# Patient Record
Sex: FEMALE | Race: WHITE | NOT HISPANIC OR LATINO | Employment: OTHER | URBAN - METROPOLITAN AREA
[De-identification: names, ages, dates, MRNs, and addresses within clinical notes are randomized per-mention and may not be internally consistent; named-entity substitution may affect disease eponyms.]

---

## 2025-04-04 ENCOUNTER — APPOINTMENT (OUTPATIENT)
Dept: RADIOLOGY | Facility: HOSPITAL | Age: OVER 89
End: 2025-04-04
Payer: MEDICARE

## 2025-04-04 ENCOUNTER — HOSPITAL ENCOUNTER (OUTPATIENT)
Facility: HOSPITAL | Age: OVER 89
Setting detail: OBSERVATION
End: 2025-04-04
Attending: STUDENT IN AN ORGANIZED HEALTH CARE EDUCATION/TRAINING PROGRAM | Admitting: INTERNAL MEDICINE
Payer: MEDICARE

## 2025-04-04 ENCOUNTER — APPOINTMENT (OUTPATIENT)
Dept: CARDIOLOGY | Facility: HOSPITAL | Age: OVER 89
End: 2025-04-04
Payer: MEDICARE

## 2025-04-04 DIAGNOSIS — K59.00 CONSTIPATION, UNSPECIFIED CONSTIPATION TYPE: ICD-10-CM

## 2025-04-04 DIAGNOSIS — I10 PRIMARY HYPERTENSION: ICD-10-CM

## 2025-04-04 DIAGNOSIS — G47.00 INSOMNIA, UNSPECIFIED TYPE: ICD-10-CM
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
## 2025-04-04 DIAGNOSIS — N39.0 URINARY TRACT INFECTION WITHOUT HEMATURIA, SITE UNSPECIFIED: ICD-10-CM

## 2025-04-04 LAB
ALBUMIN SERPL BCP-MCNC: 3.6 G/DL (ref 3.4–5)
ALP SERPL-CCNC: 66 U/L (ref 33–136)
ALT SERPL W P-5'-P-CCNC: 7 U/L (ref 7–45)
ANION GAP SERPL CALC-SCNC: 12 MMOL/L (ref 10–20)
AST SERPL W P-5'-P-CCNC: 17 U/L (ref 9–39)
BASOPHILS # BLD AUTO: 0.05 X10*3/UL (ref 0–0.1)
BASOPHILS NFR BLD AUTO: 0.8 %
BILIRUB SERPL-MCNC: 0.5 MG/DL (ref 0–1.2)
BNP SERPL-MCNC: 42 PG/ML (ref 0–99)
BUN SERPL-MCNC: 23 MG/DL (ref 6–23)
CALCIUM SERPL-MCNC: 9.5 MG/DL (ref 8.6–10.3)
CARDIAC TROPONIN I PNL SERPL HS: 6 NG/L (ref 0–13)
CARDIAC TROPONIN I PNL SERPL HS: 6 NG/L (ref 0–13)
CHLORIDE SERPL-SCNC: 109 MMOL/L (ref 98–107)
CO2 SERPL-SCNC: 20 MMOL/L (ref 21–32)
CREAT SERPL-MCNC: 1.13 MG/DL (ref 0.5–1.05)
EGFRCR SERPLBLD CKD-EPI 2021: 45 ML/MIN/1.73M*2
EOSINOPHIL # BLD AUTO: 0.08 X10*3/UL (ref 0–0.4)
EOSINOPHIL NFR BLD AUTO: 1.2 %
ERYTHROCYTE [DISTWIDTH] IN BLOOD BY AUTOMATED COUNT: 18.6 % (ref 11.5–14.5)
GLUCOSE SERPL-MCNC: 94 MG/DL (ref 74–99)
HCT VFR BLD AUTO: 32.3 % (ref 36–46)
HGB BLD-MCNC: 10.4 G/DL (ref 12–16)
IMM GRANULOCYTES # BLD AUTO: 0.02 X10*3/UL (ref 0–0.5)
IMM GRANULOCYTES NFR BLD AUTO: 0.3 % (ref 0–0.9)
LACTATE SERPL-SCNC: 0.6 MMOL/L (ref 0.4–2)
LYMPHOCYTES # BLD AUTO: 1.52 X10*3/UL (ref 0.8–3)
LYMPHOCYTES NFR BLD AUTO: 23.4 %
MCH RBC QN AUTO: 30.6 PG (ref 26–34)
MCHC RBC AUTO-ENTMCNC: 32.2 G/DL (ref 32–36)
MCV RBC AUTO: 95 FL (ref 80–100)
MONOCYTES # BLD AUTO: 0.73 X10*3/UL (ref 0.05–0.8)
MONOCYTES NFR BLD AUTO: 11.2 %
NEUTROPHILS # BLD AUTO: 4.1 X10*3/UL (ref 1.6–5.5)
NEUTROPHILS NFR BLD AUTO: 63.1 %
NRBC BLD-RTO: 0 /100 WBCS (ref 0–0)
PLATELET # BLD AUTO: 270 X10*3/UL (ref 150–450)
POTASSIUM SERPL-SCNC: 4.7 MMOL/L (ref 3.5–5.3)
PROT SERPL-MCNC: 6.3 G/DL (ref 6.4–8.2)
RBC # BLD AUTO: 3.4 X10*6/UL (ref 4–5.2)
SODIUM SERPL-SCNC: 136 MMOL/L (ref 136–145)
WBC # BLD AUTO: 6.5 X10*3/UL (ref 4.4–11.3)

## 2025-04-04 PROCEDURE — 36415 COLL VENOUS BLD VENIPUNCTURE: CPT | Performed by: EMERGENCY MEDICINE

## 2025-04-04 PROCEDURE — 72125 CT NECK SPINE W/O DYE: CPT | Performed by: RADIOLOGY

## 2025-04-04 PROCEDURE — 36415 COLL VENOUS BLD VENIPUNCTURE: CPT | Performed by: STUDENT IN AN ORGANIZED HEALTH CARE EDUCATION/TRAINING PROGRAM

## 2025-04-04 PROCEDURE — 83880 ASSAY OF NATRIURETIC PEPTIDE: CPT | Performed by: EMERGENCY MEDICINE

## 2025-04-04 PROCEDURE — 85025 COMPLETE CBC W/AUTO DIFF WBC: CPT | Performed by: STUDENT IN AN ORGANIZED HEALTH CARE EDUCATION/TRAINING PROGRAM

## 2025-04-04 PROCEDURE — 2500000001 HC RX 250 WO HCPCS SELF ADMINISTERED DRUGS (ALT 637 FOR MEDICARE OP): Performed by: EMERGENCY MEDICINE

## 2025-04-04 PROCEDURE — 73590 X-RAY EXAM OF LOWER LEG: CPT | Mod: LEFT SIDE | Performed by: RADIOLOGY

## 2025-04-04 PROCEDURE — 73030 X-RAY EXAM OF SHOULDER: CPT | Mod: LEFT SIDE | Performed by: RADIOLOGY

## 2025-04-04 PROCEDURE — 71046 X-RAY EXAM CHEST 2 VIEWS: CPT | Mod: FOREIGN READ | Performed by: RADIOLOGY

## 2025-04-04 PROCEDURE — 96361 HYDRATE IV INFUSION ADD-ON: CPT | Mod: 59

## 2025-04-04 PROCEDURE — 93005 ELECTROCARDIOGRAM TRACING: CPT

## 2025-04-04 PROCEDURE — 84484 ASSAY OF TROPONIN QUANT: CPT | Performed by: STUDENT IN AN ORGANIZED HEALTH CARE EDUCATION/TRAINING PROGRAM

## 2025-04-04 PROCEDURE — 80053 COMPREHEN METABOLIC PANEL: CPT | Performed by: STUDENT IN AN ORGANIZED HEALTH CARE EDUCATION/TRAINING PROGRAM

## 2025-04-04 PROCEDURE — 2500000004 HC RX 250 GENERAL PHARMACY W/ HCPCS (ALT 636 FOR OP/ED): Performed by: EMERGENCY MEDICINE

## 2025-04-04 PROCEDURE — 73590 X-RAY EXAM OF LOWER LEG: CPT | Mod: LT

## 2025-04-04 PROCEDURE — 71046 X-RAY EXAM CHEST 2 VIEWS: CPT

## 2025-04-04 PROCEDURE — 72170 X-RAY EXAM OF PELVIS: CPT

## 2025-04-04 PROCEDURE — 99285 EMERGENCY DEPT VISIT HI MDM: CPT | Mod: 25 | Performed by: STUDENT IN AN ORGANIZED HEALTH CARE EDUCATION/TRAINING PROGRAM

## 2025-04-04 PROCEDURE — 84484 ASSAY OF TROPONIN QUANT: CPT | Performed by: EMERGENCY MEDICINE

## 2025-04-04 PROCEDURE — 70450 CT HEAD/BRAIN W/O DYE: CPT | Performed by: RADIOLOGY

## 2025-04-04 PROCEDURE — 72125 CT NECK SPINE W/O DYE: CPT

## 2025-04-04 PROCEDURE — 73030 X-RAY EXAM OF SHOULDER: CPT | Mod: LT

## 2025-04-04 PROCEDURE — 72170 X-RAY EXAM OF PELVIS: CPT | Mod: FOREIGN READ | Performed by: RADIOLOGY

## 2025-04-04 PROCEDURE — 70450 CT HEAD/BRAIN W/O DYE: CPT

## 2025-04-04 PROCEDURE — 83605 ASSAY OF LACTIC ACID: CPT | Performed by: STUDENT IN AN ORGANIZED HEALTH CARE EDUCATION/TRAINING PROGRAM

## 2025-04-04 RX ORDER — ACETAMINOPHEN 325 MG/1
650 TABLET ORAL ONCE
Status: COMPLETED | OUTPATIENT
Start: 2025-04-04 | End: 2025-04-04

## 2025-04-04 RX ADMIN — SODIUM CHLORIDE 500 ML: 0.9 INJECTION, SOLUTION INTRAVENOUS at 23:02

## 2025-04-04 RX ADMIN — ACETAMINOPHEN 650 MG: 325 TABLET ORAL at 23:03

## 2025-04-04 ASSESSMENT — LIFESTYLE VARIABLES
EVER HAD A DRINK FIRST THING IN THE MORNING TO STEADY YOUR NERVES TO GET RID OF A HANGOVER: NO
TOTAL SCORE: 0
EVER FELT BAD OR GUILTY ABOUT YOUR DRINKING: NO
HAVE PEOPLE ANNOYED YOU BY CRITICIZING YOUR DRINKING: NO
HAVE YOU EVER FELT YOU SHOULD CUT DOWN ON YOUR DRINKING: NO

## 2025-04-04 ASSESSMENT — PAIN SCALES - GENERAL: PAINLEVEL_OUTOF10: 0 - NO PAIN

## 2025-04-04 ASSESSMENT — PAIN - FUNCTIONAL ASSESSMENT: PAIN_FUNCTIONAL_ASSESSMENT: 0-10

## 2025-04-05 PROBLEM — E78.5 HYPERLIPIDEMIA: Status: ACTIVE | Noted: 2018-06-01

## 2025-04-05 PROBLEM — R53.1 GENERALIZED WEAKNESS: Status: ACTIVE | Noted: 2025-04-05

## 2025-04-05 PROBLEM — K55.9 VASCULAR INSUFFICIENCY OF INTESTINE (MULTI): Status: ACTIVE | Noted: 2025-04-05

## 2025-04-05 PROBLEM — H90.3 SENSORINEURAL HEARING LOSS (SNHL) OF BOTH EARS: Status: ACTIVE | Noted: 2017-05-24

## 2025-04-05 PROBLEM — I25.10 ARTERIOSCLEROTIC CORONARY ARTERY DISEASE: Status: ACTIVE | Noted: 2018-02-09

## 2025-04-05 PROBLEM — K56.2 VOLVULUS OF COLON (MULTI): Status: ACTIVE | Noted: 2025-04-05

## 2025-04-05 PROBLEM — I10 BENIGN ESSENTIAL HYPERTENSION: Status: ACTIVE | Noted: 2018-02-09

## 2025-04-05 LAB
APPEARANCE UR: CLEAR
BILIRUB UR STRIP.AUTO-MCNC: NEGATIVE MG/DL
COLOR UR: ABNORMAL
GLUCOSE UR STRIP.AUTO-MCNC: NORMAL MG/DL
HOLD SPECIMEN: NORMAL
KETONES UR STRIP.AUTO-MCNC: NEGATIVE MG/DL
LEUKOCYTE ESTERASE UR QL STRIP.AUTO: ABNORMAL
NITRITE UR QL STRIP.AUTO: NEGATIVE
PH UR STRIP.AUTO: 6 [PH]
PROT UR STRIP.AUTO-MCNC: NEGATIVE MG/DL
RBC # UR STRIP.AUTO: NEGATIVE MG/DL
RBC #/AREA URNS AUTO: ABNORMAL /HPF
SP GR UR STRIP.AUTO: 1.02
SQUAMOUS #/AREA URNS AUTO: ABNORMAL /HPF
UROBILINOGEN UR STRIP.AUTO-MCNC: NORMAL MG/DL
WBC #/AREA URNS AUTO: ABNORMAL /HPF

## 2025-04-05 PROCEDURE — 81001 URINALYSIS AUTO W/SCOPE: CPT | Performed by: STUDENT IN AN ORGANIZED HEALTH CARE EDUCATION/TRAINING PROGRAM

## 2025-04-05 PROCEDURE — G0378 HOSPITAL OBSERVATION PER HR: HCPCS

## 2025-04-05 PROCEDURE — 87086 URINE CULTURE/COLONY COUNT: CPT | Mod: PORLAB | Performed by: STUDENT IN AN ORGANIZED HEALTH CARE EDUCATION/TRAINING PROGRAM

## 2025-04-05 PROCEDURE — 2500000001 HC RX 250 WO HCPCS SELF ADMINISTERED DRUGS (ALT 637 FOR MEDICARE OP): Performed by: EMERGENCY MEDICINE

## 2025-04-05 PROCEDURE — 97162 PT EVAL MOD COMPLEX 30 MIN: CPT | Mod: GP

## 2025-04-05 PROCEDURE — 2500000001 HC RX 250 WO HCPCS SELF ADMINISTERED DRUGS (ALT 637 FOR MEDICARE OP): Performed by: STUDENT IN AN ORGANIZED HEALTH CARE EDUCATION/TRAINING PROGRAM

## 2025-04-05 PROCEDURE — 97166 OT EVAL MOD COMPLEX 45 MIN: CPT | Mod: GO

## 2025-04-05 PROCEDURE — 99223 1ST HOSP IP/OBS HIGH 75: CPT

## 2025-04-05 PROCEDURE — 2500000001 HC RX 250 WO HCPCS SELF ADMINISTERED DRUGS (ALT 637 FOR MEDICARE OP)

## 2025-04-05 RX ORDER — PANTOPRAZOLE SODIUM 40 MG/1
40 TABLET, DELAYED RELEASE ORAL
Status: ACTIVE | OUTPATIENT
Start: 2025-04-06

## 2025-04-05 RX ORDER — SIMVASTATIN 20 MG/1
20 TABLET, FILM COATED ORAL NIGHTLY
Status: ON HOLD | COMMUNITY

## 2025-04-05 RX ORDER — CARVEDILOL 3.12 MG/1
3.12 TABLET ORAL 2 TIMES DAILY
Status: DISPENSED | OUTPATIENT
Start: 2025-04-05

## 2025-04-05 RX ORDER — ENOXAPARIN SODIUM 100 MG/ML
30 INJECTION SUBCUTANEOUS EVERY 24 HOURS
Status: DISPENSED | OUTPATIENT
Start: 2025-04-05

## 2025-04-05 RX ORDER — CEPHALEXIN 500 MG/1
500 CAPSULE ORAL EVERY 12 HOURS SCHEDULED
Status: DISCONTINUED | OUTPATIENT
Start: 2025-04-05 | End: 2025-04-06

## 2025-04-05 RX ORDER — NAPROXEN SODIUM 220 MG/1
81 TABLET, FILM COATED ORAL ONCE
Status: COMPLETED | OUTPATIENT
Start: 2025-04-05 | End: 2025-04-05

## 2025-04-05 RX ORDER — POLYETHYLENE GLYCOL 3350 17 G/17G
17 POWDER, FOR SOLUTION ORAL DAILY PRN
Status: ACTIVE | OUTPATIENT
Start: 2025-04-05

## 2025-04-05 RX ORDER — TALC
3 POWDER (GRAM) TOPICAL NIGHTLY PRN
Status: ACTIVE | OUTPATIENT
Start: 2025-04-05

## 2025-04-05 RX ORDER — CARVEDILOL 3.12 MG/1
3.12 TABLET ORAL 2 TIMES DAILY
Status: ON HOLD | COMMUNITY

## 2025-04-05 RX ORDER — GUAIFENESIN 600 MG/1
600 TABLET, EXTENDED RELEASE ORAL EVERY 12 HOURS PRN
Status: ACTIVE | OUTPATIENT
Start: 2025-04-05

## 2025-04-05 RX ORDER — LISINOPRIL 10 MG/1
10 TABLET ORAL DAILY
Status: DISPENSED | OUTPATIENT
Start: 2025-04-06

## 2025-04-05 RX ORDER — ASPIRIN 81 MG/1
81 TABLET ORAL DAILY
Status: DISPENSED | OUTPATIENT
Start: 2025-04-05

## 2025-04-05 RX ORDER — LISINOPRIL 10 MG/1
10 TABLET ORAL DAILY
Status: ON HOLD | COMMUNITY

## 2025-04-05 RX ORDER — LISINOPRIL 10 MG/1
10 TABLET ORAL DAILY
Status: DISCONTINUED | OUTPATIENT
Start: 2025-04-05 | End: 2025-04-05

## 2025-04-05 RX ORDER — ONDANSETRON HYDROCHLORIDE 2 MG/ML
4 INJECTION, SOLUTION INTRAVENOUS EVERY 6 HOURS PRN
Status: ACTIVE | OUTPATIENT
Start: 2025-04-05

## 2025-04-05 RX ORDER — ASPIRIN 81 MG/1
81 TABLET ORAL
Status: ON HOLD | COMMUNITY

## 2025-04-05 RX ORDER — SIMVASTATIN 20 MG/1
20 TABLET, FILM COATED ORAL NIGHTLY
Status: DISCONTINUED | OUTPATIENT
Start: 2025-04-05 | End: 2025-04-05

## 2025-04-05 RX ORDER — ATORVASTATIN CALCIUM 20 MG/1
20 TABLET, FILM COATED ORAL NIGHTLY
Status: DISPENSED | OUTPATIENT
Start: 2025-04-05

## 2025-04-05 RX ORDER — ACETAMINOPHEN 325 MG/1
650 TABLET ORAL EVERY 4 HOURS PRN
Status: DISPENSED | OUTPATIENT
Start: 2025-04-05

## 2025-04-05 RX ORDER — PANTOPRAZOLE SODIUM 40 MG/10ML
40 INJECTION, POWDER, LYOPHILIZED, FOR SOLUTION INTRAVENOUS
Status: ACTIVE | OUTPATIENT
Start: 2025-04-06

## 2025-04-05 RX ADMIN — ASPIRIN 81 MG: 81 TABLET, COATED ORAL at 18:22

## 2025-04-05 RX ADMIN — CARVEDILOL 3.12 MG: 3.12 TABLET, FILM COATED ORAL at 20:49

## 2025-04-05 RX ADMIN — CEPHALEXIN 500 MG: 500 CAPSULE ORAL at 20:49

## 2025-04-05 RX ADMIN — ATORVASTATIN CALCIUM 20 MG: 20 TABLET, FILM COATED ORAL at 20:49

## 2025-04-05 RX ADMIN — ASPIRIN 81 MG CHEWABLE TABLET 81 MG: 81 TABLET CHEWABLE at 08:31

## 2025-04-05 RX ADMIN — LISINOPRIL 10 MG: 10 TABLET ORAL at 08:32

## 2025-04-05 SDOH — SOCIAL STABILITY: SOCIAL INSECURITY: WITHIN THE LAST YEAR, HAVE YOU BEEN AFRAID OF YOUR PARTNER OR EX-PARTNER?: NO

## 2025-04-05 SDOH — HEALTH STABILITY: PHYSICAL HEALTH
HOW OFTEN DO YOU NEED TO HAVE SOMEONE HELP YOU WHEN YOU READ INSTRUCTIONS, PAMPHLETS, OR OTHER WRITTEN MATERIAL FROM YOUR DOCTOR OR PHARMACY?: ALWAYS

## 2025-04-05 SDOH — SOCIAL STABILITY: SOCIAL INSECURITY
WITHIN THE LAST YEAR, HAVE YOU BEEN RAPED OR FORCED TO HAVE ANY KIND OF SEXUAL ACTIVITY BY YOUR PARTNER OR EX-PARTNER?: NO

## 2025-04-05 SDOH — ECONOMIC STABILITY: FOOD INSECURITY: WITHIN THE PAST 12 MONTHS, YOU WORRIED THAT YOUR FOOD WOULD RUN OUT BEFORE YOU GOT THE MONEY TO BUY MORE.: NEVER TRUE

## 2025-04-05 SDOH — ECONOMIC STABILITY: INCOME INSECURITY: IN THE PAST 12 MONTHS HAS THE ELECTRIC, GAS, OIL, OR WATER COMPANY THREATENED TO SHUT OFF SERVICES IN YOUR HOME?: NO

## 2025-04-05 SDOH — HEALTH STABILITY: MENTAL HEALTH
DO YOU FEEL STRESS - TENSE, RESTLESS, NERVOUS, OR ANXIOUS, OR UNABLE TO SLEEP AT NIGHT BECAUSE YOUR MIND IS TROUBLED ALL THE TIME - THESE DAYS?: NOT AT ALL

## 2025-04-05 SDOH — ECONOMIC STABILITY: HOUSING INSECURITY
IN THE LAST 12 MONTHS, WAS THERE A TIME WHEN YOU WERE NOT ABLE TO PAY THE MORTGAGE OR RENT ON TIME?: PATIENT UNABLE TO ANSWER

## 2025-04-05 SDOH — SOCIAL STABILITY: SOCIAL NETWORK: HOW OFTEN DO YOU GET TOGETHER WITH FRIENDS OR RELATIVES?: MORE THAN THREE TIMES A WEEK

## 2025-04-05 SDOH — ECONOMIC STABILITY: HOUSING INSECURITY: AT ANY TIME IN THE PAST 12 MONTHS, WERE YOU HOMELESS OR LIVING IN A SHELTER (INCLUDING NOW)?: PATIENT UNABLE TO ANSWER

## 2025-04-05 SDOH — SOCIAL STABILITY: SOCIAL INSECURITY: DO YOU FEEL UNSAFE GOING BACK TO THE PLACE WHERE YOU ARE LIVING?: NO

## 2025-04-05 SDOH — SOCIAL STABILITY: SOCIAL INSECURITY
WITHIN THE LAST YEAR, HAVE YOU BEEN KICKED, HIT, SLAPPED, OR OTHERWISE PHYSICALLY HURT BY YOUR PARTNER OR EX-PARTNER?: NO

## 2025-04-05 SDOH — ECONOMIC STABILITY: FOOD INSECURITY: WITHIN THE PAST 12 MONTHS, THE FOOD YOU BOUGHT JUST DIDN'T LAST AND YOU DIDN'T HAVE MONEY TO GET MORE.: NEVER TRUE

## 2025-04-05 SDOH — SOCIAL STABILITY: SOCIAL INSECURITY: HAS ANYONE EVER THREATENED TO HURT YOUR FAMILY OR YOUR PETS?: NO

## 2025-04-05 SDOH — HEALTH STABILITY: PHYSICAL HEALTH: ON AVERAGE, HOW MANY MINUTES DO YOU ENGAGE IN EXERCISE AT THIS LEVEL?: 0 MIN

## 2025-04-05 SDOH — ECONOMIC STABILITY: HOUSING INSECURITY: IN THE PAST 12 MONTHS, HOW MANY TIMES HAVE YOU MOVED WHERE YOU WERE LIVING?: 0

## 2025-04-05 SDOH — ECONOMIC STABILITY: TRANSPORTATION INSECURITY
IN THE PAST 12 MONTHS, HAS LACK OF TRANSPORTATION KEPT YOU FROM MEDICAL APPOINTMENTS OR FROM GETTING MEDICATIONS?: PATIENT UNABLE TO ANSWER

## 2025-04-05 SDOH — SOCIAL STABILITY: SOCIAL NETWORK
IN A TYPICAL WEEK, HOW MANY TIMES DO YOU TALK ON THE PHONE WITH FAMILY, FRIENDS, OR NEIGHBORS?: MORE THAN THREE TIMES A WEEK

## 2025-04-05 SDOH — SOCIAL STABILITY: SOCIAL INSECURITY: ARE THERE ANY APPARENT SIGNS OF INJURIES/BEHAVIORS THAT COULD BE RELATED TO ABUSE/NEGLECT?: NO

## 2025-04-05 SDOH — SOCIAL STABILITY: SOCIAL INSECURITY: HAVE YOU HAD ANY THOUGHTS OF HARMING ANYONE ELSE?: NO

## 2025-04-05 SDOH — SOCIAL STABILITY: SOCIAL NETWORK: HOW OFTEN DO YOU ATTEND MEETINGS OF THE CLUBS OR ORGANIZATIONS YOU BELONG TO?: NEVER

## 2025-04-05 SDOH — HEALTH STABILITY: PHYSICAL HEALTH: ON AVERAGE, HOW MANY DAYS PER WEEK DO YOU ENGAGE IN MODERATE TO STRENUOUS EXERCISE (LIKE A BRISK WALK)?: 0 DAYS

## 2025-04-05 SDOH — ECONOMIC STABILITY: FOOD INSECURITY
HOW HARD IS IT FOR YOU TO PAY FOR THE VERY BASICS LIKE FOOD, HOUSING, MEDICAL CARE, AND HEATING?: PATIENT UNABLE TO ANSWER

## 2025-04-05 SDOH — SOCIAL STABILITY: SOCIAL INSECURITY: DO YOU FEEL ANYONE HAS EXPLOITED OR TAKEN ADVANTAGE OF YOU FINANCIALLY OR OF YOUR PERSONAL PROPERTY?: NO

## 2025-04-05 SDOH — SOCIAL STABILITY: SOCIAL INSECURITY: WITHIN THE LAST YEAR, HAVE YOU BEEN HUMILIATED OR EMOTIONALLY ABUSED IN OTHER WAYS BY YOUR PARTNER OR EX-PARTNER?: NO

## 2025-04-05 SDOH — SOCIAL STABILITY: SOCIAL NETWORK: HOW OFTEN DO YOU ATTEND CHURCH OR RELIGIOUS SERVICES?: NEVER

## 2025-04-05 SDOH — SOCIAL STABILITY: SOCIAL INSECURITY: DOES ANYONE TRY TO KEEP YOU FROM HAVING/CONTACTING OTHER FRIENDS OR DOING THINGS OUTSIDE YOUR HOME?: NO

## 2025-04-05 SDOH — SOCIAL STABILITY: SOCIAL NETWORK
DO YOU BELONG TO ANY CLUBS OR ORGANIZATIONS SUCH AS CHURCH GROUPS, UNIONS, FRATERNAL OR ATHLETIC GROUPS, OR SCHOOL GROUPS?: NO

## 2025-04-05 SDOH — SOCIAL STABILITY: SOCIAL INSECURITY: ARE YOU OR HAVE YOU BEEN THREATENED OR ABUSED PHYSICALLY, EMOTIONALLY, OR SEXUALLY BY ANYONE?: NO

## 2025-04-05 SDOH — SOCIAL STABILITY: SOCIAL INSECURITY: ABUSE: ADULT

## 2025-04-05 SDOH — SOCIAL STABILITY: SOCIAL INSECURITY: WERE YOU ABLE TO COMPLETE ALL THE BEHAVIORAL HEALTH SCREENINGS?: YES

## 2025-04-05 SDOH — SOCIAL STABILITY: SOCIAL INSECURITY: HAVE YOU HAD THOUGHTS OF HARMING ANYONE ELSE?: NO

## 2025-04-05 ASSESSMENT — ACTIVITIES OF DAILY LIVING (ADL)
TOILETING: NEEDS ASSISTANCE
BATHING: NEEDS ASSISTANCE
JUDGMENT_ADEQUATE_SAFELY_COMPLETE_DAILY_ACTIVITIES: NO
PATIENT'S MEMORY ADEQUATE TO SAFELY COMPLETE DAILY ACTIVITIES?: NO
LACK_OF_TRANSPORTATION: PATIENT UNABLE TO ANSWER
GROOMING: NEEDS ASSISTANCE
ADEQUATE_TO_COMPLETE_ADL: YES
GROOMING: NEEDS ASSISTANCE
WALKS IN HOME: NEEDS ASSISTANCE
WALKS IN HOME: NEEDS ASSISTANCE
DRESSING YOURSELF: NEEDS ASSISTANCE
FEEDING YOURSELF: INDEPENDENT
JUDGMENT_ADEQUATE_SAFELY_COMPLETE_DAILY_ACTIVITIES: NO
FEEDING YOURSELF: NEEDS ASSISTANCE
DRESSING YOURSELF: NEEDS ASSISTANCE
PATIENT'S MEMORY ADEQUATE TO SAFELY COMPLETE DAILY ACTIVITIES?: NO
ADEQUATE_TO_COMPLETE_ADL: YES
HEARING - RIGHT EAR: HEARING AID
LACK_OF_TRANSPORTATION: PATIENT UNABLE TO ANSWER
BATHING: NEEDS ASSISTANCE
HEARING - LEFT EAR: HEARING AID
LACK_OF_TRANSPORTATION: PATIENT UNABLE TO ANSWER
TOILETING: NEEDS ASSISTANCE
LACK_OF_TRANSPORTATION: PATIENT UNABLE TO ANSWER
ASSISTIVE_DEVICE: WALKER

## 2025-04-05 ASSESSMENT — COGNITIVE AND FUNCTIONAL STATUS - GENERAL
STANDING UP FROM CHAIR USING ARMS: A LITTLE
DRESSING REGULAR LOWER BODY CLOTHING: A LITTLE
DAILY ACTIVITIY SCORE: 18
DRESSING REGULAR LOWER BODY CLOTHING: A LITTLE
WALKING IN HOSPITAL ROOM: A LOT
PERSONAL GROOMING: A LITTLE
MOVING TO AND FROM BED TO CHAIR: A LITTLE
TOILETING: A LITTLE
WALKING IN HOSPITAL ROOM: A LOT
EATING MEALS: A LITTLE
STANDING UP FROM CHAIR USING ARMS: A LITTLE
HELP NEEDED FOR BATHING: A LITTLE
DAILY ACTIVITIY SCORE: 18
MOVING TO AND FROM BED TO CHAIR: A LITTLE
TURNING FROM BACK TO SIDE WHILE IN FLAT BAD: A LITTLE
HELP NEEDED FOR BATHING: A LITTLE
EATING MEALS: A LITTLE
DRESSING REGULAR UPPER BODY CLOTHING: A LITTLE
DRESSING REGULAR UPPER BODY CLOTHING: A LITTLE
WALKING IN HOSPITAL ROOM: A LOT
DRESSING REGULAR LOWER BODY CLOTHING: A LITTLE
MOVING TO AND FROM BED TO CHAIR: A LITTLE
PERSONAL GROOMING: A LITTLE
DRESSING REGULAR LOWER BODY CLOTHING: A LOT
DAILY ACTIVITIY SCORE: 18
CLIMB 3 TO 5 STEPS WITH RAILING: TOTAL
CLIMB 3 TO 5 STEPS WITH RAILING: A LOT
MOBILITY SCORE: 18
CLIMB 3 TO 5 STEPS WITH RAILING: TOTAL
MOBILITY SCORE: 17
CLIMB 3 TO 5 STEPS WITH RAILING: A LOT
HELP NEEDED FOR BATHING: A LITTLE
MOVING FROM LYING ON BACK TO SITTING ON SIDE OF FLAT BED WITH BEDRAILS: A LITTLE
MOVING TO AND FROM BED TO CHAIR: A LITTLE
STANDING UP FROM CHAIR USING ARMS: A LITTLE
PERSONAL GROOMING: A LITTLE
DRESSING REGULAR UPPER BODY CLOTHING: A LITTLE
TOILETING: A LITTLE
HELP NEEDED FOR BATHING: A LOT
MOBILITY SCORE: 17
TOILETING: A LOT
TOILETING: A LITTLE
PERSONAL GROOMING: A LITTLE
EATING MEALS: A LITTLE
WALKING IN HOSPITAL ROOM: A LOT
PATIENT BASELINE BEDBOUND: NO
MOBILITY SCORE: 16
DAILY ACTIVITIY SCORE: 15
EATING MEALS: A LITTLE
STANDING UP FROM CHAIR USING ARMS: A LITTLE
DRESSING REGULAR UPPER BODY CLOTHING: A LITTLE

## 2025-04-05 ASSESSMENT — LIFESTYLE VARIABLES
PRESCIPTION_ABUSE_PAST_12_MONTHS: NO
AUDIT-C TOTAL SCORE: 0
SKIP TO QUESTIONS 9-10: 1
SUBSTANCE_ABUSE_PAST_12_MONTHS: NO
AUDIT-C TOTAL SCORE: 0
HOW OFTEN DO YOU HAVE 6 OR MORE DRINKS ON ONE OCCASION: NEVER
HOW OFTEN DO YOU HAVE A DRINK CONTAINING ALCOHOL: NEVER
HOW MANY STANDARD DRINKS CONTAINING ALCOHOL DO YOU HAVE ON A TYPICAL DAY: PATIENT DOES NOT DRINK

## 2025-04-05 ASSESSMENT — ENCOUNTER SYMPTOMS
CONSTIPATION: 0
CHILLS: 0
CHEST TIGHTNESS: 0
TREMORS: 0
DIARRHEA: 0
ACTIVITY CHANGE: 1
WEAKNESS: 0
JOINT SWELLING: 0
WHEEZING: 0
FLANK PAIN: 0
FATIGUE: 0
NECK PAIN: 0
HEADACHES: 0
ABDOMINAL PAIN: 0
NAUSEA: 0
BACK PAIN: 0
FEVER: 0
PALPITATIONS: 0
VOMITING: 0
SHORTNESS OF BREATH: 0
HEMATURIA: 0
WOUND: 0
COUGH: 0

## 2025-04-05 ASSESSMENT — PAIN - FUNCTIONAL ASSESSMENT
PAIN_FUNCTIONAL_ASSESSMENT: 0-10

## 2025-04-05 ASSESSMENT — PATIENT HEALTH QUESTIONNAIRE - PHQ9
SUM OF ALL RESPONSES TO PHQ9 QUESTIONS 1 & 2: 0
1. LITTLE INTEREST OR PLEASURE IN DOING THINGS: NOT AT ALL
2. FEELING DOWN, DEPRESSED OR HOPELESS: NOT AT ALL

## 2025-04-05 ASSESSMENT — PAIN SCALES - GENERAL
PAINLEVEL_OUTOF10: 0 - NO PAIN

## 2025-04-05 NOTE — PROGRESS NOTES
Physical Therapy    Physical Therapy Evaluation    Patient Name: Sylvia Moody  MRN: [id number]  Department: [facility] ED  Room: [place] [address]  Today's Date: 4/5/2025        Assessment/Plan   PT Assessment  PT Assessment Results: Decreased strength, Decreased endurance, Impaired balance, Decreased mobility, Decreased cognition, Decreased safety awareness, Impaired hearing  Rehab Prognosis: Good  Evaluation/Treatment Tolerance: Patient tolerated treatment well  End of Session Communication: Bedside nurse  Assessment Comment: Pt has decreased strength, balance and endurance as well as impaired safety awareness and cognition. She would benefit from skilled PT services to address deficits and improve safety with functional mobility including transfers and ambulation.  End of Session Patient Position:  (ED COT with 2 rails up)  IP OR SWING BED PT PLAN  Inpatient or Swing Bed: Inpatient  PT Plan  Treatment/Interventions: Transfer training, Gait training, Stair training, Balance training, Neuromuscular re-education, Strengthening, Endurance training, Therapeutic exercise, Therapeutic activity, Postural re-education, Positioning, Bed mobility  PT Plan: Ongoing PT  PT Frequency: 3 times per week  PT Discharge Recommendations: Moderate intensity level of continued care    Subjective   General Visit Information:  General  Reason for Referral: Impaired Mobility  Referred By: Pipo  Past Medical History Relevant to Rehab: CAD s/p CABG, dyslipidemia, recent fall  Co-Treatment: OT  Co-Treatment Reason: Co-eval and treat completed in order to optimize patient safety and function in order to promote OT goal achievement  Prior to Session Communication: Bedside nurse  Patient Position Received:  (ED cot with 2 rails up)  General Comment: ED 15: pt is pleasant and agreeable to therapy  Home Living:  Home Living  Home Living Comments: Pt is poor historian, uncertain of accuracy: Pt lives with daughter in 2 story house with 4 MARY and HR.  Bedroom/bathroom are upstairs with tub/shower and walk in shower within home. She uses FWW for ambulation.  Prior Level of Function:  Prior Function Per Pt/Caregiver Report  Prior Function Comments: Pt reports independence with functional mobility and ADLs with FWW. Pt has had falls at home per chart. Chart indicates Pt needs A at home and dtr is unable to provide  Precautions:  Precautions  Hearing/Visual Limitations: [place]: no hearing aids in ED  Medical Precautions: Fall precautions             Objective   Pain:  Pain Assessment  Pain Assessment: 0-10  0-10 (Numeric) Pain Score: 0 - No pain  Cognition:  Cognition  Overall Cognitive Status: Impaired  Orientation Level: Disoriented to place, Disoriented to time, Disoriented to situation (person only)    General Assessments:       Sensation  Light Touch: No apparent deficits            Perception  Inattention/Neglect: Appears intact      Coordination  Movements are Fluid and Coordinated: Yes         Static Sitting Balance  Static Sitting-Balance Support: Feet unsupported, Bilateral upper extremity supported  Static Sitting-Level of Assistance: Close supervision  Static Sitting-Comment/Number of Minutes: 2 min (EOB)       Functional Assessments:  Bed Mobility  Bed Mobility: Yes  Bed Mobility 1  Bed Mobility 1: Supine to sitting, Sitting to supine  Level of Assistance 1: Close supervision    Transfers  Transfer: Yes  Transfer 1  Technique 1: Sit to stand, Stand to sit  Transfer Device 1: Walker  Transfer Level of Assistance 1: Minimum assistance, Maximum verbal cues  Trials/Comments 1: x1 from ED Cot; x2 trials from chair with handles (Pt demonstrates unsafe technique with use of FWW, unable to correct with MAX cueing; however pt is also [place] and no hearing aids in ED at time of evaluation)    Ambulation/Gait Training  Ambulation/Gait Training Performed: Yes  Ambulation/Gait Training 1  Surface 1: Level tile  Device 1: Rolling walker  Assistance 1: Minimum assistance,  Maximum verbal cues  Quality of Gait 1: Forward flexed posture  Comments/Distance (ft) 1: 20 ft x2 trials (Pt too far away from walker, unable to correct with Max cueing and PT HHA on walker for safety)    Stairs  Stairs: No  Extremity/Trunk Assessments:  RLE   RLE : Within Functional Limits  LLE   LLE : Within Functional Limits  Outcome Measures:  [facility] Basic Mobility  Turning from your back to your side while in a flat bed without using bedrails: A little  Moving from lying on your back to sitting on the side of a flat bed without using bedrails: A little  Moving to and from bed to chair (including a wheelchair): A little  Standing up from a chair using your arms (e.g. wheelchair or bedside chair): A little  To walk in hospital room: A lot  Climbing 3-5 steps with railing: A lot  Basic Mobility - Total Score: 16    Timed Up and Go Test  How many seconds did it take to complete the 5 tasks?: 43 seconds  Observe the patient’s postural stability, gait, stride length, and sway. Select All that Apply:: Slow tentative pace, Not using assistive device properly    Encounter Problems       Encounter Problems (Active)       Balance       STG - Maintains static standing balance without upper extremity support and Supervision (Progressing)       Start:  04/05/25    Expected End:  04/19/25       INTERVENTIONS:1. Practice standing with minimal support.2. Educate patient about maintaining total hip precautions while maintaining balance.3. Educate patient about standing tolerance.4. Educate patient about independence with gait, transfers, and ADL's.5. Educate patient about use of assistive device.6. Educate patient about self-directed care.            Mobility       STG - Patient will ambulate 50ft with FWW safely with Supervision (Progressing)       Start:  04/05/25    Expected End:  04/19/25               PT Transfers       STG - Patient will transfer sit to and from stand using fWW with safe technique and Supervision  (Progressing)       Start:  04/05/25    Expected End:  04/19/25                   Education Documentation  Mobility Training, taught by Demi Cazares, PT at 4/5/2025 12:58 PM.  Learner: Patient  Readiness: Acceptance  Method: Explanation  Response: No Evidence of Learning    Education Comments  No comments found.

## 2025-04-05 NOTE — PROGRESS NOTES
I have accept care of this patient in signout.    In summary:  I received patient in signout in summary this is a 95-year-old female possible history of coronary disease, CABG, hypertension, dyslipidemia who presented to the emergency department for unwitnessed fall.  Patient's workup does show a UTI her images were all negative.  Daughter states she is no longer able to take care of patient at home given her weakness therefore will admit patient at this time.      Labs Reviewed   CBC WITH AUTO DIFFERENTIAL - Abnormal       Result Value    WBC 6.5      nRBC 0.0      RBC 3.40 (*)     Hemoglobin 10.4 (*)     Hematocrit 32.3 (*)     MCV 95      MCH 30.6      MCHC 32.2      RDW 18.6 (*)     Platelets 270      Neutrophils % 63.1      Immature Granulocytes %, Automated 0.3      Lymphocytes % 23.4      Monocytes % 11.2      Eosinophils % 1.2      Basophils % 0.8      Neutrophils Absolute 4.10      Immature Granulocytes Absolute, Automated 0.02      Lymphocytes Absolute 1.52      Monocytes Absolute 0.73      Eosinophils Absolute 0.08      Basophils Absolute 0.05     COMPREHENSIVE METABOLIC PANEL - Abnormal    Glucose 94      Sodium 136      Potassium 4.7      Chloride 109 (*)     Bicarbonate 20 (*)     Anion Gap 12      Urea Nitrogen 23      Creatinine 1.13 (*)     eGFR 45 (*)     Calcium 9.5      Albumin 3.6      Alkaline Phosphatase 66      Total Protein 6.3 (*)     AST 17      Bilirubin, Total 0.5      ALT 7     URINALYSIS WITH REFLEX CULTURE AND MICROSCOPIC - Abnormal    Color, Urine Light-Yellow      Appearance, Urine Clear      Specific Gravity, Urine 1.018      pH, Urine 6.0      Protein, Urine NEGATIVE      Glucose, Urine Normal      Blood, Urine NEGATIVE      Ketones, Urine NEGATIVE      Bilirubin, Urine NEGATIVE      Urobilinogen, Urine Normal      Nitrite, Urine NEGATIVE      Leukocyte Esterase, Urine 500 Nancy/uL (*)    MICROSCOPIC ONLY, URINE - Abnormal    WBC, Urine 21-50 (*)     RBC, Urine 1-2      Squamous  Epithelial Cells, Urine 1-9 (SPARSE)     LACTATE - Normal    Lactate 0.6      Narrative:     Venipuncture immediately after or during the administration of Metamizole may lead to falsely low results. Testing should be performed immediately prior to Metamizole dosing.   TROPONIN I, HIGH SENSITIVITY - Normal    Troponin I, High Sensitivity 6      Narrative:     Less than 99th percentile of normal range cutoff-  Female and children under 18 years old <14 ng/L; Male <21 ng/L: Negative  Repeat testing should be performed if clinically indicated.     Female and children under 18 years old 14-50 ng/L; Male 21-50 ng/L:  Consistent with possible cardiac damage and possible increased clinical   risk. Serial measurements may help to assess extent of myocardial damage.     >50 ng/L: Consistent with cardiac damage, increased clinical risk and  myocardial infarction. Serial measurements may help assess extent of   myocardial damage.      NOTE: Children less than 1 year old may have higher baseline troponin   levels and results should be interpreted in conjunction with the overall   clinical context.     NOTE: Troponin I testing is performed using a different   testing methodology at [facility] than at other   [facility]. Direct result comparisons should only   be made within the same method.   TROPONIN I, HIGH SENSITIVITY - Normal    Troponin I, High Sensitivity 6      Narrative:     Less than 99th percentile of normal range cutoff-  Female and children under 18 years old <14 ng/L; Male <21 ng/L: Negative  Repeat testing should be performed if clinically indicated.     Female and children under 18 years old 14-50 ng/L; Male 21-50 ng/L:  Consistent with possible cardiac damage and possible increased clinical   risk. Serial measurements may help to assess extent of myocardial damage.     >50 ng/L: Consistent with cardiac damage, increased clinical risk and  myocardial infarction. Serial measurements may help  assess extent of   myocardial damage.      NOTE: Children less than 1 year old may have higher baseline troponin   levels and results should be interpreted in conjunction with the overall   clinical context.     NOTE: Troponin I testing is performed using a different   testing methodology at [facility] than at other   [facility]. Direct result comparisons should only   be made within the same method.   B-TYPE NATRIURETIC PEPTIDE - Normal    BNP 42      Narrative:        <100 pg/mL - Heart failure unlikely  100-299 pg/mL - Intermediate probability of acute heart                  failure exacerbation. Correlate with clinical                  context and patient history.    >=300 pg/mL - Heart Failure likely. Correlate with clinical                  context and patient history.    BNP testing is performed using different testing methodology at [facility] than at other [facility]. Direct result comparisons should only be made within the same method.      URINE CULTURE   URINALYSIS WITH REFLEX CULTURE AND MICROSCOPIC    Narrative:     The following orders were created for panel order Urinalysis with Reflex Culture and Microscopic.  Procedure                               Abnormality         Status                     ---------                               -----------         ------                     Urinalysis with Reflex Cu...[[id number]]  Abnormal            Final result               Extra Urine Gray Tube[[id number]]                             Final result                 Please view results for these tests on the individual orders.   EXTRA URINE GRAY TUBE    Extra Tube Hold for add-ons.       XR shoulder left 2+ views   Final Result   Advanced degenerative changes and osteopenia with calcific   tendinopathy supraspinatus tendon.   Signed by Tani Abraham,       CT cervical spine wo IV contrast   Final Result        No acute intracranial hemorrhage, mass effect or midline  shift.        Encephalomalacia in the right hemisphere similar compared to prior   imaging is compatible with remote infarct. Additional nonspecific   scattered white matter hypodensities favored to represent sequela of   small vessel ischemia. Cervical spondylosis without acute loss of   vertebral body height or traumatic malalignment.             MACRO:   None.        Signed by: Evan Finkelstein 4/4/2025 10:33 PM   Dictation workstation:   [id number]      CT head wo IV contrast   Final Result        No acute intracranial hemorrhage, mass effect or midline shift.        Encephalomalacia in the right hemisphere similar compared to prior   imaging is compatible with remote infarct. Additional nonspecific   scattered white matter hypodensities favored to represent sequela of   small vessel ischemia. Cervical spondylosis without acute loss of   vertebral body height or traumatic malalignment.             MACRO:   None.        Signed by: Evan Finkelstein 4/4/2025 10:33 PM   Dictation workstation:   [id number]      XR chest 2 views   Final Result   No acute abnormality.   Signed by Rush Martin,       XR tibia fibula left 2 views   Final Result   No acute osseous abnormality.   Degenerative changes.   Signed by Davin Doyle MD      XR pelvis 1-2 views   Final Result   No acute osseous abnormality.   Bilateral degenerative changes of the hips..   Signed by Davin Doyle MD

## 2025-04-05 NOTE — PROGRESS NOTES
Problem: Goal Outcome Summary  Goal: Goal Outcome Summary  Outcome: Improving  A/O, VSS, up independently, 2+ doppler pulses, 3+ edema right foot, right heel ulcer with dry skin and erythema around the site, abd steri strips intact WDL, On Q present but empty (MDs aware and to address today). Abd binder in place, BS, flatus, BM tonight soft. Please hold Senna. Pt progressing well.       Pt is unable to make decisions due to mentation. Sent pt's daughter Anat a [facility] list of skilled SNF agencies that are in network with patient's insurance payor, service patient's preferred geographic region, and that displays CMS star ratings. Emailed to [email or web address] and also texted link to [phone]. Anat to respond with facility choices. SW to follow up on choices.

## 2025-04-05 NOTE — PROGRESS NOTES
Occupational Therapy  Evaluation    Patient Name: Sylvia Moody  MRN: [id number]  Today's Date: 4/5/2025  Time Calculation  Start Time: 1106  Stop Time: 1121  Time Calculation (min): 15 min    Current Problem:   1. Fall, initial encounter        OT order: OT eval and treat   Referred by: Abad Foss MD  Reason for referral: ADLs and Safety Assess  Past medical history related to rehab:   Past Medical History:   Diagnosis Date    Laceration without foreign body of unspecified hand, initial encounter 05/30/2017    Hand laceration    Personal history of other diseases of the digestive system 06/25/2018    History of gastroesophageal reflux (GERD)    Vascular disorder of intestine, unspecified (Multi)     Mesenteric ischemia    Volvulus (Multi)     Colonic volvulus      Past Surgical History:   Procedure Laterality Date    OTHER SURGICAL HISTORY  06/25/2018    Aortocoronary Bypass Graft Four Or More Coronary Arteries         Pt admitted 4/4 s/p fall at home    Orders received, chart reviewed, eval complete  0015 CT cervical spine wo IV contrast  Cervical spondylosis without acute loss of  vertebral body height or traumatic malalignment.   [JH]   0015 XR pelvis 1-2 views  Pelvis x-ray is read as negative [JH]   0015 XR tibia fibula left 2 views  Tibia x-ray is read as negative [JH]     0013 XR shoulder left 2+ views  Shoulder x-ray shows no acute fractures, osteopenia [JH]   0014 XR chest 2 views  Chest x-ray shows no acute findings [JH]   0014 CT head wo IV contrast  No acute intracranial hemorrhage, mass effect or midline shift.       Precautions:   Medical Precautions: Fall precautions ([place], cognition)    ASSESSMENT  OT Assessment: Pt demonstrated decreased balance, strength , activity tolerance , and safety awareness  which impacted safe ADL/IADL completion. Pt is not at their baseline functional participation at this time. Pt would benefit from continued skilled occupational therapy services during hospitalization  in order to promote independence in daily routines for safe discharge.   . Pt with Decreased ADL status, Decreased upper extremity strength, Decreased safe judgment during ADL, Decreased endurance, Decreased functional mobility, Decreased cognition  Prognosis: Good  Barriers to discharge home: Caregiver assistance  Caregiver assistance needed per identified barriers - however, level of patient's required assistance exceeds assistance available at home    Tolerance: Patient tolerated treatment well    PLAN  Frequency: 3 times per week  Treatment Interventions: ADL retraining, Functional transfer training, UE strengthening/ROM, Endurance training, Patient/family training, Cognitive reorientation, Neuromuscular reeducation, Equipment evaluation/education  Discharge Recommendations: Moderate intensity level of continued care  OT OK to discharge: Yes    GENERAL VISIT INFORMATION   Start of session communication: Bedside nurse  End of session communication: Bedside nurse  Family/caregiver present: No  Caregiver feedback: n/a  Co-Treatment: PT  Reason for co-treatment: Co-eval and treat completed in order to optimize patient safety and function in order to promote OT goal achievement   Position Pt Received:   (ED COT with 2 rails up)  End of session position:  (ED COT 2 rails up)    SUBJECTIVE  Home Living:  Type of Home:  (Pt is a questionable historian, no family present to confirm. Pt reports she lives with her dtr in a 2 story home with 4 MARY and 1 HR. Bed and bath are onthe 2nd floor with a tub/shower or WIS.)     Prior Level of Function:  Level of [place]:  (Pt reports independence with functional mobility and ADLs with FWW. Pt has had falls at home per chart. Chart indicates Pt needs A at home and dtr is unable to provide)    Pain:  Assessment: 0-10  Score: 0 - No pain      OBJECTIVE  Cognition:  Overall Cognitive Status: Impaired (person only)     Current ADL function:   EATING:   (supervision)     GROOMING:   (min A seated EOB)     BATHING:  (mod A simulated)     UB DRESSING:  (min A seated in chair with arms)     LB DRESSING:  (mod A simulated in standing for clothing management over/off hips. min A for socks seated in chair with arms)     TOILETING:  (mod A simulated in standing - denies need to use restroom)      Activity Tolerance:  Endurance:  (fatigue however tolerates functional mobility ~10 min with nochange in vitals)    Bed Mobility/Transfers:   Bed Mobility  Bed Mobility: Yes  Bed Mobility 1  Bed Mobility 1: Supine to sitting  Level of Assistance 1: Close supervision  Bed Mobility 2  Bed Mobility  2: Sitting to supine  Level of Assistance 2: Close supervision       Functional Mobility:  Functional Mobility  Functional Mobility Performed:  (STS from EOB and chair with arms - min A and cues for safety. Min A for functional mobility in the room with FWW in prep for increased participation in daily routines.)    Sitting Balance:  Static Sitting Balance  Static Sitting-Balance Support:  (SBA)  Dynamic Sitting Balance  Dynamic Sitting-Balance Support:  (CGA)    Standing Balance:  Static Standing Balance  Static Standing-Balance Support:  (min A)  Dynamic Standing Balance  Dynamic Standing-Balance Support:  (min A)      Sensation:  Light Touch: No apparent deficits    Perception:  Inattention/Neglect: Appears intact    Coordination:  Movements are Fluid and Coordinated: Yes     Hand Function:  Hand Function  Gross Grasp: Functional    Extremities: RUE   RUE : Within Functional Limits and LUE   LUE: Within Functional Limits    Outcome Measures: [facility] Daily Activity   Putting on and taking off regular lower body clothing: A lot  Bathing (including washing, rinsing, drying): A lot  Putting on and taking off regular upper body clothing: A little  Toileting, which includes using toilet, bedpan or urinal: A lot  Taking care of personal grooming such as brushing teeth: A little   Eating Meals: A little   Daily Activity -  Total Score: 15    Therapeutic Intervention   Pt edu on role of OT in the acute care setting. Pt verbalized understanding   Pt edu to use call light and not attempt any functional mobility until staff present. Pt verbalized understanding.   Pt completed above Adls with assist noted including instructions and cuing to promote goal achievement and return to PLOF.     EDUCATION:     Education Documentation  Precautions, taught by Samy Staton OT at 4/5/2025 12:38 PM.  Learner: Patient  Readiness: Acceptance  Method: Explanation, Demonstration  Response: Needs Reinforcement    ADL Training, taught by Samy Staton OT at 4/5/2025 12:38 PM.  Learner: Patient  Readiness: Acceptance  Method: Explanation, Demonstration  Response: Needs Reinforcement    Education Comments  No comments found.        Goals:   Encounter Problems       Encounter Problems (Active)       ADLs       Patient will perform UB and LB bathing with minimal assist  level of assistance and PRN DME/AE.       Start:  04/05/25    Expected End:  04/18/25            Patient with complete upper body dressing with set-up level of assistance donning and doffing all UE clothes with PRN adaptive equipment        Start:  04/05/25    Expected End:  04/18/25            Patient with complete lower body dressing with supervision level of assistance donning and doffing all LE clothes  with PRN adaptive equipment        Start:  04/05/25    Expected End:  04/18/25            Patient will feed self with modified independent level of assistance and  using PRN adaptive equipment.       Start:  04/05/25    Expected End:  04/18/25            Patient will complete daily grooming tasks brushing teeth and washing face/hair with set-up level of assistance and PRN adaptive equipment .       Start:  04/05/25    Expected End:  04/18/25            Patient will complete toileting including hygiene clothing management/hygiene with stand by assist level of assistance.        Start:  04/05/25    Expected End:  04/18/25 04/05/25 at 12:40 PM - JIE MERCHANT, OT

## 2025-04-05 NOTE — H&P
[facility] - GENERAL MEDICINE HISTORY AND PHYSICAL    HISTORY OF PRESENT ILLNESS     Collateral History (Secondary Sources): D/w ED, chart review    History Of Present Illness (HPI):  Sylvia Moody is a 95 y.o. female with PMHx s/f CAD s/p prior CABG, HTN, DLD presenting with unwitnessed fall. She is a poor historian and doesn't remember what happened, most history obtained by chart review.  Per ambulance record, EMS was requested for patient with a fall and had a leg injury.  On EMS arrival family stated that she had fallen and was able to get her up into a chair.  Her family member thought she may have a broken hip as her leg appeared outwardly rotated.  She is acting normal for her per family and is usually ANO x 2 at baseline.  She is unable to recount how she fell or how long she was down or if she hit her head.  Denies headache, neck pain, chest pain, SOB, dysuria, focal or lateralizing weakness.    ED Course:   Vitals on presentation: T 36.4 °C (97.5 °F)  HR 52  BP (!) 188/84  RR 18  O2 99 % None (Room air)  Labs:   CMP, CBC largely unremarkable  Lactate 0.6  Troponin 66 BNP 42  UA-leukocyte esterase 500, WBC 21-50  EKG: Sinus rhythm at 77 bpm, RBBB and LAFB.  , QTc 502.  Imaging - agree with radiology interpretation(s):   XR tib-fib left, pelvis-no acute osseous abnormality denies generative changes  CT head-, CT cervical spine-no acute intracranial abnormality.  Encephalomalacia in the right hemisphere similar compared to prior imaging with remote infarct.  Nonspecific scattered white matter hypodensities favoring sequela small vessel ischemia.  Cervical spondylosis.  XR chest-no acute abnormality  XR shoulder left-degenerative changes and osteopenia  Interventions: Tylenol 650 mg, aspirin 81 mg,  ml, started on Keflex 500 mg every 12 hours, admission for further management    12-point ROS reviewed and found to be negative aside from aforementioned positives in HPI and/or  noted in dedicated ROS section below.     Decision made to admit the patient to the hospitalist service after evaluation of the patient, review of the above, and discussion with ED provider.     LABS AND IMAGING     I have personally reviewed the following labs on 04/05/25: CBC, CMP, Troponin, BNP, UA, and Lactate  I have personally reviewed the following imaging studies on 04/05/25: CXR, CT C-Spine, CT Head without Contrast , XR Tib-Fib (L), XR Pelvis, and XR Shoulder (L), with my personal interpretations as documented in ED course above.   I have personally reviewed any obtained EKGs on 04/05/25, with my interpretation as listed above in the ED summary course.   I have personally reviewed the patient's vitals on presentation to the ED and any/all changes through to time of admission (on 04/05/25).     ED Course (From ED Provider):  ED Course as of 04/05/25 1645   Sat Apr 05, 2025   0013 XR shoulder left 2+ views  Shoulder x-ray shows no acute fractures, osteopenia [JH]   0014 XR chest 2 views  Chest x-ray shows no acute findings [JH]   0014 CT head wo IV contrast  No acute intracranial hemorrhage, mass effect or midline shift.      Encephalomalacia in the right hemisphere similar compared to prior  imaging is compatible with remote infarct. Additional nonspecific  scattered white matter hypodensities favored to represent sequela of  small vessel ischemia. Cervical spondylosis without acute loss of  vertebral body height or traumatic malalignment.   [JH]   0015 CT cervical spine wo IV contrast  Cervical spondylosis without acute loss of  vertebral body height or traumatic malalignment.   [JH]   0015 XR pelvis 1-2 views  Pelvis x-ray is read as negative [JH]   0015 XR tibia fibula left 2 views  Tibia x-ray is read as negative [JH]   0015 Troponin I, High Sensitivity: 6  Troponin is normal at 6 x 2 [JH]   0015 BNP: 42 [JH]   0205 Urinalysis with Reflex Culture and Microscopic(!)  Urinalysis shows leukocyte Estrace  and white blood cells without bacteria or other signs of UTI []   0220 No cause of the patient's falls.  Did discuss with patient's son that patient is DNR/DNI.  No indications for admission family is asking for nursing home placement. []   0332 Patient able to ambulate with assistance with a walker here.  Daughter states she is no longer able to care for the patient at home.  Will consult social work, PT, OT.  Patient lives with her daughter Anat, [phone]. []      ED Course User Index  [] Abad Foss MD         Diagnoses as of 04/05/25 1645   Fall, initial encounter   Urinary tract infection without hematuria, site unspecified     Relevant Results  Results for orders placed or performed during the hospital encounter of 04/04/25 (from the past 24 hours)   CBC and Auto Differential   Result Value Ref Range    WBC 6.5 4.4 - 11.3 x10*3/uL    nRBC 0.0 0.0 - 0.0 /100 WBCs    RBC 3.40 (L) 4.00 - 5.20 x10*6/uL    Hemoglobin 10.4 (L) 12.0 - 16.0 g/dL    Hematocrit 32.3 (L) 36.0 - 46.0 %    MCV 95 80 - 100 fL    MCH 30.6 26.0 - 34.0 pg    MCHC 32.2 32.0 - 36.0 g/dL    RDW 18.6 (H) 11.5 - 14.5 %    Platelets 270 150 - 450 x10*3/uL    Neutrophils % 63.1 40.0 - 80.0 %    Immature Granulocytes %, Automated 0.3 0.0 - 0.9 %    Lymphocytes % 23.4 13.0 - 44.0 %    Monocytes % 11.2 2.0 - 10.0 %    Eosinophils % 1.2 0.0 - 6.0 %    Basophils % 0.8 0.0 - 2.0 %    Neutrophils Absolute 4.10 1.60 - 5.50 x10*3/uL    Immature Granulocytes Absolute, Automated 0.02 0.00 - 0.50 x10*3/uL    Lymphocytes Absolute 1.52 0.80 - 3.00 x10*3/uL    Monocytes Absolute 0.73 0.05 - 0.80 x10*3/uL    Eosinophils Absolute 0.08 0.00 - 0.40 x10*3/uL    Basophils Absolute 0.05 0.00 - 0.10 x10*3/uL   Comprehensive metabolic panel   Result Value Ref Range    Glucose 94 74 - 99 mg/dL    Sodium 136 136 - 145 mmol/L    Potassium 4.7 3.5 - 5.3 mmol/L    Chloride 109 (H) 98 - 107 mmol/L    Bicarbonate 20 (L) 21 - 32 mmol/L    Anion Gap 12 10 - 20 mmol/L     Urea Nitrogen 23 6 - 23 mg/dL    Creatinine 1.13 (H) 0.50 - 1.05 mg/dL    eGFR 45 (L) >60 mL/min/1.73m*2    Calcium 9.5 8.6 - 10.3 mg/dL    Albumin 3.6 3.4 - 5.0 g/dL    Alkaline Phosphatase 66 33 - 136 U/L    Total Protein 6.3 (L) 6.4 - 8.2 g/dL    AST 17 9 - 39 U/L    Bilirubin, Total 0.5 0.0 - 1.2 mg/dL    ALT 7 7 - 45 U/L   Lactate   Result Value Ref Range    Lactate 0.6 0.4 - 2.0 mmol/L   Troponin I, High Sensitivity   Result Value Ref Range    Troponin I, High Sensitivity 6 0 - 13 ng/L   Troponin I, High Sensitivity   Result Value Ref Range    Troponin I, High Sensitivity 6 0 - 13 ng/L   B-type natriuretic peptide   Result Value Ref Range    BNP 42 0 - 99 pg/mL   Urinalysis with Reflex Culture and Microscopic   Result Value Ref Range    Color, Urine Light-Yellow Light-Yellow, Yellow, Dark-Yellow    Appearance, Urine Clear Clear    Specific Gravity, Urine 1.018 1.005 - 1.035    pH, Urine 6.0 5.0, 5.5, 6.0, 6.5, 7.0, 7.5, 8.0    Protein, Urine NEGATIVE NEGATIVE, 10 (TRACE), 20 (TRACE) mg/dL    Glucose, Urine Normal Normal mg/dL    Blood, Urine NEGATIVE NEGATIVE mg/dL    Ketones, Urine NEGATIVE NEGATIVE mg/dL    Bilirubin, Urine NEGATIVE NEGATIVE mg/dL    Urobilinogen, Urine Normal Normal mg/dL    Nitrite, Urine NEGATIVE NEGATIVE    Leukocyte Esterase, Urine 500 Nancy/uL (A) NEGATIVE   Extra Urine Gray Tube   Result Value Ref Range    Extra Tube Hold for add-ons.    Microscopic Only, Urine   Result Value Ref Range    WBC, Urine 21-50 (A) 1-5, NONE /HPF    RBC, Urine 1-2 NONE, 1-2, 3-5 /HPF    Squamous Epithelial Cells, Urine 1-9 (SPARSE) Reference range not established. /HPF      Imaging  XR shoulder left 2+ views    Result Date: 4/5/2025  Advanced degenerative changes and osteopenia with calcific tendinopathy supraspinatus tendon. Signed by Tani Abraham, DO    XR chest 2 views    Result Date: 4/4/2025  No acute abnormality. Signed by Rush Martin, DO    CT head wo IV contrast    Result Date: 4/4/2025     No acute intracranial hemorrhage, mass effect or midline shift.   Encephalomalacia in the right hemisphere similar compared to prior imaging is compatible with remote infarct. Additional nonspecific scattered white matter hypodensities favored to represent sequela of small vessel ischemia. Cervical spondylosis without acute loss of vertebral body height or traumatic malalignment.     MACRO: None.   Signed by: Evan Finkelstein 4/4/2025 10:33 PM Dictation workstation:   [id number]    CT cervical spine wo IV contrast    Result Date: 4/4/2025    No acute intracranial hemorrhage, mass effect or midline shift.   Encephalomalacia in the right hemisphere similar compared to prior imaging is compatible with remote infarct. Additional nonspecific scattered white matter hypodensities favored to represent sequela of small vessel ischemia. Cervical spondylosis without acute loss of vertebral body height or traumatic malalignment.     MACRO: None.   Signed by: Evan Finkelstein 4/4/2025 10:33 PM Dictation workstation:   [id number]    XR pelvis 1-2 views    Result Date: 4/4/2025  No acute osseous abnormality. Bilateral degenerative changes of the hips.. Signed by Davin Doyle MD    XR tibia fibula left 2 views    Result Date: 4/4/2025  No acute osseous abnormality. Degenerative changes. Signed by Davin Doyle MD     Cardiology, Vascular, and Other Imaging  No other imaging results found for the past 2 days       PAST HISTORIES AND ALLERGIES     Past Medical History  She has a past medical history of Laceration without foreign body of unspecified hand, initial encounter (05/30/2017), Personal history of other diseases of the digestive system (06/25/2018), Vascular disorder of intestine, unspecified (Multi), and Volvulus (Multi).    Surgical History  She has a past surgical history that includes Other surgical history (06/25/2018).     Social History  She has no history on file for tobacco use, alcohol use, and drug  use.    Family History  No family history on file.    Allergies  Patient has no known allergies.    MEDICATIONS     Scheduled Medications:  [START ON 4/6/2025] aspirin, 81 mg, oral, Daily  atorvastatin, 20 mg, oral, Nightly  carvedilol, 3.125 mg, oral, BID  cephalexin, 500 mg, oral, q12h SURINDER  enoxaparin, 30 mg, subcutaneous, q24h  [START ON 4/6/2025] lisinopril, 10 mg, oral, Daily  [START ON 4/6/2025] pantoprazole, 40 mg, oral, Daily before breakfast   Or  [START ON 4/6/2025] pantoprazole, 40 mg, intravenous, Daily before breakfast      Continuous Medications:     PRN Medications:  PRN medications: acetaminophen, guaiFENesin, melatonin, ondansetron, polyethylene glycol     REVIEW OF SYSTEMS     Review of Systems   Unable to perform ROS: Dementia   Constitutional:  Positive for activity change. Negative for chills, fatigue and fever.   Respiratory:  Negative for cough, chest tightness, shortness of breath and wheezing.    Cardiovascular:  Negative for chest pain, palpitations and leg swelling.   Gastrointestinal:  Negative for abdominal pain, constipation, diarrhea, nausea and vomiting.   Genitourinary:  Negative for flank pain and hematuria.   Musculoskeletal:  Negative for back pain, joint swelling and neck pain.   Skin:  Negative for rash and wound.   Neurological:  Negative for tremors, syncope, weakness and headaches.       OBJECTIVE     Last Recorded Vitals  /84 (BP Location: Right arm, Patient Position: Sitting)   Pulse 84   Temp 36.4 °C (97.5 °F)   Resp 18   Wt 74.8 kg (165 lb)   SpO2 97%      Physical Exam:  Vital signs and nursing notes reviewed.   Constitutional: Pleasant and cooperative. Hard of hearing. Laying in bed in no acute distress. Conversant.   Skin: Warm and dry; no obvious lesions, rashes, pallor, or jaundice.   Eyes: EOMI. Anicteric sclera.   ENT: Mucous membranes moist; no obvious injury or deformity appreciated.   Head and Neck: Normocephalic, atraumatic. ROM preserved. Trachea  midline. No appreciable JVD.   Respiratory: Nonlabored on RA. Lungs clear to auscultation bilaterally without obvious adventitious sounds. Chest rise is equal.  Cardiovascular: RRR. No gross murmur, gallop, or rub. Extremities are warm and well-perfused with good capillary refill (< 3 seconds). No chest wall tenderness.   GI: Abdomen soft, nontender, nondistended. No obvious organomegaly appreciated. Bowel sounds are present.  : No CVA tenderness.   MSK: Tender to palpation of bilateral shin.  No limitations to AROM/PROM appreciated.   Extremities: No cyanosis, edema, or clubbing evident. Neurovascularly intact.   Neuro: A&Ox2, baseline per family. CN 2-12 grossly intact. Able to respond to questions appropriately and clearly. No acute focal neurologic deficits appreciated.  Psych: Appropriate mood and behavior.    ASSESSMENT AND PLAN   Assessment/Plan     95 y.o. female with PMHx s/f CAD s/p prior CABG, HTN, DLD presenting with unwitnessed fall.     Unwitnessed fall  Ambulatory dysfunction, inability to care for self  - CT head, CT cervical spine unremarkable  - PT OT eval  - Supportive care  -  following with her daughter to discharge to skilled SNF    Acute cystitis without hematuria  - Continue Keflex every 12 hours  - Urine culture pending    CAD status post prior CABG, HTN, DLD  - CAD: EKG and troponin negative; continue home aspirin and atorvastatin  - HTN: BP relatively controlled, continue home lisinopril and carvedilol with hold parameters  - DLD: Continue home atorvastatin    Diet: Cardiac  DVT Prophylaxis: SCDs, Lovenox   Code Status: DNR and No Intubation   Case Discussed With: ED provider  Additional Sources Reviewed: ED note day of admission;     Anticipated Length of Stay (LOS): Patient will require one-to-two midnight stay for further evaluation and management, pending results of above and/or input from consultants.      Edith Reddy PA-C    Dragon dictation software was used to  dictate this note and thus there may be minor errors in translation/transcription including garbled speech or misspellings. Please contact for clarification if needed.

## 2025-04-05 NOTE — ED PROVIDER NOTES
HPI   Chief Complaint   Patient presents with    Fall     Pt fell per EMS, which family called because they thought they seen possible outward hip rotation. Pt currently denying any pain. Swelling to the left ankle was observed. Pt currently AxOx3 with deficit to time. Pt GCS 15. Pt was able to bear weight on scene and walk to cot with assistance.        HPI: Patient is a 95-year-old female with a history of coronary artery disease status post CABG, hypertension, dyslipidemia, she is presenting to the emergency department after an unwitnessed fall.  Unknown whether it was a mechanical or syncopal fall.  Unknown if she hit her head or loss consciousness.  Family was concerned about a left hip and left tib-fib injury.  Patient states she did not hit her head but does not remember the entire accident.  Patient denies any current pain at this time.  Per EMS she was able to walk on scene and bear weight on both extremities      ROS: Complete 12 point review of systems performed, otherwise negative except as noted in the history of present illness    PMH: Reviewed, documented below in note. Pertinents in HPI  PSH: Reviewed and documented below in note. Pertinents in HPI  SH: Lives at home with family, no illicits  Fam: Reviewed, noncontributory to patients current complaint  MEDS: Reviewed and documented below in note. Pertinents in HPI  ALLERGIES: Reviewed and documented below in note.            History provided by:  Patient   used: No                          Óscar Coma Scale Score: 15                  Patient History   Past Medical History:   Diagnosis Date    Laceration without foreign body of unspecified hand, initial encounter 05/30/2017    Hand laceration    Personal history of other diseases of the digestive system 06/25/2018    History of gastroesophageal reflux (GERD)    Vascular disorder of intestine, unspecified (Multi)     Mesenteric ischemia    Volvulus (Multi)     Colonic volvulus      Past Surgical History:   Procedure Laterality Date    OTHER SURGICAL HISTORY  06/25/2018    Aortocoronary Bypass Graft Four Or More Coronary Arteries     No family history on file.  Social History     Tobacco Use    Smoking status: Not on file    Smokeless tobacco: Not on file   Substance Use Topics    Alcohol use: Not on file    Drug use: Not on file       Physical Exam   Visit Vitals  BP (!) 188/84   Pulse 52   Temp 36.4 °C (97.5 °F)   Resp 18   Ht 1.524 m (5')   Wt 74.8 kg (165 lb)   SpO2 99%   BMI 32.22 kg/m²   BSA 1.78 m²      Physical Exam  Vitals and nursing note reviewed.   Constitutional:       Appearance: Normal appearance.   HENT:      Head: Normocephalic and atraumatic.   Neck:      Vascular: No carotid bruit.   Cardiovascular:      Rate and Rhythm: Normal rate and regular rhythm.      Pulses: Normal pulses.      Heart sounds: Normal heart sounds.   Pulmonary:      Effort: Pulmonary effort is normal.      Breath sounds: Normal breath sounds.   Abdominal:      General: There is no distension.      Palpations: Abdomen is soft.      Tenderness: There is no abdominal tenderness. There is no guarding or rebound.   Musculoskeletal:         General: Tenderness present. No deformity or signs of injury.      Cervical back: Normal range of motion. No rigidity.      Comments: TTP of L shin. Full ROM of all extremities   Skin:     General: Skin is warm and dry.      Capillary Refill: Capillary refill takes less than 2 seconds.   Neurological:      General: No focal deficit present.      Mental Status: She is alert and oriented to person, place, and time.      Sensory: No sensory deficit.      Motor: No weakness.   Psychiatric:         Mood and Affect: Mood normal.         Behavior: Behavior normal.         CT head wo IV contrast    (Results Pending)   CT cervical spine wo IV contrast    (Results Pending)   XR chest 2 views    (Results Pending)   XR tibia fibula left 2 views    (Results Pending)   XR pelvis 1-2  views    (Results Pending)       Labs Reviewed   CBC WITH AUTO DIFFERENTIAL - Abnormal       Result Value    WBC 6.5      nRBC 0.0      RBC 3.40 (*)     Hemoglobin 10.4 (*)     Hematocrit 32.3 (*)     MCV 95      MCH 30.6      MCHC 32.2      RDW 18.6 (*)     Platelets 270      Neutrophils % 63.1      Immature Granulocytes %, Automated 0.3      Lymphocytes % 23.4      Monocytes % 11.2      Eosinophils % 1.2      Basophils % 0.8      Neutrophils Absolute 4.10      Immature Granulocytes Absolute, Automated 0.02      Lymphocytes Absolute 1.52      Monocytes Absolute 0.73      Eosinophils Absolute 0.08      Basophils Absolute 0.05     LACTATE - Normal    Lactate 0.6      Narrative:     Venipuncture immediately after or during the administration of Metamizole may lead to falsely low results. Testing should be performed immediately prior to Metamizole dosing.   COMPREHENSIVE METABOLIC PANEL   TROPONIN I, HIGH SENSITIVITY   URINALYSIS WITH REFLEX CULTURE AND MICROSCOPIC    Narrative:     The following orders were created for panel order Urinalysis with Reflex Culture and Microscopic.  Procedure                               Abnormality         Status                     ---------                               -----------         ------                     Urinalysis with Reflex Cu...[[id number]]                                                 Extra Urine Gray Tube[[id number]]                                                          Please view results for these tests on the individual orders.   URINALYSIS WITH REFLEX CULTURE AND MICROSCOPIC   EXTRA URINE GRAY TUBE         ED Course & MDM            Medical Decision Making  All mentioned lab results, ECGs, and imaging were independently reviewed by myself  - Patient evaluated. Patient is presenting to the emergency department today after an unwitnessed fall.  Will obtain the labs and urine to rule out any organic cause for the fall such as electrolyte derangements, ACS event,  UTI, or anemia.  Imaging will be obtained to rule out any intracranial hemorrhage, fractures or dislocations.  Patient signed out to the oncoming team pending her workup    - Monitored for any changes in stability or symptomatology. Patient remained stable.       *Disclaimer: This note was dictated by speech recognition. Minor errors in transcription may be present. Please call with questions.    Sai Savage MD             Your medication list      You have not been prescribed any medications.         Procedure  Procedures     *This report was transcribed using voice recognition software.  Every effort was made to ensure accuracy; however, inadvertent computerized transcription errors may be present.*  Mitch Savage MD  04/04/25         Mitch Savage MD  04/04/25 3874

## 2025-04-05 NOTE — PROGRESS NOTES
Patient care signed out to me by Dr. Savage    MDM/ED Course  ED Course as of 04/05/25 0731   Sat Apr 05, 2025   0013 XR shoulder left 2+ views  Shoulder x-ray shows no acute fractures, osteopenia [JH]   0014 XR chest 2 views  Chest x-ray shows no acute findings [JH]   0014 CT head wo IV contrast  No acute intracranial hemorrhage, mass effect or midline shift.      Encephalomalacia in the right hemisphere similar compared to prior  imaging is compatible with remote infarct. Additional nonspecific  scattered white matter hypodensities favored to represent sequela of  small vessel ischemia. Cervical spondylosis without acute loss of  vertebral body height or traumatic malalignment.   [JH]   0015 CT cervical spine wo IV contrast  Cervical spondylosis without acute loss of  vertebral body height or traumatic malalignment.   [JH]   0015 XR pelvis 1-2 views  Pelvis x-ray is read as negative [JH]   0015 XR tibia fibula left 2 views  Tibia x-ray is read as negative [JH]   0015 Troponin I, High Sensitivity: 6  Troponin is normal at 6 x 2 [JH]   0015 BNP: 42 [JH]   0205 Urinalysis with Reflex Culture and Microscopic(!)  Urinalysis shows leukocyte Estrace and white blood cells without bacteria or other signs of UTI [JH]   0220 No cause of the patient's falls.  Did discuss with patient's son that patient is DNR/DNI.  No indications for admission family is asking for nursing home placement. [JH]   0332 Patient able to ambulate with assistance with a walker here.  Daughter states she is no longer able to care for the patient at home.  Will consult social work, PT, OT.  Patient lives with her daughter Anat, [phone]. []      ED Course User Index  [] Abad Foss MD         Diagnoses as of 04/05/25 0731   Fall, initial encounter

## 2025-04-06 ENCOUNTER — APPOINTMENT (OUTPATIENT)
Dept: CARDIOLOGY | Facility: HOSPITAL | Age: OVER 89
End: 2025-04-06
Payer: MEDICARE

## 2025-04-06 VITALS
RESPIRATION RATE: 16 BRPM | TEMPERATURE: 97.3 F | HEART RATE: 56 BPM | SYSTOLIC BLOOD PRESSURE: 177 MMHG | BODY MASS INDEX: 27.47 KG/M2 | DIASTOLIC BLOOD PRESSURE: 89 MMHG | HEIGHT: 65 IN | OXYGEN SATURATION: 95 % | WEIGHT: 164.9 LBS

## 2025-04-06 LAB
ALBUMIN SERPL BCP-MCNC: 3.6 G/DL (ref 3.4–5)
ALP SERPL-CCNC: 55 U/L (ref 33–136)
ALT SERPL W P-5'-P-CCNC: 6 U/L (ref 7–45)
ANION GAP SERPL CALC-SCNC: 17 MMOL/L (ref 10–20)
AST SERPL W P-5'-P-CCNC: 14 U/L (ref 9–39)
BACTERIA UR CULT: NORMAL
BILIRUB SERPL-MCNC: 0.9 MG/DL (ref 0–1.2)
BUN SERPL-MCNC: 17 MG/DL (ref 6–23)
CALCIUM SERPL-MCNC: 9.7 MG/DL (ref 8.6–10.3)
CHLORIDE SERPL-SCNC: 104 MMOL/L (ref 98–107)
CO2 SERPL-SCNC: 23 MMOL/L (ref 21–32)
CREAT SERPL-MCNC: 1.09 MG/DL (ref 0.5–1.05)
EGFRCR SERPLBLD CKD-EPI 2021: 47 ML/MIN/1.73M*2
ERYTHROCYTE [DISTWIDTH] IN BLOOD BY AUTOMATED COUNT: 18.3 % (ref 11.5–14.5)
GLUCOSE SERPL-MCNC: 83 MG/DL (ref 74–99)
HCT VFR BLD AUTO: 31.4 % (ref 36–46)
HGB BLD-MCNC: 10.1 G/DL (ref 12–16)
MCH RBC QN AUTO: 30.5 PG (ref 26–34)
MCHC RBC AUTO-ENTMCNC: 32.2 G/DL (ref 32–36)
MCV RBC AUTO: 95 FL (ref 80–100)
NRBC BLD-RTO: 0 /100 WBCS (ref 0–0)
PLATELET # BLD AUTO: 293 X10*3/UL (ref 150–450)
POTASSIUM SERPL-SCNC: 4.1 MMOL/L (ref 3.5–5.3)
PROT SERPL-MCNC: 6.3 G/DL (ref 6.4–8.2)
RBC # BLD AUTO: 3.31 X10*6/UL (ref 4–5.2)
SODIUM SERPL-SCNC: 140 MMOL/L (ref 136–145)
WBC # BLD AUTO: 6 X10*3/UL (ref 4.4–11.3)

## 2025-04-06 PROCEDURE — 36415 COLL VENOUS BLD VENIPUNCTURE: CPT

## 2025-04-06 PROCEDURE — 2500000001 HC RX 250 WO HCPCS SELF ADMINISTERED DRUGS (ALT 637 FOR MEDICARE OP)

## 2025-04-06 PROCEDURE — 93010 ELECTROCARDIOGRAM REPORT: CPT | Performed by: INTERNAL MEDICINE

## 2025-04-06 PROCEDURE — 93005 ELECTROCARDIOGRAM TRACING: CPT

## 2025-04-06 PROCEDURE — 84075 ASSAY ALKALINE PHOSPHATASE: CPT

## 2025-04-06 PROCEDURE — 99232 SBSQ HOSP IP/OBS MODERATE 35: CPT | Performed by: INTERNAL MEDICINE

## 2025-04-06 PROCEDURE — 2500000001 HC RX 250 WO HCPCS SELF ADMINISTERED DRUGS (ALT 637 FOR MEDICARE OP): Performed by: STUDENT IN AN ORGANIZED HEALTH CARE EDUCATION/TRAINING PROGRAM

## 2025-04-06 PROCEDURE — 85027 COMPLETE CBC AUTOMATED: CPT

## 2025-04-06 PROCEDURE — 2500000004 HC RX 250 GENERAL PHARMACY W/ HCPCS (ALT 636 FOR OP/ED)

## 2025-04-06 PROCEDURE — G0378 HOSPITAL OBSERVATION PER HR: HCPCS

## 2025-04-06 PROCEDURE — 96372 THER/PROPH/DIAG INJ SC/IM: CPT

## 2025-04-06 PROCEDURE — 2500000001 HC RX 250 WO HCPCS SELF ADMINISTERED DRUGS (ALT 637 FOR MEDICARE OP): Performed by: EMERGENCY MEDICINE

## 2025-04-06 RX ADMIN — ATORVASTATIN CALCIUM 20 MG: 20 TABLET, FILM COATED ORAL at 21:25

## 2025-04-06 RX ADMIN — CEPHALEXIN 500 MG: 500 CAPSULE ORAL at 07:42

## 2025-04-06 RX ADMIN — ASPIRIN 81 MG: 81 TABLET, COATED ORAL at 07:42

## 2025-04-06 RX ADMIN — ACETAMINOPHEN 650 MG: 325 TABLET ORAL at 08:49

## 2025-04-06 RX ADMIN — ACETAMINOPHEN 650 MG: 325 TABLET ORAL at 13:14

## 2025-04-06 RX ADMIN — ENOXAPARIN SODIUM 30 MG: 30 INJECTION SUBCUTANEOUS at 16:34

## 2025-04-06 RX ADMIN — CARVEDILOL 3.12 MG: 3.12 TABLET, FILM COATED ORAL at 07:41

## 2025-04-06 RX ADMIN — LISINOPRIL 10 MG: 10 TABLET ORAL at 07:41

## 2025-04-06 ASSESSMENT — PAIN DESCRIPTION - ORIENTATION
ORIENTATION: LEFT
ORIENTATION: LEFT

## 2025-04-06 ASSESSMENT — COGNITIVE AND FUNCTIONAL STATUS - GENERAL
EATING MEALS: A LITTLE
DRESSING REGULAR LOWER BODY CLOTHING: A LITTLE
MOBILITY SCORE: 20
DAILY ACTIVITIY SCORE: 19
TOILETING: A LITTLE
HELP NEEDED FOR BATHING: A LITTLE
CLIMB 3 TO 5 STEPS WITH RAILING: A LITTLE
DRESSING REGULAR UPPER BODY CLOTHING: A LITTLE
TOILETING: A LITTLE
MOVING TO AND FROM BED TO CHAIR: A LITTLE
STANDING UP FROM CHAIR USING ARMS: A LITTLE
DAILY ACTIVITIY SCORE: 18
HELP NEEDED FOR BATHING: A LITTLE
WALKING IN HOSPITAL ROOM: A LITTLE
CLIMB 3 TO 5 STEPS WITH RAILING: A LITTLE
DRESSING REGULAR LOWER BODY CLOTHING: A LITTLE
PERSONAL GROOMING: A LITTLE
WALKING IN HOSPITAL ROOM: A LITTLE
DRESSING REGULAR UPPER BODY CLOTHING: A LITTLE
STANDING UP FROM CHAIR USING ARMS: A LITTLE
MOVING TO AND FROM BED TO CHAIR: A LITTLE
MOBILITY SCORE: 20
PERSONAL GROOMING: A LITTLE

## 2025-04-06 ASSESSMENT — PAIN SCALES - GENERAL
PAINLEVEL_OUTOF10: 0 - NO PAIN
PAINLEVEL_OUTOF10: 4
PAINLEVEL_OUTOF10: 0 - NO PAIN
PAINLEVEL_OUTOF10: 6

## 2025-04-06 ASSESSMENT — PAIN - FUNCTIONAL ASSESSMENT
PAIN_FUNCTIONAL_ASSESSMENT: 0-10

## 2025-04-06 ASSESSMENT — PAIN DESCRIPTION - LOCATION
LOCATION: SHOULDER
LOCATION: ARM

## 2025-04-06 NOTE — SIGNIFICANT EVENT
Notified that pt was bradycardic in the 40s    ECG showed sinus ginette. Her BP is normal and she arouses easily when stimulated.  She is on a low dose of coreg which I will hold    Will continue to monitor closely.  Pt does have advanced dementia and is DNR/DNI

## 2025-04-06 NOTE — PROGRESS NOTES
04/06/25 1236   Discharge Planning   Living Arrangements Children   Support Systems Children   Assistance Needed independent at baseline   Type of Residence Private residence   Home or Post Acute Services Post acute facilities (Rehab/SNF/etc)   Type of Post Acute Facility Services Skilled nursing   Expected Discharge Disposition SNF   Does the patient need discharge transport arranged? Yes   RoundTrip coordination needed? Yes   Patient Choice   Provider Choice list and CMS website (https://medicare.gov/care-compare#search) for post-acute Quality and Resource Measure Data were provided and reviewed with: Family   Intensity of Service   Intensity of Service 0-30 min     Spoke to daughter in regards to discharge planning. Patient from home with daughter, whom does travel a lot for work, she has other caregivers in place when she is not home, as well as cameras in the home to monitor her mother. She is confused at this time, per daughter memory comes and goes at times. She is admitted for a fall, [facility]APC 15, recommend SNF placement. [facility] list was sent yesterday for review. Choices are [facility], [facility] and [facility]. Will have referrals sent for review. She will require [employer] Medicare auth for placement. Will follow.

## 2025-04-06 NOTE — NURSING NOTE
Patient bradycardic and having L arm pain. Service notified. EKG showed bradycardia. Coreg held. Will continue to monitor patient.       04/06/25 at 3:24 PM - BRANDI MARTINEZ RN

## 2025-04-06 NOTE — PROGRESS NOTES
"SUBJECTIVE     Pt requiring sitter due to impulsivity  Has been having visual hallucinations    OBJECTIVE:       Physical Exam  /76 (BP Location: Left arm, Patient Position: Lying)   Pulse 52   Temp 36.3 °C (97.3 °F) (Temporal)   Resp 17   Ht 1.651 m (5' 5\")   Wt 74.8 kg (164 lb 14.5 oz)   SpO2 90%   BMI 27.44 kg/m²   Body mass index is 27.44 kg/m².    GEN - NAD  PULM - CTA  CVS - RRR  ABD - soft and nontender  EXTR - trace bilat LE edema    Scheduled medications  aspirin, 81 mg, oral, Daily  atorvastatin, 20 mg, oral, Nightly  carvedilol, 3.125 mg, oral, BID  cephalexin, 500 mg, oral, q12h SURINDER  enoxaparin, 30 mg, subcutaneous, q24h  lisinopril, 10 mg, oral, Daily  pantoprazole, 40 mg, oral, Daily before breakfast   Or  pantoprazole, 40 mg, intravenous, Daily before breakfast      Continuous medications     PRN medications  PRN medications: acetaminophen, guaiFENesin, melatonin, ondansetron, polyethylene glycol    Labs  Results from last 7 days   Lab Units 04/06/25  0529 04/04/25  2114   WBC AUTO x10*3/uL 6.0 6.5   HEMOGLOBIN g/dL 10.1* 10.4*   HEMATOCRIT % 31.4* 32.3*   PLATELETS AUTO x10*3/uL 293 270     Results from last 7 days   Lab Units 04/06/25  0529 04/04/25  2114   SODIUM mmol/L 140 136   POTASSIUM mmol/L 4.1 4.7   CHLORIDE mmol/L 104 109*   CO2 mmol/L 23 20*   BUN mg/dL 17 23   CREATININE mg/dL 1.09* 1.13*   CALCIUM mg/dL 9.7 9.5   PROTEIN TOTAL g/dL 6.3* 6.3*   BILIRUBIN TOTAL mg/dL 0.9 0.5   ALK PHOS U/L 55 66   ALT U/L 6* 7   AST U/L 14 17   GLUCOSE mg/dL 83 94       Microbiology:   No results found for the last 90 days.                   No lab exists for component: \"AGALPCRNB\"   .ID  Lab Results   Component Value Date    URINECULTURE  04/05/2025     Growth indicates contamination with periurethral roly. Repeat culture if clinically indicated.         ASSESSMENT & PLAN:     1)  Fall/Debility  -  no fractures or acute injuries found during workup  - PT eval pending and will likely need " SNF.  [facility] if not [facility] for [facility] it sounds like she will need placement due to presumed dementia and unsafe living independently  - sounds like family has 24/7 caregivers and has cameras in the home for safety  - no other acute metabolic or infectious etiologies found    2)  Possible UTI  -  empiric Keflex started but culture suggests contamination >> will stop abx    3) CAD/HTN  -  resumed home meds  - has been bradycardic and will hold coreg        Disposition  SNF vs long term care

## 2025-04-07 LAB
ATRIAL RATE: 47 BPM
ATRIAL RATE: 78 BPM
P AXIS: 58 DEGREES
P AXIS: 72 DEGREES
PR INTERVAL: 212 MS
PR INTERVAL: 219 MS
Q ONSET: 252 MS
Q ONSET: 253 MS
QRS COUNT: 12 BEATS
QRS COUNT: 7 BEATS
QRS DURATION: 137 MS
QRS DURATION: 158 MS
QT INTERVAL: 443 MS
QT INTERVAL: 484 MS
QTC CALCULATION(BAZETT): 428 MS
QTC CALCULATION(BAZETT): 502 MS
QTC FREDERICIA: 446 MS
QTC FREDERICIA: 481 MS
R AXIS: -57 DEGREES
R AXIS: -59 DEGREES
T AXIS: -30 DEGREES
T AXIS: 18 DEGREES
T OFFSET: 474 MS
T OFFSET: 495 MS
VENTRICULAR RATE: 47 BPM
VENTRICULAR RATE: 77 BPM

## 2025-04-07 PROCEDURE — 97530 THERAPEUTIC ACTIVITIES: CPT | Mod: GP,CQ

## 2025-04-07 PROCEDURE — 2500000001 HC RX 250 WO HCPCS SELF ADMINISTERED DRUGS (ALT 637 FOR MEDICARE OP)

## 2025-04-07 PROCEDURE — 99233 SBSQ HOSP IP/OBS HIGH 50: CPT | Performed by: FAMILY MEDICINE

## 2025-04-07 PROCEDURE — 2500000004 HC RX 250 GENERAL PHARMACY W/ HCPCS (ALT 636 FOR OP/ED)

## 2025-04-07 PROCEDURE — 97530 THERAPEUTIC ACTIVITIES: CPT | Mod: GO,CO

## 2025-04-07 PROCEDURE — G0378 HOSPITAL OBSERVATION PER HR: HCPCS

## 2025-04-07 PROCEDURE — 96372 THER/PROPH/DIAG INJ SC/IM: CPT

## 2025-04-07 PROCEDURE — 97535 SELF CARE MNGMENT TRAINING: CPT | Mod: GO,CO

## 2025-04-07 PROCEDURE — 2500000001 HC RX 250 WO HCPCS SELF ADMINISTERED DRUGS (ALT 637 FOR MEDICARE OP): Performed by: EMERGENCY MEDICINE

## 2025-04-07 PROCEDURE — 97116 GAIT TRAINING THERAPY: CPT | Mod: GP,CQ

## 2025-04-07 RX ADMIN — ASPIRIN 81 MG: 81 TABLET, COATED ORAL at 09:58

## 2025-04-07 RX ADMIN — LISINOPRIL 10 MG: 10 TABLET ORAL at 09:58

## 2025-04-07 RX ADMIN — ENOXAPARIN SODIUM 30 MG: 30 INJECTION SUBCUTANEOUS at 16:40

## 2025-04-07 RX ADMIN — ATORVASTATIN CALCIUM 20 MG: 20 TABLET, FILM COATED ORAL at 20:24

## 2025-04-07 ASSESSMENT — COGNITIVE AND FUNCTIONAL STATUS - GENERAL
TOILETING: A LITTLE
EATING MEALS: A LITTLE
DAILY ACTIVITIY SCORE: 18
WALKING IN HOSPITAL ROOM: A LITTLE
WALKING IN HOSPITAL ROOM: A LITTLE
MOBILITY SCORE: 20
HELP NEEDED FOR BATHING: A LITTLE
STANDING UP FROM CHAIR USING ARMS: A LITTLE
DRESSING REGULAR UPPER BODY CLOTHING: A LITTLE
DRESSING REGULAR LOWER BODY CLOTHING: A LITTLE
EATING MEALS: A LITTLE
DRESSING REGULAR LOWER BODY CLOTHING: A LITTLE
CLIMB 3 TO 5 STEPS WITH RAILING: A LITTLE
PERSONAL GROOMING: A LITTLE
TOILETING: A LOT
DRESSING REGULAR UPPER BODY CLOTHING: A LITTLE
EATING MEALS: A LITTLE
MOVING TO AND FROM BED TO CHAIR: A LITTLE
CLIMB 3 TO 5 STEPS WITH RAILING: A LITTLE
TOILETING: A LITTLE
PERSONAL GROOMING: A LITTLE
HELP NEEDED FOR BATHING: A LITTLE
HELP NEEDED FOR BATHING: A LOT
PERSONAL GROOMING: A LITTLE
MOVING TO AND FROM BED TO CHAIR: A LITTLE
STANDING UP FROM CHAIR USING ARMS: A LITTLE
MOVING FROM LYING ON BACK TO SITTING ON SIDE OF FLAT BED WITH BEDRAILS: A LITTLE
DRESSING REGULAR UPPER BODY CLOTHING: A LITTLE
DAILY ACTIVITIY SCORE: 16
DAILY ACTIVITIY SCORE: 18
WALKING IN HOSPITAL ROOM: A LOT
TURNING FROM BACK TO SIDE WHILE IN FLAT BAD: A LITTLE
CLIMB 3 TO 5 STEPS WITH RAILING: TOTAL
MOBILITY SCORE: 15
MOVING TO AND FROM BED TO CHAIR: A LITTLE
DRESSING REGULAR LOWER BODY CLOTHING: A LITTLE
MOBILITY SCORE: 20
STANDING UP FROM CHAIR USING ARMS: A LITTLE

## 2025-04-07 ASSESSMENT — PAIN SCALES - GENERAL
PAINLEVEL_OUTOF10: 0 - NO PAIN

## 2025-04-07 ASSESSMENT — ACTIVITIES OF DAILY LIVING (ADL): HOME_MANAGEMENT_TIME_ENTRY: 10

## 2025-04-07 ASSESSMENT — PAIN - FUNCTIONAL ASSESSMENT: PAIN_FUNCTIONAL_ASSESSMENT: 0-10

## 2025-04-07 ASSESSMENT — PAIN SCALES - WONG BAKER: WONGBAKER_NUMERICALRESPONSE: NO HURT

## 2025-04-07 NOTE — PROGRESS NOTES
Social work consult placed for SNF placement. SW reviewed pt's chart and communicated with TCC. No SW needs foreseen at this time, TCC to address DC planning needs. SW signing off; available upon request.    Wu Perez, MSW, LSW ([id number])   Care Transitions

## 2025-04-07 NOTE — PROGRESS NOTES
Physical Therapy    Physical Therapy Treatment    Patient Name: Sylvia Moody  MRN: [id number]  Department: [facility] 2 E OBS  Room: [facility]2311-A  Today's Date: 4/7/2025  Time Calculation  Start Time: 1031  Stop Time: 1056  Time Calculation (min): 25 min         Assessment/Plan   PT Assessment  PT Assessment Results: Decreased strength, Decreased endurance, Impaired balance  Rehab Prognosis: Good  Barriers to Discharge Home: Physical needs, Caregiver assistance, Cognition needs  Evaluation/Treatment Tolerance: Patient limited by fatigue  End of Session Communication: PCT/NA/CTA  Assessment Comment: pt up in chair with PCA present. pt confused and required cues on hand placement with transfers and safety. pt amb into restroom wiht min assist on FWW navigation. pt took a seated reset break in chair then stood up again.  End of Session Patient Position: Up in chair, Alarm on     PT Plan  Treatment/Interventions: Transfer training, Gait training, Stair training, Balance training, Neuromuscular re-education, Strengthening, Endurance training, Therapeutic exercise, Therapeutic activity, Postural re-education, Positioning, Bed mobility  PT Plan: Ongoing PT  PT Frequency: 3 times per week  PT Discharge Recommendations: Moderate intensity level of continued care      General Visit Information:   PT  Visit  PT Received On: 04/07/25       Subjective   Precautions:               Objective   Pain:  Pain Assessment  0-10 (Numeric) Pain Score: 0 - No pain  Cognition:  Cognition  Overall Cognitive Status: Impaired  Orientation Level: Disoriented to situation, Disoriented to time, Disoriented to place  Coordination:          Treatments:            Ambulation/Gait Training 1  Surface 1: Level tile  Device 1: Rolling walker  Assistance 1: Minimum assistance  Quality of Gait 1: Decreased step length, Diminished heel strike, Forward flexed posture  Comments/Distance (ft) 1: 15 x2  Transfer 1  Technique 1: Sit to stand, Stand to sit  Transfer  Device 1: Walker  Transfer Level of Assistance 1: Minimum assistance  Trials/Comments 1: x2 chair, 1x toilet    Outcome Measures:  [facility] Basic Mobility  Turning from your back to your side while in a flat bed without using bedrails: A little  Moving from lying on your back to sitting on the side of a flat bed without using bedrails: A little  Moving to and from bed to chair (including a wheelchair): A little  Standing up from a chair using your arms (e.g. wheelchair or bedside chair): A little  To walk in hospital room: A lot  Climbing 3-5 steps with railing: Total  Basic Mobility - Total Score: 15    Education Documentation  Mobility Training, taught by Sera Rodriguez PTA at 4/7/2025 11:27 AM.  Learner: Patient  Readiness: Acceptance  Method: Explanation  Response: Verbalizes Understanding    Education Comments  No comments found.        OP EDUCATION:       Encounter Problems       Encounter Problems (Active)       Balance       STG - Maintains static standing balance without upper extremity support and Supervision (Progressing)       Start:  04/05/25    Expected End:  04/19/25       INTERVENTIONS:1. Practice standing with minimal support.2. Educate patient about maintaining total hip precautions while maintaining balance.3. Educate patient about standing tolerance.4. Educate patient about independence with gait, transfers, and ADL's.5. Educate patient about use of assistive device.6. Educate patient about self-directed care.            Mobility       STG - Patient will ambulate 50ft with FWW safely with Supervision (Progressing)       Start:  04/05/25    Expected End:  04/19/25               PT Transfers       STG - Patient will transfer sit to and from stand using fWW with safe technique and Supervision (Progressing)       Start:  04/05/25    Expected End:  04/19/25               Pain - Adult

## 2025-04-07 NOTE — PROGRESS NOTES
Occupational Therapy    OT Treatment    Patient Name: Sylvia Moody  MRN: [id number]  Department: [facility] 2 E OBS  Room: [facility]2311-A  Today's Date: 4/7/2025  Time Calculation  Start Time: 1032  Stop Time: 1055  Time Calculation (min): 23 min        Assessment: progressing toward goals MIN A for transfers   Barriers to Discharge Home: Caregiver assistance  End of Session Communication: Bedside nurse, PCT/NA/CTA  End of Session Patient Position: Up in chair, Alarm on     Plan:  Treatment Interventions: ADL retraining, Functional transfer training, UE strengthening/ROM, Endurance training, Patient/family training, Cognitive reorientation, Neuromuscular reeducation, Equipment evaluation/education  OT Frequency: 3 times per week  OT Discharge Recommendations: Moderate intensity level of continued care  Equipment Recommended upon Discharge: Other (comment) (TBD)  OT - OK to Discharge: Yes  Treatment Interventions: ADL retraining, Functional transfer training, UE strengthening/ROM, Endurance training, Patient/family training, Cognitive reorientation, Neuromuscular reeducation, Equipment evaluation/education    Subjective   Previous Visit Info:  OT Last Visit  OT Received On: 04/07/25  General:  General  Prior to Session Communication: PCT/NA/CTA  Patient Position Received: Up in chair, Alarm off, caregiver present  General Comment: pt. is MIN A x1 for transfers and toileting. She is [place] needs verbal cueing for safety.          Pain:  Pain Assessment  Pain Assessment: 0-10  0-10 (Numeric) Pain Score: 0 - No pain    Objective    Cognition:  Cognition  Overall Cognitive Status:  ([place])  Orientation Level: Disoriented to situation, Disoriented to time    Activities of Daily Living:      Grooming  Grooming Level of Assistance: Setup, Minimum assistance  Grooming Where Assessed: Chair, Standing sinkside  Grooming Comments: brush hair/ wash hand at sink      Toileting  Toileting Level of Assistance: Minimum assistance  Where Assessed:  Toilet  Toileting Comments: pt. able to manage own underwear but needed MIN A for balance    Bed Mobility/Transfers:      Transfers  Transfer: Yes  Transfer 1  Transfer From 1: Chair with arms to  Transfer to 1: Toilet  Technique 1: Sit to stand, Stand to sit  Transfer Device 1: Walker  Transfer Level of Assistance 1: Minimum assistance  Trials/Comments 1: cues for hand placement  Transfers 2  Transfer From 2: Toilet to  Transfer to 2: Chair with arms  Technique 2: Sit to stand, Stand to sit  Transfer Device 2: Walker  Transfer Level of Assistance 2: Minimum assistance  Trials/Comments 2: cues for hand placement         Functional Mobility:  Functional Mobility  Functional Mobility Performed: Yes  Functional Mobility 1  Surface 1:  (outside of room)  Device 1: Rolling walker  Assistance 1: Minimum assistance  Comments 1: cues for safety and managing FWW          Outcome Measures:[facility] Daily Activity  Putting on and taking off regular lower body clothing: A little  Bathing (including washing, rinsing, drying): A lot  Putting on and taking off regular upper body clothing: A little  Toileting, which includes using toilet, bedpan or urinal: A lot  Taking care of personal grooming such as brushing teeth: A little  Eating Meals: A little  Daily Activity - Total Score: 16        Education Documentation  Precautions, taught by LYLE Ybarra at 4/7/2025 11:00 AM.  Learner: Patient  Readiness: Acceptance  Method: Explanation  Response: Demonstrated Understanding, Verbalizes Understanding    ADL Training, taught by LYLE Ybarra at 4/7/2025 11:00 AM.  Learner: Patient  Readiness: Acceptance  Method: Explanation  Response: Demonstrated Understanding, Verbalizes Understanding    Education Comments  No comments found.        OP EDUCATION:       Goals:  Encounter Problems       Encounter Problems (Active)       ADLs       Patient will perform UB and LB bathing with minimal assist  level of assistance and PRN  DME/AE. (Not Progressing)       Start:  04/05/25    Expected End:  04/18/25            Patient with complete upper body dressing with set-up level of assistance donning and doffing all UE clothes with PRN adaptive equipment  (Not Progressing)       Start:  04/05/25    Expected End:  04/18/25            Patient with complete lower body dressing with supervision level of assistance donning and doffing all LE clothes  with PRN adaptive equipment  (Progressing)       Start:  04/05/25    Expected End:  04/18/25            Patient will feed self with modified independent level of assistance and  using PRN adaptive equipment. (Progressing)       Start:  04/05/25    Expected End:  04/18/25            Patient will complete daily grooming tasks brushing teeth and washing face/hair with set-up level of assistance and PRN adaptive equipment . (Progressing)       Start:  04/05/25    Expected End:  04/18/25            Patient will complete toileting including hygiene clothing management/hygiene with stand by assist level of assistance. (Progressing)       Start:  04/05/25    Expected End:  04/18/25

## 2025-04-07 NOTE — PROGRESS NOTES
"SUBJECTIVE     Pt requiring sitter due to impulsivity  Has been having visual hallucinations    4/7:              Today the patient is found awake alert and interactive appropriately sitting in the bedside chair.  Complains only of feeling tired and weak.  No acute events overnight.  Her Coreg was held yesterday due to bradycardia.  Continues today with heart rate ranging from 54-79.  Asymptomatic with this.  Blood pressure stable.  Otherwise denies interval new symptoms or complaints including chest pain palpitations pleuritic type pain unusual shortness of breath cough sputum nausea abdominal pain flank pain fever or chills.    OBJECTIVE:       Physical Exam  /70 (BP Location: Right arm, Patient Position: Sitting)   Pulse 57   Temp 36.7 °C (98.1 °F) (Temporal)   Resp 16   Ht 1.651 m (5' 5\")   Wt 74.8 kg (164 lb 14.5 oz)   SpO2 94%   BMI 27.44 kg/m²   Body mass index is 27.44 kg/m².    GEN - NAD, slender elderly  female awake alert and interactive appropriately  PULM - CTA  CVS - RRR  ABD - soft and nontender  EXTR - trace bilat LE edema  Psych: Pleasant and cooperative to exam    Scheduled medications  aspirin, 81 mg, oral, Daily  atorvastatin, 20 mg, oral, Nightly  [Held by provider] carvedilol, 3.125 mg, oral, BID  enoxaparin, 30 mg, subcutaneous, q24h  lisinopril, 10 mg, oral, Daily  pantoprazole, 40 mg, oral, Daily before breakfast   Or  pantoprazole, 40 mg, intravenous, Daily before breakfast      Continuous medications     PRN medications  PRN medications: acetaminophen, guaiFENesin, melatonin, ondansetron, polyethylene glycol    Labs  Results from last 7 days   Lab Units 04/06/25 0529 04/04/25 2114   WBC AUTO x10*3/uL 6.0 6.5   HEMOGLOBIN g/dL 10.1* 10.4*   HEMATOCRIT % 31.4* 32.3*   PLATELETS AUTO x10*3/uL 293 270     Results from last 7 days   Lab Units 04/06/25  0529 04/04/25 2114   SODIUM mmol/L 140 136   POTASSIUM mmol/L 4.1 4.7   CHLORIDE mmol/L 104 109*   CO2 mmol/L 23 20* " "  BUN mg/dL 17 23   CREATININE mg/dL 1.09* 1.13*   CALCIUM mg/dL 9.7 9.5   PROTEIN TOTAL g/dL 6.3* 6.3*   BILIRUBIN TOTAL mg/dL 0.9 0.5   ALK PHOS U/L 55 66   ALT U/L 6* 7   AST U/L 14 17   GLUCOSE mg/dL 83 94       Microbiology:   No results found for the last 90 days.                   No lab exists for component: \"AGALPCRNB\"   .ID  Lab Results   Component Value Date    URINECULTURE  04/05/2025     Growth indicates contamination with periurethral roly. Repeat culture if clinically indicated.         ASSESSMENT & PLAN:     1)  Fall/Debility  -  no fractures or acute injuries found during workup  - PT eval pending and will likely need SNF.  PT AMPAC is 16 - even if not qualifying for SNF it sounds like she will need placement due to presumed dementia and unsafe living independently  - sounds like family has 24/7 caregivers and has cameras in the home for safety  - no other acute metabolic or infectious etiologies found  4/7: AM-PAC 15.  Working towards SNF.    2)  Possible UTI  -  empiric Keflex started but culture suggests contamination >> will stop abx    3) CAD/HTN  -  resumed home meds  - has been bradycardic and will hold coreg  4/7: Continues today with mild asymptomatic bradycardia.  Continue to monitor.        Disposition  SNF           "

## 2025-04-07 NOTE — PROGRESS NOTES
Spoke to patients daughter, updated her that [facility] is the only facility interested but they have not fully committed. She would like to continue with [facility] as [facility]. She will review list for additional choices.     [facility] is able to accept patient. They will be starting auth. Updated patients daughter.

## 2025-04-08 LAB
ANION GAP SERPL CALC-SCNC: 10 MMOL/L (ref 10–20)
BUN SERPL-MCNC: 22 MG/DL (ref 6–23)
CALCIUM SERPL-MCNC: 8.9 MG/DL (ref 8.6–10.3)
CHLORIDE SERPL-SCNC: 107 MMOL/L (ref 98–107)
CO2 SERPL-SCNC: 25 MMOL/L (ref 21–32)
CREAT SERPL-MCNC: 1.29 MG/DL (ref 0.5–1.05)
EGFRCR SERPLBLD CKD-EPI 2021: 38 ML/MIN/1.73M*2
ERYTHROCYTE [DISTWIDTH] IN BLOOD BY AUTOMATED COUNT: 18.5 % (ref 11.5–14.5)
GLUCOSE SERPL-MCNC: 88 MG/DL (ref 74–99)
HCT VFR BLD AUTO: 30.8 % (ref 36–46)
HGB BLD-MCNC: 9.6 G/DL (ref 12–16)
MCH RBC QN AUTO: 30.7 PG (ref 26–34)
MCHC RBC AUTO-ENTMCNC: 31.2 G/DL (ref 32–36)
MCV RBC AUTO: 98 FL (ref 80–100)
NRBC BLD-RTO: 0.3 /100 WBCS (ref 0–0)
PLATELET # BLD AUTO: 260 X10*3/UL (ref 150–450)
POTASSIUM SERPL-SCNC: 4 MMOL/L (ref 3.5–5.3)
RBC # BLD AUTO: 3.13 X10*6/UL (ref 4–5.2)
SODIUM SERPL-SCNC: 138 MMOL/L (ref 136–145)
WBC # BLD AUTO: 6 X10*3/UL (ref 4.4–11.3)

## 2025-04-08 PROCEDURE — 85027 COMPLETE CBC AUTOMATED: CPT | Performed by: FAMILY MEDICINE

## 2025-04-08 PROCEDURE — 2500000004 HC RX 250 GENERAL PHARMACY W/ HCPCS (ALT 636 FOR OP/ED)

## 2025-04-08 PROCEDURE — 2500000001 HC RX 250 WO HCPCS SELF ADMINISTERED DRUGS (ALT 637 FOR MEDICARE OP): Performed by: EMERGENCY MEDICINE

## 2025-04-08 PROCEDURE — 2500000001 HC RX 250 WO HCPCS SELF ADMINISTERED DRUGS (ALT 637 FOR MEDICARE OP)

## 2025-04-08 PROCEDURE — 80048 BASIC METABOLIC PNL TOTAL CA: CPT | Performed by: FAMILY MEDICINE

## 2025-04-08 PROCEDURE — 96372 THER/PROPH/DIAG INJ SC/IM: CPT

## 2025-04-08 PROCEDURE — 97116 GAIT TRAINING THERAPY: CPT | Mod: GP,CQ

## 2025-04-08 PROCEDURE — G0378 HOSPITAL OBSERVATION PER HR: HCPCS

## 2025-04-08 PROCEDURE — 99232 SBSQ HOSP IP/OBS MODERATE 35: CPT | Performed by: FAMILY MEDICINE

## 2025-04-08 PROCEDURE — 36415 COLL VENOUS BLD VENIPUNCTURE: CPT | Performed by: FAMILY MEDICINE

## 2025-04-08 RX ADMIN — ATORVASTATIN CALCIUM 20 MG: 20 TABLET, FILM COATED ORAL at 20:24

## 2025-04-08 RX ADMIN — LISINOPRIL 10 MG: 10 TABLET ORAL at 09:35

## 2025-04-08 RX ADMIN — ENOXAPARIN SODIUM 30 MG: 30 INJECTION SUBCUTANEOUS at 17:12

## 2025-04-08 RX ADMIN — ASPIRIN 81 MG: 81 TABLET, COATED ORAL at 09:35

## 2025-04-08 ASSESSMENT — COGNITIVE AND FUNCTIONAL STATUS - GENERAL
STANDING UP FROM CHAIR USING ARMS: A LITTLE
MOBILITY SCORE: 15
STANDING UP FROM CHAIR USING ARMS: A LITTLE
EATING MEALS: A LITTLE
WALKING IN HOSPITAL ROOM: A LOT
TOILETING: A LITTLE
MOBILITY SCORE: 20
PERSONAL GROOMING: A LITTLE
DRESSING REGULAR UPPER BODY CLOTHING: A LITTLE
HELP NEEDED FOR BATHING: A LITTLE
CLIMB 3 TO 5 STEPS WITH RAILING: A LITTLE
MOBILITY SCORE: 20
DRESSING REGULAR UPPER BODY CLOTHING: A LITTLE
HELP NEEDED FOR BATHING: A LITTLE
CLIMB 3 TO 5 STEPS WITH RAILING: A LITTLE
STANDING UP FROM CHAIR USING ARMS: A LITTLE
EATING MEALS: A LITTLE
CLIMB 3 TO 5 STEPS WITH RAILING: TOTAL
DRESSING REGULAR LOWER BODY CLOTHING: A LITTLE
DRESSING REGULAR LOWER BODY CLOTHING: A LITTLE
WALKING IN HOSPITAL ROOM: A LITTLE
PERSONAL GROOMING: A LITTLE
MOVING FROM LYING ON BACK TO SITTING ON SIDE OF FLAT BED WITH BEDRAILS: A LITTLE
MOVING TO AND FROM BED TO CHAIR: A LITTLE
DAILY ACTIVITIY SCORE: 18
DAILY ACTIVITIY SCORE: 18
TOILETING: A LITTLE
WALKING IN HOSPITAL ROOM: A LITTLE
TURNING FROM BACK TO SIDE WHILE IN FLAT BAD: A LITTLE

## 2025-04-08 ASSESSMENT — PAIN - FUNCTIONAL ASSESSMENT: PAIN_FUNCTIONAL_ASSESSMENT: 0-10

## 2025-04-08 ASSESSMENT — PAIN SCALES - GENERAL
PAINLEVEL_OUTOF10: 0 - NO PAIN

## 2025-04-08 NOTE — CARE PLAN
The patient's goals for the shift include  safety    The clinical goals for the shift include Patient will remain free from falls/injuries throughout shift    Over the shift, the patient did make progress toward the following goals.

## 2025-04-08 NOTE — PROGRESS NOTES
Physical Therapy    Physical Therapy Treatment    Patient Name: Sylvia Moody  MRN: [id number]  Department: [facility] 2 E OBS  Room: [facility]2311-A  Today's Date: 4/8/2025  Time Calculation  Start Time: 1012  Stop Time: 1026  Time Calculation (min): 14 min         Assessment/Plan   PT Assessment  PT Assessment Results: Decreased strength, Decreased endurance, Impaired balance  Rehab Prognosis: Good  Barriers to Discharge Home: Caregiver assistance, Cognition needs, Physical needs  End of Session Communication: Bedside nurse  Assessment Comment: during amb pt requried min assist with FWW navigation and to  keep FWW close to body. repeated cues at times to follow directions. cues on hand palcement with transfers and safety to stay with FWw when going ot sit. cues to perform exercises correctly.  End of Session Patient Position: Up in chair, Alarm on     PT Plan  Treatment/Interventions: Transfer training, Gait training, Stair training, Balance training, Neuromuscular re-education, Strengthening, Endurance training, Therapeutic exercise, Therapeutic activity, Postural re-education, Positioning, Bed mobility  PT Plan: Ongoing PT  PT Frequency: 3 times per week  PT Discharge Recommendations: Moderate intensity level of continued care      General Visit Information:   PT  Visit  PT Received On: 04/08/25       Subjective   Precautions:        Date/Time Vitals Session Patient Position Pulse Resp SpO2 BP MAP (mmHg)    04/08/25 1307 --  --  60  16  98 %  145/80  101                 Objective   Pain:  Pain Assessment  0-10 (Numeric) Pain Score: 0 - No pain  Cognition:  Cognition  Orientation Level: Disoriented to time, Disoriented to situation, Disoriented to place  Coordination:          Treatments:  Therapeutic Exercise  Therapeutic Exercise Performed:  (LAQ, hip flexion, ankle pumps, adduction 5 x 2 ea)         Ambulation/Gait Training 1  Surface 1: Level tile  Device 1: Rolling walker  Assistance 1: Minimum assistance  Quality of  Gait 1: Decreased step length, Diminished heel strike  Comments/Distance (ft) 1: 35  Transfer 1  Technique 1: Sit to stand, Stand to sit  Transfer Device 1: Walker  Transfer Level of Assistance 1: Minimum assistance    Outcome Measures:  [facility] Basic Mobility  Turning from your back to your side while in a flat bed without using bedrails: A little  Moving from lying on your back to sitting on the side of a flat bed without using bedrails: A little  Moving to and from bed to chair (including a wheelchair): A little  Standing up from a chair using your arms (e.g. wheelchair or bedside chair): A little  To walk in hospital room: A lot  Climbing 3-5 steps with railing: Total  Basic Mobility - Total Score: 15    Education Documentation  Mobility Training, taught by Sera Rodriguez PTA at 4/8/2025  1:41 PM.  Learner: Patient  Readiness: Acceptance  Method: Explanation  Response: Verbalizes Understanding    Education Comments  No comments found.        OP EDUCATION:       Encounter Problems       Encounter Problems (Active)       Balance       STG - Maintains static standing balance without upper extremity support and Supervision (Progressing)       Start:  04/05/25    Expected End:  04/19/25       INTERVENTIONS:1. Practice standing with minimal support.2. Educate patient about maintaining total hip precautions while maintaining balance.3. Educate patient about standing tolerance.4. Educate patient about independence with gait, transfers, and ADL's.5. Educate patient about use of assistive device.6. Educate patient about self-directed care.            Mobility       STG - Patient will ambulate 50ft with FWW safely with Supervision (Progressing)       Start:  04/05/25    Expected End:  04/19/25               PT Transfers       STG - Patient will transfer sit to and from stand using fWW with safe technique and Supervision (Progressing)       Start:  04/05/25    Expected End:  04/19/25               Pain - Adult

## 2025-04-08 NOTE — PROGRESS NOTES
"SUBJECTIVE     Pt requiring sitter due to impulsivity  Has been having visual hallucinations    4/7:              Today the patient is found awake alert and interactive appropriately sitting in the bedside chair.  Complains only of feeling tired and weak.  No acute events overnight.  Her Coreg was held yesterday due to bradycardia.  Continues today with heart rate ranging from 54-79.  Asymptomatic with this.  Blood pressure stable.  Otherwise denies interval new symptoms or complaints including chest pain palpitations pleuritic type pain unusual shortness of breath cough sputum nausea abdominal pain flank pain fever or chills.    4/8:              Today the patient remains awake alert and interactive appropriately in bed.  No acute events overnight.  Voices no specific complaints.  Heart rate has remained mildly bradycardic and asymptomatic.  Coreg continues to be held.  Not requiring a sitter.  At this time awaiting authorization for SNF. Otherwise denies interval new symptoms or complaints including chest pain palpitations pleuritic type pain unusual shortness of breath cough sputum nausea abdominal pain flank pain fever or chills    OBJECTIVE:       Physical Exam  /80 (BP Location: Right arm, Patient Position: Sitting)   Pulse 60   Temp 36.7 °C (98 °F) (Temporal)   Resp 16   Ht 1.651 m (5' 5\")   Wt 74.8 kg (164 lb 14.5 oz)   SpO2 98%   BMI 27.44 kg/m²   Body mass index is 27.44 kg/m².    GEN - NAD, slender elderly  female awake alert and interactive appropriately  PULM - CTA  CVS - RRR, mildly bradycardic, 2/6 murmur  ABD - soft and nontender  EXTR - trace bilat LE edema  Psych: Pleasant and cooperative to exam    Scheduled medications  aspirin, 81 mg, oral, Daily  atorvastatin, 20 mg, oral, Nightly  [Held by provider] carvedilol, 3.125 mg, oral, BID  enoxaparin, 30 mg, subcutaneous, q24h  lisinopril, 10 mg, oral, Daily  pantoprazole, 40 mg, oral, Daily before breakfast   Or  pantoprazole, " "40 mg, intravenous, Daily before breakfast      Continuous medications     PRN medications  PRN medications: acetaminophen, guaiFENesin, melatonin, ondansetron, polyethylene glycol    Labs  Results from last 7 days   Lab Units 04/08/25 0425 04/06/25  0529 04/04/25  2114   WBC AUTO x10*3/uL 6.0 6.0 6.5   HEMOGLOBIN g/dL 9.6* 10.1* 10.4*   HEMATOCRIT % 30.8* 31.4* 32.3*   PLATELETS AUTO x10*3/uL 260 293 270     Results from last 7 days   Lab Units 04/08/25 0425 04/06/25  0529 04/04/25  2114   SODIUM mmol/L 138 140 136   POTASSIUM mmol/L 4.0 4.1 4.7   CHLORIDE mmol/L 107 104 109*   CO2 mmol/L 25 23 20*   BUN mg/dL 22 17 23   CREATININE mg/dL 1.29* 1.09* 1.13*   CALCIUM mg/dL 8.9 9.7 9.5   PROTEIN TOTAL g/dL  --  6.3* 6.3*   BILIRUBIN TOTAL mg/dL  --  0.9 0.5   ALK PHOS U/L  --  55 66   ALT U/L  --  6* 7   AST U/L  --  14 17   GLUCOSE mg/dL 88 83 94       Microbiology:   No results found for the last 90 days.                   No lab exists for component: \"AGALPCRNB\"   .ID  Lab Results   Component Value Date    URINECULTURE  04/05/2025     Growth indicates contamination with periurethral roly. Repeat culture if clinically indicated.         ASSESSMENT & PLAN:     1)  Fall/Debility  -  no fractures or acute injuries found during workup  - PT eval pending and will likely need SNF.  [facility] even if not [facility] for SNF it sounds like she will need placement due to presumed dementia and unsafe living independently  - sounds like family has 24/7 caregivers and has cameras in the home for safety  - no other acute metabolic or infectious etiologies found  4/7: AM-PAC 15.  Working towards SNF.    2)  Possible UTI  -  empiric Keflex started but culture suggests contamination >> will stop abx    3) CAD/HTN  -  resumed home meds  - has been bradycardic and will hold coreg  4/7: Continues today with mild asymptomatic bradycardia.  Continue to monitor.        Disposition  SNF           "

## 2025-04-08 NOTE — PROGRESS NOTES
Occupational Therapy                 Therapy Communication Note    Patient Name: Sylvia Moody  MRN: [id number]  Department: [facility] 2 E OBS  Room: 2311/2311-A  Today's Date: 4/8/2025     Discipline: Occupational Therapy    OT Missed Visit: Yes     Missed Visit Reason: Missed Visit Reason: Patient refused (pt. reports too tired)    Missed Time: Attempt    Comment:

## 2025-04-08 NOTE — CARE PLAN
Problem: Pain - Adult  Goal: Verbalizes/displays adequate comfort level or baseline comfort level  Outcome: Progressing     Problem: Safety - Adult  Goal: Free from fall injury  Outcome: Progressing     Problem: Discharge Planning  Goal: Discharge to home or other facility with appropriate resources  Outcome: Progressing   The patient's goals for the shift include      The clinical goals for the shift include Patient will remain free from falls/injuries throughout shift    Over the shift, the patient did not make progress toward the following goals. Barriers to progression include cognitive abilities. Recommendations to address these barriers include reorientation and education.

## 2025-04-09 LAB
ANION GAP SERPL CALC-SCNC: 10 MMOL/L (ref 10–20)
BUN SERPL-MCNC: 24 MG/DL (ref 6–23)
CALCIUM SERPL-MCNC: 9.7 MG/DL (ref 8.6–10.3)
CHLORIDE SERPL-SCNC: 106 MMOL/L (ref 98–107)
CO2 SERPL-SCNC: 25 MMOL/L (ref 21–32)
CREAT SERPL-MCNC: 1.19 MG/DL (ref 0.5–1.05)
EGFRCR SERPLBLD CKD-EPI 2021: 42 ML/MIN/1.73M*2
GLUCOSE SERPL-MCNC: 80 MG/DL (ref 74–99)
POTASSIUM SERPL-SCNC: 4.3 MMOL/L (ref 3.5–5.3)
SODIUM SERPL-SCNC: 137 MMOL/L (ref 136–145)

## 2025-04-09 PROCEDURE — 99232 SBSQ HOSP IP/OBS MODERATE 35: CPT | Performed by: FAMILY MEDICINE

## 2025-04-09 PROCEDURE — 2500000001 HC RX 250 WO HCPCS SELF ADMINISTERED DRUGS (ALT 637 FOR MEDICARE OP): Performed by: EMERGENCY MEDICINE

## 2025-04-09 PROCEDURE — G0378 HOSPITAL OBSERVATION PER HR: HCPCS

## 2025-04-09 PROCEDURE — 2500000004 HC RX 250 GENERAL PHARMACY W/ HCPCS (ALT 636 FOR OP/ED)

## 2025-04-09 PROCEDURE — 36415 COLL VENOUS BLD VENIPUNCTURE: CPT | Performed by: FAMILY MEDICINE

## 2025-04-09 PROCEDURE — 2500000005 HC RX 250 GENERAL PHARMACY W/O HCPCS

## 2025-04-09 PROCEDURE — 97535 SELF CARE MNGMENT TRAINING: CPT | Mod: GO,CO

## 2025-04-09 PROCEDURE — 2500000001 HC RX 250 WO HCPCS SELF ADMINISTERED DRUGS (ALT 637 FOR MEDICARE OP)

## 2025-04-09 PROCEDURE — 96372 THER/PROPH/DIAG INJ SC/IM: CPT

## 2025-04-09 PROCEDURE — 80048 BASIC METABOLIC PNL TOTAL CA: CPT | Performed by: FAMILY MEDICINE

## 2025-04-09 RX ADMIN — PANTOPRAZOLE SODIUM 40 MG: 40 TABLET, DELAYED RELEASE ORAL at 06:33

## 2025-04-09 RX ADMIN — LISINOPRIL 10 MG: 10 TABLET ORAL at 09:33

## 2025-04-09 RX ADMIN — ATORVASTATIN CALCIUM 20 MG: 20 TABLET, FILM COATED ORAL at 20:04

## 2025-04-09 RX ADMIN — ENOXAPARIN SODIUM 30 MG: 30 INJECTION SUBCUTANEOUS at 16:50

## 2025-04-09 RX ADMIN — ASPIRIN 81 MG: 81 TABLET, COATED ORAL at 09:33

## 2025-04-09 RX ADMIN — ACETAMINOPHEN 650 MG: 325 TABLET ORAL at 20:34

## 2025-04-09 RX ADMIN — Medication 3 MG: at 20:34

## 2025-04-09 ASSESSMENT — COGNITIVE AND FUNCTIONAL STATUS - GENERAL
TOILETING: A LITTLE
HELP NEEDED FOR BATHING: A LITTLE
STANDING UP FROM CHAIR USING ARMS: A LITTLE
MOBILITY SCORE: 20
DRESSING REGULAR UPPER BODY CLOTHING: A LITTLE
WALKING IN HOSPITAL ROOM: A LITTLE
WALKING IN HOSPITAL ROOM: A LITTLE
STANDING UP FROM CHAIR USING ARMS: A LITTLE
MOBILITY SCORE: 20
TOILETING: A LITTLE
DAILY ACTIVITIY SCORE: 18
MOVING TO AND FROM BED TO CHAIR: A LITTLE
HELP NEEDED FOR BATHING: A LITTLE
DRESSING REGULAR LOWER BODY CLOTHING: A LOT
DRESSING REGULAR UPPER BODY CLOTHING: A LITTLE
DRESSING REGULAR UPPER BODY CLOTHING: A LITTLE
DRESSING REGULAR LOWER BODY CLOTHING: A LITTLE
CLIMB 3 TO 5 STEPS WITH RAILING: A LITTLE
PERSONAL GROOMING: A LITTLE
DRESSING REGULAR LOWER BODY CLOTHING: A LITTLE
DAILY ACTIVITIY SCORE: 18
EATING MEALS: A LITTLE
DAILY ACTIVITIY SCORE: 16
TOILETING: A LOT
HELP NEEDED FOR BATHING: A LOT
EATING MEALS: A LITTLE
PERSONAL GROOMING: A LITTLE
MOVING TO AND FROM BED TO CHAIR: A LITTLE
PERSONAL GROOMING: A LITTLE
CLIMB 3 TO 5 STEPS WITH RAILING: A LITTLE

## 2025-04-09 ASSESSMENT — ACTIVITIES OF DAILY LIVING (ADL): HOME_MANAGEMENT_TIME_ENTRY: 16

## 2025-04-09 ASSESSMENT — PAIN SCALES - GENERAL
PAINLEVEL_OUTOF10: 3
PAINLEVEL_OUTOF10: 0 - NO PAIN

## 2025-04-09 ASSESSMENT — PAIN DESCRIPTION - LOCATION: LOCATION: GENERALIZED

## 2025-04-09 ASSESSMENT — PAIN - FUNCTIONAL ASSESSMENT
PAIN_FUNCTIONAL_ASSESSMENT: 0-10
PAIN_FUNCTIONAL_ASSESSMENT: 0-10

## 2025-04-09 NOTE — PROGRESS NOTES
"Music Therapy Note    Sylvia Moody was referred by     Therapy Session  Visit Type: New visit  Session Start Time: 1500  Session End Time: 1530  Intervention Delivery: In-person  Conflict of Service: None  Number of staff members present: 1     Pre-assessment  Unable to Assess Reason: Outcomes not assessed         Treatment/Interventions  Music Therapy Interventions: Assessment, Live music listening  Interruption: No  Patient Fell Asleep at End of Session: No    Post-assessment  Total Session Time (min): 30 minutes    Narrative  Assessment Detail: Pt. was present for music therapy session with room-mate. MT attempted to engage pt. in singing and participation of live music  Plan: MT presented live music through guitar and singing to offer support  Intervention: Pt. was sitting on bed next to room-mate who was being provided music therapy session. MT played guitar and sang, \"Let it Be, Hey Morales, Lean on Me, and I Think They Call This Love\"  Evaluation: Pt. was distracted by meal and thoughts of going home. Stood up often from bed causing alarm and nurse to respond. Mt engaged pt. in some conversation and attempted participation during songs.  Follow-up: MT will check in on pt. as able    Education Documentation  Resources, taught by Zahra Clayton at 4/9/2025  5:35 PM.  Learner: Patient  Readiness: Acceptance  Method: Demonstration  Response: Needs Reinforcement    Relaxation, taught by Zahra Clayton at 4/9/2025  5:35 PM.  Learner: Patient  Readiness: Acceptance  Method: Demonstration  Response: Needs Reinforcement            "

## 2025-04-09 NOTE — CARE PLAN
The patient's goals for the shift include      The clinical goals for the shift include Pt will remain free from falls/injuries throughout shift      Problem: Pain - Adult  Goal: Verbalizes/displays adequate comfort level or baseline comfort level  Outcome: Progressing     Problem: Safety - Adult  Goal: Free from fall injury  Outcome: Progressing     Problem: Discharge Planning  Goal: Discharge to home or other facility with appropriate resources  Outcome: Progressing     Problem: Chronic Conditions and Co-morbidities  Goal: Patient's chronic conditions and co-morbidity symptoms are monitored and maintained or improved  Outcome: Progressing     Problem: Nutrition  Goal: Nutrient intake appropriate for maintaining nutritional needs  Outcome: Progressing

## 2025-04-09 NOTE — CARE PLAN
The patient's goals for the shift include  safety    The clinical goals for the shift include Pt will remain free from falls/injuries throughout shift    Over the shift, the patient did make progress toward the following goals.

## 2025-04-09 NOTE — PROGRESS NOTES
1502 Requested  Auth team escalate auth.    1545 Qing at [facility] is requesting a copy of the patients insurance card. None in chart. Spoke to patients daughter and asked if she had the card, she states she does not know where it is. Per  Auth team auth was not started by Ave at [facility] yet.     1620 Daughter provided copy of card, sent to e at [facility].

## 2025-04-09 NOTE — PROGRESS NOTES
Physical Therapy                 Therapy Communication Note    Patient Name: Sylvia Moody  MRN: [id number]  Department: [facility] 2 E OBS  Room: 2311/2311-B  Today's Date: 4/9/2025     Discipline: Physical Therapy    PT Missed Visit: Yes     Missed Visit Reason: Missed Visit Reason: Patient refused (states she just worked and got back into bed is now tired)    Missed Time: Attempt    Comment:

## 2025-04-09 NOTE — PROGRESS NOTES
Occupational Therapy    OT Treatment    Patient Name: Sylvia Moody  MRN: [id number]  Department: [facility] 2 E OBS  Room: 2311/2311-B  Today's Date: 4/9/2025  Time Calculation  Start Time: 1031  Stop Time: 1047  Time Calculation (min): 16 min        Assessment:  OT Assessment: Patient completed transfers and functional mobility with Min assist and walker. Cues throughout. Patient asked to rest following toileting routine.  End of Session Communication: PCT/NA/CTA  End of Session Patient Position: Bed, 3 rail up, Alarm on     Plan:  Treatment Interventions: ADL retraining, Functional transfer training, UE strengthening/ROM, Endurance training, Patient/family training, Cognitive reorientation, Neuromuscular reeducation, Equipment evaluation/education  OT Frequency: 3 times per week  OT Discharge Recommendations: Moderate intensity level of continued care  Equipment Recommended upon Discharge: Other (comment) (TBD)  OT - OK to Discharge: Yes  Treatment Interventions: ADL retraining, Functional transfer training, UE strengthening/ROM, Endurance training, Patient/family training, Cognitive reorientation, Neuromuscular reeducation, Equipment evaluation/education    Subjective   Previous Visit Info:  OT Last Visit  OT Received On: 04/09/25  General:  General  Prior to Session Communication: Bedside nurse  Patient Position Received: Bed, 3 rail up, Alarm on  Precautions:  Medical Precautions: Fall precautions      Pain:  Pain Assessment  0-10 (Numeric) Pain Score: 0 - No pain    Objective    Cognition:  Cognition  Orientation Level: Disoriented to time, Disoriented to situation     Activities of Daily Living: Grooming  Grooming Level of Assistance: Contact guard  Grooming Where Assessed: Standing sinkside    Toileting  Toileting Level of Assistance: Minimum assistance  Where Assessed: Toilet     Bed Mobility/Transfers: Bed Mobility  Bed Mobility: Yes  Bed Mobility 1  Bed Mobility 1: Supine to sitting, Sitting to supine  Level of  Assistance 1: Close supervision    Transfers  Transfer: Yes  Transfer 1  Technique 1: Sit to stand, Stand to sit  Transfer Device 1: Walker  Transfer Level of Assistance 1: Minimum assistance, Moderate verbal cues    Toilet Transfers  Toilet Transfer From: Bed  Toilet Transfer Type: To and from  Toilet Transfer to: Standard toilet  Toilet Transfer Technique: Ambulating  Toilet Transfers: Minimal assistance, Verbal cues  Toilet Transfers Comments: with wheeled walker and gait belt for safety.    Outcome Measures:[facility] Daily Activity  Putting on and taking off regular lower body clothing: A lot  Bathing (including washing, rinsing, drying): A lot  Putting on and taking off regular upper body clothing: A little  Toileting, which includes using toilet, bedpan or urinal: A lot  Taking care of personal grooming such as brushing teeth: A little  Eating Meals: None  Daily Activity - Total Score: 16    Education Documentation  ADL Training, taught by LYLE Lima at 4/9/2025 11:19 AM.  Learner: Patient  Readiness: Acceptance  Method: Explanation  Response: Needs Reinforcement    Education Comments  No comments found.           Goals:  Encounter Problems       Encounter Problems (Active)       ADLs       Patient will perform UB and LB bathing with minimal assist  level of assistance and PRN DME/AE. (Progressing)       Start:  04/05/25    Expected End:  04/18/25            Patient with complete upper body dressing with set-up level of assistance donning and doffing all UE clothes with PRN adaptive equipment  (Progressing)       Start:  04/05/25    Expected End:  04/18/25            Patient with complete lower body dressing with supervision level of assistance donning and doffing all LE clothes  with PRN adaptive equipment  (Progressing)       Start:  04/05/25    Expected End:  04/18/25            Patient will feed self with modified independent level of assistance and  using PRN adaptive equipment. (Progressing)        Start:  04/05/25    Expected End:  04/18/25            Patient will complete daily grooming tasks brushing teeth and washing face/hair with set-up level of assistance and PRN adaptive equipment . (Progressing)       Start:  04/05/25    Expected End:  04/18/25            Patient will complete toileting including hygiene clothing management/hygiene with stand by assist level of assistance. (Progressing)       Start:  04/05/25    Expected End:  04/18/25

## 2025-04-09 NOTE — PROGRESS NOTES
"SUBJECTIVE     Pt requiring sitter due to impulsivity  Has been having visual hallucinations    4/7:              Today the patient is found awake alert and interactive appropriately sitting in the bedside chair.  Complains only of feeling tired and weak.  No acute events overnight.  Her Coreg was held yesterday due to bradycardia.  Continues today with heart rate ranging from 54-79.  Asymptomatic with this.  Blood pressure stable.  Otherwise denies interval new symptoms or complaints including chest pain palpitations pleuritic type pain unusual shortness of breath cough sputum nausea abdominal pain flank pain fever or chills.    4/8:              Today the patient remains awake alert and interactive appropriately in bed.  No acute events overnight.  Voices no specific complaints.  Heart rate has remained mildly bradycardic and asymptomatic.  Coreg continues to be held.  Not requiring a sitter.  At this time awaiting authorization for SNF. Otherwise denies interval new symptoms or complaints including chest pain palpitations pleuritic type pain unusual shortness of breath cough sputum nausea abdominal pain flank pain fever or chills    4/9:               Today the patient once again is awake alert and interactive appropriately in bed.  Specific complaints and no acute events overnight.  Heart rate appears improved in the normal range today.  Blood pressures trending up and may need to resume Coreg.  Continuing to await authorization for SNF. Otherwise denies interval new symptoms or complaints including chest pain palpitations pleuritic type pain unusual shortness of breath cough sputum nausea abdominal pain flank pain fever or chills    OBJECTIVE:       Physical Exam  /67 (BP Location: Right arm, Patient Position: Lying)   Pulse 80   Temp 36.5 °C (97.7 °F) (Temporal)   Resp 16   Ht 1.651 m (5' 5\")   Wt 74.8 kg (164 lb 14.5 oz)   SpO2 94%   BMI 27.44 kg/m²   Body mass index is 27.44 kg/m².    GEN - NAD, " "slender elderly  female awake alert and interactive appropriately  PULM - CTA  CVS - RRR, normal rate, 2/6 murmur  ABD - soft and nontender  EXTR - trace bilat LE edema  Psych: Pleasant and cooperative to exam    Scheduled medications  aspirin, 81 mg, oral, Daily  atorvastatin, 20 mg, oral, Nightly  [Held by provider] carvedilol, 3.125 mg, oral, BID  enoxaparin, 30 mg, subcutaneous, q24h  lisinopril, 10 mg, oral, Daily  pantoprazole, 40 mg, oral, Daily before breakfast   Or  pantoprazole, 40 mg, intravenous, Daily before breakfast      Continuous medications     PRN medications  PRN medications: acetaminophen, guaiFENesin, melatonin, ondansetron, polyethylene glycol    Labs  Results from last 7 days   Lab Units 04/08/25  0425 04/06/25  0529 04/04/25  2114   WBC AUTO x10*3/uL 6.0 6.0 6.5   HEMOGLOBIN g/dL 9.6* 10.1* 10.4*   HEMATOCRIT % 30.8* 31.4* 32.3*   PLATELETS AUTO x10*3/uL 260 293 270     Results from last 7 days   Lab Units 04/09/25  0449 04/08/25  0425 04/06/25  0529 04/04/25  2114   SODIUM mmol/L 137 138 140 136   POTASSIUM mmol/L 4.3 4.0 4.1 4.7   CHLORIDE mmol/L 106 107 104 109*   CO2 mmol/L 25 25 23 20*   BUN mg/dL 24* 22 17 23   CREATININE mg/dL 1.19* 1.29* 1.09* 1.13*   CALCIUM mg/dL 9.7 8.9 9.7 9.5   PROTEIN TOTAL g/dL  --   --  6.3* 6.3*   BILIRUBIN TOTAL mg/dL  --   --  0.9 0.5   ALK PHOS U/L  --   --  55 66   ALT U/L  --   --  6* 7   AST U/L  --   --  14 17   GLUCOSE mg/dL 80 88 83 94       Microbiology:   No results found for the last 90 days.                   No lab exists for component: \"AGALPCRNB\"   .ID  Lab Results   Component Value Date    URINECULTURE  04/05/2025     Growth indicates contamination with periurethral roly. Repeat culture if clinically indicated.         ASSESSMENT & PLAN:     1)  Fall/Debility  -  no fractures or acute injuries found during workup  - PT eval pending and will likely need SNF.  PT [facility] is 16 - even if not qualifying for SNF it sounds like she will " need placement due to presumed dementia and unsafe living independently  - sounds like family has 24/7 caregivers and has cameras in the home for safety  - no other acute metabolic or infectious etiologies found  4/7: AM-PAC 15.  Working towards SNF.    2)  Possible UTI  -  empiric Keflex started but culture suggests contamination >> will stop abx    3) CAD/HTN/bradycardia  -  resumed home meds  - has been bradycardic and will hold coreg  4/7: Continues today with mild asymptomatic bradycardia.    4/9: Heart rates primarily good range and normal today.  Blood pressure perhaps starting to trend up and may need to resume Coreg.  Continue to monitor.        Disposition  SNF authorization pending

## 2025-04-10 PROCEDURE — G0378 HOSPITAL OBSERVATION PER HR: HCPCS

## 2025-04-10 PROCEDURE — 2500000001 HC RX 250 WO HCPCS SELF ADMINISTERED DRUGS (ALT 637 FOR MEDICARE OP): Performed by: EMERGENCY MEDICINE

## 2025-04-10 PROCEDURE — 2500000005 HC RX 250 GENERAL PHARMACY W/O HCPCS

## 2025-04-10 PROCEDURE — 97110 THERAPEUTIC EXERCISES: CPT | Mod: GP,CQ | Performed by: PHYSICAL THERAPY ASSISTANT

## 2025-04-10 PROCEDURE — 97535 SELF CARE MNGMENT TRAINING: CPT | Mod: GO,CO

## 2025-04-10 PROCEDURE — 97530 THERAPEUTIC ACTIVITIES: CPT | Mod: GP,CQ | Performed by: PHYSICAL THERAPY ASSISTANT

## 2025-04-10 PROCEDURE — 2500000001 HC RX 250 WO HCPCS SELF ADMINISTERED DRUGS (ALT 637 FOR MEDICARE OP)

## 2025-04-10 RX ADMIN — ACETAMINOPHEN 650 MG: 325 TABLET ORAL at 19:58

## 2025-04-10 RX ADMIN — LISINOPRIL 10 MG: 10 TABLET ORAL at 08:36

## 2025-04-10 RX ADMIN — Medication 3 MG: at 19:57

## 2025-04-10 RX ADMIN — ATORVASTATIN CALCIUM 20 MG: 20 TABLET, FILM COATED ORAL at 19:57

## 2025-04-10 RX ADMIN — ASPIRIN 81 MG: 81 TABLET, COATED ORAL at 08:36

## 2025-04-10 RX ADMIN — PANTOPRAZOLE SODIUM 40 MG: 40 TABLET, DELAYED RELEASE ORAL at 06:19

## 2025-04-10 ASSESSMENT — PAIN - FUNCTIONAL ASSESSMENT
PAIN_FUNCTIONAL_ASSESSMENT: 0-10
PAIN_FUNCTIONAL_ASSESSMENT: 0-10

## 2025-04-10 ASSESSMENT — PAIN DESCRIPTION - LOCATION: LOCATION: GENERALIZED

## 2025-04-10 ASSESSMENT — COGNITIVE AND FUNCTIONAL STATUS - GENERAL
DRESSING REGULAR UPPER BODY CLOTHING: A LITTLE
CLIMB 3 TO 5 STEPS WITH RAILING: A LITTLE
PERSONAL GROOMING: A LITTLE
WALKING IN HOSPITAL ROOM: A LOT
WALKING IN HOSPITAL ROOM: A LITTLE
PERSONAL GROOMING: A LITTLE
MOVING TO AND FROM BED TO CHAIR: A LITTLE
DRESSING REGULAR LOWER BODY CLOTHING: A LOT
TOILETING: A LITTLE
EATING MEALS: A LITTLE
MOVING TO AND FROM BED TO CHAIR: A LITTLE
MOBILITY SCORE: 15
TOILETING: A LOT
MOVING FROM LYING ON BACK TO SITTING ON SIDE OF FLAT BED WITH BEDRAILS: A LITTLE
DAILY ACTIVITIY SCORE: 16
CLIMB 3 TO 5 STEPS WITH RAILING: TOTAL
STANDING UP FROM CHAIR USING ARMS: A LITTLE
HELP NEEDED FOR BATHING: A LITTLE
DRESSING REGULAR UPPER BODY CLOTHING: A LITTLE
DRESSING REGULAR LOWER BODY CLOTHING: A LITTLE
MOBILITY SCORE: 20
HELP NEEDED FOR BATHING: A LOT
DAILY ACTIVITIY SCORE: 18
STANDING UP FROM CHAIR USING ARMS: A LITTLE
TURNING FROM BACK TO SIDE WHILE IN FLAT BAD: A LITTLE

## 2025-04-10 ASSESSMENT — PAIN SCALES - GENERAL
PAINLEVEL_OUTOF10: 0 - NO PAIN
PAINLEVEL_OUTOF10: 2
PAINLEVEL_OUTOF10: 0 - NO PAIN
PAINLEVEL_OUTOF10: 0 - NO PAIN

## 2025-04-10 ASSESSMENT — ACTIVITIES OF DAILY LIVING (ADL): HOME_MANAGEMENT_TIME_ENTRY: 15

## 2025-04-10 NOTE — CARE PLAN
The patient's goals for the shift include      The clinical goals for the shift include Pt will be free from fall or injury throughout shift      Problem: Pain - Adult  Goal: Verbalizes/displays adequate comfort level or baseline comfort level  Outcome: Progressing     Problem: Safety - Adult  Goal: Free from fall injury  Outcome: Progressing     Problem: Discharge Planning  Goal: Discharge to home or other facility with appropriate resources  Outcome: Progressing     Problem: Chronic Conditions and Co-morbidities  Goal: Patient's chronic conditions and co-morbidity symptoms are monitored and maintained or improved  Outcome: Progressing     Problem: Nutrition  Goal: Nutrient intake appropriate for maintaining nutritional needs  Outcome: Progressing

## 2025-04-10 NOTE — PROGRESS NOTES
Notified by Qing at [facility] of auth denial. Peer to peer at  [phone] no ref# was provided.     1042 Spoke to representative with [facility] regarding reference number for [facility]. She states that the final determination for denial has been made, [facility] is no longer available. Will need to appeal denial [phone]. Updated Dr. Martínez.     8624 Updated clinicals faxed to [phone] for fast appeal of denial. Could take up to 72 hours for decision.     1404 Updated patients daughter of SNF denial, that Appeal was started and could take up to 72 hours for a decision. Explained that if denial is overturned, then she will be able to go to SNF. If denial is upheld, patient would need to private pay for SNF, or discharge home with [facility]. Answered all questions and verbalized understanding.

## 2025-04-10 NOTE — PROGRESS NOTES
Physical Therapy    Physical Therapy Treatment    Patient Name: Sylvia Moody  MRN: [id number]  Department: [facility] 2 E [facility]  Room: 2311/2311-B  Today's Date: 4/10/2025        Assessment/Plan   PT Assessment  PT Assessment Results: Decreased strength, Decreased endurance, Impaired balance  Barriers to Discharge Home: Caregiver assistance, Cognition needs, Physical needs  End of Session Communication: PCT/NA/CTA  Assessment Comment: Patient tolerated tx session with increased levels of tiredness. Patient was groggy dduring session and had a difficult time opening her eyes upon mooderate verbal cueing to do so from therapist. Patient able to comlete transfers and seated exercises this session. Patient will continue to progress towards goals and ADL's to return to OF.  End of Session Patient Position: Up in chair, Alarm on  PT Plan  Inpatient/Swing Bed or Outpatient: Inpatient  PT Plan  Treatment/Interventions: Transfer training, Gait training, Stair training, Balance training, Neuromuscular re-education, Strengthening, Endurance training, Therapeutic exercise, Therapeutic activity, Postural re-education, Positioning, Bed mobility  PT Plan: Ongoing PT  PT Frequency: 3 times per week  PT Discharge Recommendations: Moderate intensity level of continued care      General Visit Information:   PT  Visit  PT Received On: 04/10/25  Response to Previous Treatment: Patient with no complaints from previous session.  General  Reason for Referral: Impaired Mobility  Past Medical History Relevant to Rehab: CAD s/p CABG, dyslipidemia, recent fall  Family/Caregiver Present: No  Prior to Session Communication: Bedside nurse  Patient Position Received: Bed, 3 rail up, Alarm on  General Comment: Patient sleeping when I walked in and stated that she was very tired, but was agreeable to therapy session.    Precautions:  Precautions  Hearing/Visual Limitations: [place]: no hearing aids in ED  Medical Precautions: Fall precautions     Date/Time  Vitals Session Patient Position Pulse Resp SpO2 BP MAP (mmHg)    04/10/25 0900 --  --  90  17  93 %  129/69  96     04/10/25 1005 Pre PT  Sitting  --  --  94 %  165/75  --                   Pain:  Pain Assessment  Pain Assessment: 0-10  0-10 (Numeric) Pain Score: 0 - No pain  Cognition:  Cognition  Orientation Level: Disoriented to place, Disoriented to time, Disoriented to situation       Postural Control:  Static Sitting Balance  Static Sitting-Balance Support: Feet unsupported, Bilateral upper extremity supported  Static Sitting-Level of Assistance: Close supervision  Static Sitting-Comment/Number of Minutes: x2 minutes EOB  Static Standing Balance  Static Standing-Balance Support: Bilateral upper extremity supported  Static Standing-Level of Assistance: Minimum assistance, Contact guard  Static Standing-Comment/Number of Minutes: x1 minute    Activity Tolerance:  Activity Tolerance  Endurance: Tolerates less than 10 min exercise, no significant change in vital signs  Treatments:  Therapeutic Exercise  Therapeutic Exercise Performed: Yes  Therapeutic Exercise Activity 1: Patient perfromed seated AROM eric LE exercises to improve functional mobility; alternating hip flexion 2x10, hip abduction 2x10, ankle pumps x10. Patient required maximal cueing and therapist demonstration to complete each exercise d/t [place] and cognitive deficits.    Bed Mobility  Bed Mobility: Yes  Bed Mobility 1  Bed Mobility 1: Supine to sitting  Level of Assistance 1: Contact guard    Ambulation/Gait Training  Ambulation/Gait Training Performed: Yes  Ambulation/Gait Training 1  Surface 1: Level tile  Device 1: Rolling walker  Assistance 1: Minimum assistance, Maximum verbal cues  Quality of Gait 1: Decreased step length, Diminished heel strike  Comments/Distance (ft) 1: Patient was able to take x3 steps with FWW. Verbal cueing for proper hand placement with FWW to increase patient safety.  Transfers  Transfer: Yes  Transfer 1  Transfer From 1:  Bed to  Transfer to 1: Stand  Technique 1: Sit to stand  Transfer Device 1: Walker  Transfer Level of Assistance 1: Minimum assistance, Moderate verbal cues  Transfers 2  Transfer From 2: Stand to  Transfer to 2: Chair with arms  Technique 2: Stand pivot  Transfer Device 2: Walker  Transfer Level of Assistance 2: Minimum assistance, Maximum verbal cues    Outcome Measures:  [facility] Basic Mobility  Turning from your back to your side while in a flat bed without using bedrails: A little  Moving from lying on your back to sitting on the side of a flat bed without using bedrails: A little  Moving to and from bed to chair (including a wheelchair): A little  Standing up from a chair using your arms (e.g. wheelchair or bedside chair): A little  To walk in hospital room: A lot  Climbing 3-5 steps with railing: Total  Basic Mobility - Total Score: 15    Education Documentation  Mobility Training, taught by Gianna D'Amico, S-PTA at 4/10/2025 10:55 AM.  Learner: Patient  Readiness: Acceptance  Method: Explanation  Response: Verbalizes Understanding    Precautions, taught by Gianna D'Amico, S-PTA at 4/10/2025 10:55 AM.  Learner: Patient  Readiness: Acceptance  Method: Explanation  Response: Verbalizes Understanding    ADL Training, taught by Gianna D'Amico, S-PTA at 4/10/2025 10:55 AM.  Learner: Patient  Readiness: Acceptance  Method: Explanation  Response: Verbalizes Understanding    Education Comments  No comments found.         Encounter Problems       Encounter Problems (Active)       Balance       STG - Maintains static standing balance without upper extremity support and Supervision (Progressing)       Start:  04/05/25    Expected End:  04/19/25       INTERVENTIONS:1. Practice standing with minimal support.2. Educate patient about maintaining total hip precautions while maintaining balance.3. Educate patient about standing tolerance.4. Educate patient about independence with gait, transfers, and ADL's.5. Educate patient  about use of assistive device.6. Educate patient about self-directed care.            Mobility       STG - Patient will ambulate 50ft with FWW safely with Supervision (Not Progressing)       Start:  04/05/25    Expected End:  04/19/25               PT Transfers       STG - Patient will transfer sit to and from stand using fWW with safe technique and Supervision (Progressing)       Start:  04/05/25    Expected End:  04/19/25               Pain - Adult            Gianna D'Amico, S-PTA

## 2025-04-10 NOTE — PROGRESS NOTES
SUBJECTIVE     Pt requiring sitter due to impulsivity  Has been having visual hallucinations    4/7:              Today the patient is found awake alert and interactive appropriately sitting in the bedside chair.  Complains only of feeling tired and weak.  No acute events overnight.  Her Coreg was held yesterday due to bradycardia.  Continues today with heart rate ranging from 54-79.  Asymptomatic with this.  Blood pressure stable.  Otherwise denies interval new symptoms or complaints including chest pain palpitations pleuritic type pain unusual shortness of breath cough sputum nausea abdominal pain flank pain fever or chills.    4/8:              Today the patient remains awake alert and interactive appropriately in bed.  No acute events overnight.  Voices no specific complaints.  Heart rate has remained mildly bradycardic and asymptomatic.  Coreg continues to be held.  Not requiring a sitter.  At this time awaiting authorization for SNF. Otherwise denies interval new symptoms or complaints including chest pain palpitations pleuritic type pain unusual shortness of breath cough sputum nausea abdominal pain flank pain fever or chills    4/9:               Today the patient once again is awake alert and interactive appropriately in bed.  Specific complaints and no acute events overnight.  Heart rate appears improved in the normal range today.  Blood pressures trending up and may need to resume Coreg.  Continuing to await authorization for SNF. Otherwise denies interval new symptoms or complaints including chest pain palpitations pleuritic type pain unusual shortness of breath cough sputum nausea abdominal pain flank pain fever or chills    4/10:               Today the patient is found sleeping but does awaken to name and exam.  Voices no specific complaints and no acute events overnight.  Continues with good heart rate and blood pressure levels.  Notified of insurance denial for SNF and awaiting appeal which likely  "will be on the weekend.  Otherwise denies interval new symptoms or complaints including chest pain palpitations pleuritic type pain unusual shortness of breath cough sputum nausea abdominal pain flank pain fever or chills    OBJECTIVE:       Physical Exam  /51 (BP Location: Left arm, Patient Position: Sitting)   Pulse 61   Temp 35.7 °C (96.3 °F) (Temporal)   Resp 17   Ht 1.651 m (5' 5\")   Wt 74.8 kg (164 lb 14.5 oz)   SpO2 92%   BMI 27.44 kg/m²   Body mass index is 27.44 kg/m².    GEN - NAD, slender elderly  female awake alert and interactive appropriately  PULM - CTA  CVS - RRR, normal rate, 2/6 murmur  ABD - soft and nontender  EXTR - trace bilat LE edema  Psych: Pleasant and cooperative to exam    Scheduled medications  aspirin, 81 mg, oral, Daily  atorvastatin, 20 mg, oral, Nightly  [Held by provider] carvedilol, 3.125 mg, oral, BID  enoxaparin, 30 mg, subcutaneous, q24h  lisinopril, 10 mg, oral, Daily  pantoprazole, 40 mg, oral, Daily before breakfast   Or  pantoprazole, 40 mg, intravenous, Daily before breakfast      Continuous medications     PRN medications  PRN medications: acetaminophen, guaiFENesin, melatonin, ondansetron, polyethylene glycol    Labs  Results from last 7 days   Lab Units 04/08/25 0425 04/06/25  0529 04/04/25  2114   WBC AUTO x10*3/uL 6.0 6.0 6.5   HEMOGLOBIN g/dL 9.6* 10.1* 10.4*   HEMATOCRIT % 30.8* 31.4* 32.3*   PLATELETS AUTO x10*3/uL 260 293 270     Results from last 7 days   Lab Units 04/09/25  0449 04/08/25  0425 04/06/25  0529 04/04/25  2114   SODIUM mmol/L 137 138 140 136   POTASSIUM mmol/L 4.3 4.0 4.1 4.7   CHLORIDE mmol/L 106 107 104 109*   CO2 mmol/L 25 25 23 20*   BUN mg/dL 24* 22 17 23   CREATININE mg/dL 1.19* 1.29* 1.09* 1.13*   CALCIUM mg/dL 9.7 8.9 9.7 9.5   PROTEIN TOTAL g/dL  --   --  6.3* 6.3*   BILIRUBIN TOTAL mg/dL  --   --  0.9 0.5   ALK PHOS U/L  --   --  55 66   ALT U/L  --   --  6* 7   AST U/L  --   --  14 17   GLUCOSE mg/dL 80 88 83 94 " "      Microbiology:   No results found for the last 90 days.                   No lab exists for component: \"AGALPCRNB\"   .ID  Lab Results   Component Value Date    URINECULTURE  04/05/2025     Growth indicates contamination with periurethral roly. Repeat culture if clinically indicated.         ASSESSMENT & PLAN:     1)  Fall/Debility  -  no fractures or acute injuries found during workup  - PT eval pending and will likely need SNF.  PT AMPAC is 16 - even if not qualifying for SNF it sounds like she will need placement due to presumed dementia and unsafe living independently  - sounds like family has 24/7 caregivers and has cameras in the home for safety  - no other acute metabolic or infectious etiologies found  4/7: AM-PAC 15.  Working towards SNF.    2)  Possible UTI  -  empiric Keflex started but culture suggests contamination >> will stop abx    3) CAD/HTN/bradycardia  -  resumed home meds  - has been bradycardic and will hold coreg  4/7: Continues today with mild asymptomatic bradycardia.    4/9: Heart rates primarily good range and normal today.  Blood pressure perhaps starting to trend up and may need to resume Coreg.  Continue to monitor.        Disposition  SNF authorization denied now awaiting appeal          "

## 2025-04-10 NOTE — PROGRESS NOTES
Occupational Therapy    OT Treatment    Patient Name: Sylvia Moody  MRN: [id number]  Department: [facility]  Room: 2311/2311-B  Today's Date: 4/10/2025  Time Calculation  Start Time: 1608  Stop Time: 1623  Time Calculation (min): 15 min        Assessment:  End of Session Communication: PCT/LUIS/DILEEP  End of Session Patient Position: Bed, 3 rail up, Alarm on     Plan:  Treatment Interventions: ADL retraining, Functional transfer training, UE strengthening/ROM, Endurance training, Patient/family training, Cognitive reorientation, Neuromuscular reeducation, Equipment evaluation/education  OT Frequency: 3 times per week  OT Discharge Recommendations: Moderate intensity level of continued care  Equipment Recommended upon Discharge: Other (comment) (TBD)  OT - OK to Discharge: Yes  Treatment Interventions: ADL retraining, Functional transfer training, UE strengthening/ROM, Endurance training, Patient/family training, Cognitive reorientation, Neuromuscular reeducation, Equipment evaluation/education    Subjective   Previous Visit Info:  OT Last Visit  OT Received On: 04/10/25  General:  General  Prior to Session Communication: PCT/LUIS/DILEEP, Bedside nurse  Patient Position Received: Bed, 3 rail up, Alarm on  General Comment: patient asking to go to the bathroom but wanted to return to bed following toileting.  Precautions:  Medical Precautions: Fall precautions      Pain:  Pain Assessment  0-10 (Numeric) Pain Score: 0 - No pain    Objective    Cognition:  Cognition  Overall Cognitive Status: Impaired     Activities of Daily Living: Toileting  Toileting Level of Assistance: Moderate assistance  Where Assessed: Toilet     Bed Mobility/Transfers: Bed Mobility  Bed Mobility: Yes  Bed Mobility 1  Bed Mobility 1: Supine to sitting, Sitting to supine  Level of Assistance 1: Minimum assistance    Transfers  Transfer: Yes  Transfer 1  Technique 1: Sit to stand, Stand to sit  Transfer Device 1: Walker  Transfer Level of Assistance 1:  Minimum assistance    Toilet Transfers  Toilet Transfer From: Bed  Toilet Transfer Type: To and from  Toilet Transfer to: Standard toilet  Toilet Transfer Technique: Ambulating  Toilet Transfers: Minimal assistance    Outcome Measures:[facility] Daily Activity  Putting on and taking off regular lower body clothing: A lot  Bathing (including washing, rinsing, drying): A lot  Putting on and taking off regular upper body clothing: A little  Toileting, which includes using toilet, bedpan or urinal: A lot  Taking care of personal grooming such as brushing teeth: A little  Eating Meals: None  Daily Activity - Total Score: 16    Education Documentation  ADL Training, taught by LYLE Lima at 4/10/2025  5:30 PM.  Learner: Patient  Readiness: Acceptance  Method: Explanation  Response: Needs Reinforcement    Education Comments  No comments found.           Goals:  Encounter Problems       Encounter Problems (Active)       ADLs       Patient will perform UB and LB bathing with minimal assist  level of assistance and PRN DME/AE. (Progressing)       Start:  04/05/25    Expected End:  04/18/25            Patient with complete upper body dressing with set-up level of assistance donning and doffing all UE clothes with PRN adaptive equipment  (Progressing)       Start:  04/05/25    Expected End:  04/18/25            Patient with complete lower body dressing with supervision level of assistance donning and doffing all LE clothes  with PRN adaptive equipment  (Progressing)       Start:  04/05/25    Expected End:  04/18/25            Patient will feed self with modified independent level of assistance and  using PRN adaptive equipment. (Progressing)       Start:  04/05/25    Expected End:  04/18/25            Patient will complete daily grooming tasks brushing teeth and washing face/hair with set-up level of assistance and PRN adaptive equipment . (Progressing)       Start:  04/05/25    Expected End:  04/18/25             Patient will complete toileting including hygiene clothing management/hygiene with stand by assist level of assistance. (Progressing)       Start:  04/05/25    Expected End:  04/18/25

## 2025-04-11 PROCEDURE — 96372 THER/PROPH/DIAG INJ SC/IM: CPT

## 2025-04-11 PROCEDURE — G0378 HOSPITAL OBSERVATION PER HR: HCPCS

## 2025-04-11 PROCEDURE — 2500000001 HC RX 250 WO HCPCS SELF ADMINISTERED DRUGS (ALT 637 FOR MEDICARE OP): Performed by: EMERGENCY MEDICINE

## 2025-04-11 PROCEDURE — 2500000001 HC RX 250 WO HCPCS SELF ADMINISTERED DRUGS (ALT 637 FOR MEDICARE OP)

## 2025-04-11 PROCEDURE — 97116 GAIT TRAINING THERAPY: CPT | Mod: GP,CQ

## 2025-04-11 PROCEDURE — 99232 SBSQ HOSP IP/OBS MODERATE 35: CPT | Performed by: FAMILY MEDICINE

## 2025-04-11 PROCEDURE — 97110 THERAPEUTIC EXERCISES: CPT | Mod: GP,CQ

## 2025-04-11 PROCEDURE — 2500000004 HC RX 250 GENERAL PHARMACY W/ HCPCS (ALT 636 FOR OP/ED)

## 2025-04-11 RX ORDER — LORAZEPAM 2 MG/ML
0.5 INJECTION INTRAMUSCULAR EVERY 8 HOURS PRN
Status: DISCONTINUED | OUTPATIENT
Start: 2025-04-11 | End: 2025-04-12

## 2025-04-11 RX ADMIN — ATORVASTATIN CALCIUM 20 MG: 20 TABLET, FILM COATED ORAL at 21:55

## 2025-04-11 RX ADMIN — ENOXAPARIN SODIUM 30 MG: 30 INJECTION SUBCUTANEOUS at 16:32

## 2025-04-11 RX ADMIN — ASPIRIN 81 MG: 81 TABLET, COATED ORAL at 08:52

## 2025-04-11 RX ADMIN — PANTOPRAZOLE SODIUM 40 MG: 40 TABLET, DELAYED RELEASE ORAL at 06:22

## 2025-04-11 ASSESSMENT — COGNITIVE AND FUNCTIONAL STATUS - GENERAL
DRESSING REGULAR UPPER BODY CLOTHING: A LITTLE
CLIMB 3 TO 5 STEPS WITH RAILING: TOTAL
MOBILITY SCORE: 16
MOVING TO AND FROM BED TO CHAIR: A LITTLE
MOVING TO AND FROM BED TO CHAIR: A LITTLE
MOVING FROM LYING ON BACK TO SITTING ON SIDE OF FLAT BED WITH BEDRAILS: A LITTLE
STANDING UP FROM CHAIR USING ARMS: A LITTLE
TOILETING: A LITTLE
MOBILITY SCORE: 19
WALKING IN HOSPITAL ROOM: A LITTLE
DRESSING REGULAR LOWER BODY CLOTHING: A LITTLE
DAILY ACTIVITIY SCORE: 18
CLIMB 3 TO 5 STEPS WITH RAILING: A LOT
WALKING IN HOSPITAL ROOM: A LITTLE
STANDING UP FROM CHAIR USING ARMS: A LITTLE
PERSONAL GROOMING: A LITTLE
TURNING FROM BACK TO SIDE WHILE IN FLAT BAD: A LITTLE
HELP NEEDED FOR BATHING: A LITTLE
EATING MEALS: A LITTLE

## 2025-04-11 ASSESSMENT — PAIN SCALES - WONG BAKER: WONGBAKER_NUMERICALRESPONSE: NO HURT

## 2025-04-11 ASSESSMENT — PAIN SCALES - GENERAL
PAINLEVEL_OUTOF10: 0 - NO PAIN

## 2025-04-11 ASSESSMENT — PAIN - FUNCTIONAL ASSESSMENT: PAIN_FUNCTIONAL_ASSESSMENT: 0-10

## 2025-04-11 NOTE — CARE PLAN
The patient's goals for the shift include  Pt will be fall and injury free    The clinical goals for the shift include Pt will remain fall and injury free for day shift      Problem: Discharge Planning  Goal: Discharge to home or other facility with appropriate resources  4/11/2025 1358 by Hunter Alva RN  Outcome: Progressing  4/11/2025 1356 by Hunter Alva RN  Outcome: Not Progressing  4/11/2025 1355 by Hunter Alva RN  Outcome: Not Progressing  4/11/2025 1355 by Hunter Alva RN  Outcome: Progressing

## 2025-04-11 NOTE — PROGRESS NOTES
Nutrition Initial Assessment:   Nutrition Assessment       LOS screen    Medical history per chart: CAD s/p prior CABG, HTN, DLD presenting with unwitnessed fall     Admitted for fall, UTI without hematuria, generalized weakness    4/11: Screening pt for LOS. Pt sleeping at time of visit. Presumed dementia noted in H&P. RN reported mentation better today, no GI complaints. Pt eating well, mostly % of meals. Pt weight >100% IBW, skin intact, labs and medications noted. RD will follow per protocol, available for consult if needed.       Average meal Intake during admission: %   Dietary Orders (From admission, onward)       Start     Ordered    04/09/25 1814  Adult diet Regular  Diet effective now        Question:  Diet type  Answer:  Regular    04/09/25 1813    04/05/25 1804  May Participate in Room Service With Assistance  ( ROOM SERVICE MAY PARTICIPATE WITH ASSISTANCE)  Once        Question:  .  Answer:  Yes    04/05/25 1803

## 2025-04-11 NOTE — PROGRESS NOTES
Physical Therapy    Physical Therapy Treatment    Patient Name: Sylvia Moody  MRN: [id number]  Department: [facility] [address] [facility]  Room: 2311/2311-B  Today's Date: 4/11/2025        Assessment/Plan   PT Assessment  PT Assessment Results: Decreased strength, Decreased endurance, Impaired balance  Barriers to Discharge Home: Caregiver assistance, Cognition needs, Physical needs  End of Session Communication: PCT/NA/CTA  Assessment Comment: Patient tolerated tx session well progressing towards requiring decreased assist w/ transfers and gait trg Min A to CGA this tx session. Pt still requires maximal 75% verbal and tactile cueing for proper hand placemnet with AD during transfers and gait. Patient will continue to progress towards goals and ADL's to return to OF.  End of Session Patient Position: Up in chair, Alarm on  PT Plan  Inpatient/Swing Bed or Outpatient: Inpatient  PT Plan  Treatment/Interventions: Transfer training, Gait training, Stair training, Balance training, Neuromuscular re-education, Strengthening, Endurance training, Therapeutic exercise, Therapeutic activity, Postural re-education, Positioning, Bed mobility  PT Plan: Ongoing PT  PT Frequency: 3 times per week  PT Discharge Recommendations: Moderate intensity level of continued care      General Visit Information:   PT  Visit  PT Received On: 04/11/25  Response to Previous Treatment: Patient with no complaints from previous session.  General  Reason for Referral: Impaired Mobility  Past Medical History Relevant to Rehab: CAD s/p CABG, dyslipidemia, recent fall  Family/Caregiver Present: No  Prior to Session Communication: Bedside nurse  Patient Position Received: Up in chair, Alarm on  General Comment: Patient alert and agreeable to tx session.    Subjective   Precautions:  Precautions  Hearing/Visual Limitations: [place]: no hearing aids in ED  Medical Precautions: Fall precautions          Objective   Pain:  Pain Assessment  Pain Assessment: 0-10  0-10 (Numeric)  Pain Score: 0 - No pain  Cognition:  Cognition  Orientation Level: Disoriented to situation  Processing Speed: Delayed     Activity Tolerance:  Activity Tolerance  Endurance: Tolerates less than 10 min exercise, no significant change in vital signs  Treatments:  Therapeutic Exercise  Therapeutic Exercise Performed: Yes  Therapeutic Exercise Activity 1: Patient performed seated eric LE exercises to increase functional mobility; ankle pumps x20, alternating hip flexion 2x10.    Ambulation/Gait Training  Ambulation/Gait Training Performed: Yes  Ambulation/Gait Training 1  Surface 1: Level tile  Device 1: Rolling walker  Assistance 1: Contact guard, Maximum verbal cues  Quality of Gait 1: Decreased step length, Diminished heel strike  Comments/Distance (ft) 1: Patient requires maximal cueing for direction and understands better with single word phrases.  Transfer 1  Transfer From 1: Chair with arms to  Transfer to 1: Stand  Technique 1: Sit to stand  Transfer Device 1: Walker  Transfer Level of Assistance 1: Contact guard, Maximum verbal cues  Trials/Comments 1: Patient requires single word phrases to complete task d/t poor cognition.  Transfers 2  Transfer From 2: Stand to  Transfer to 2: Chair with arms  Technique 2: Stand to sit  Transfer Device 2: Walker  Transfer Level of Assistance 2: Contact guard, Maximum verbal cues  Trials/Comments 2: Patient requires single word phrases to complete task d/t poor cognition.    Outcome Measures:  [facility] Basic Mobility  Turning from your back to your side while in a flat bed without using bedrails: A little  Moving from lying on your back to sitting on the side of a flat bed without using bedrails: A little  Moving to and from bed to chair (including a wheelchair): A little  Standing up from a chair using your arms (e.g. wheelchair or bedside chair): A little  To walk in hospital room: A little  Climbing 3-5 steps with railing: Total  Basic Mobility - Total Score: 16    Education  Documentation  Mobility Training, taught by Gianna D'Amico, S-PTA at 4/11/2025  3:01 PM.  Learner: Patient  Readiness: Acceptance  Method: Explanation  Response: Verbalizes Understanding    Precautions, taught by Gianna D'Amico, S-PTA at 4/11/2025  3:01 PM.  Learner: Patient  Readiness: Acceptance  Method: Explanation  Response: Verbalizes Understanding    ADL Training, taught by Gianna D'Amico, S-PTA at 4/11/2025  3:01 PM.  Learner: Patient  Readiness: Acceptance  Method: Explanation  Response: Verbalizes Understanding    Education Comments  No comments found.         Encounter Problems       Encounter Problems (Active)       Balance       STG - Maintains static standing balance without upper extremity support and Supervision (Progressing)       Start:  04/05/25    Expected End:  04/19/25       INTERVENTIONS:1. Practice standing with minimal support.2. Educate patient about maintaining total hip precautions while maintaining balance.3. Educate patient about standing tolerance.4. Educate patient about independence with gait, transfers, and ADL's.5. Educate patient about use of assistive device.6. Educate patient about self-directed care.            Mobility       STG - Patient will ambulate 50ft with FWW safely with Supervision (Progressing)       Start:  04/05/25    Expected End:  04/19/25               PT Transfers       STG - Patient will transfer sit to and from stand using fWW with safe technique and Supervision (Progressing)       Start:  04/05/25    Expected End:  04/19/25               Pain - Adult            Gianna D'Amico, S-PTA

## 2025-04-11 NOTE — PROGRESS NOTES
Awaiting appeal decision regarding SNF auth denial. Appeal was started 4/10 1200, can take up to 72 hours for decision. [facility] will continue to follow for needs if they arise.     [id number] Faxed updated clinicals to [phone].

## 2025-04-11 NOTE — CARE PLAN
The patient's goals for the shift include  injury and fall free    The clinical goals for the shift include Pt will remain fall and injury free for day shift

## 2025-04-11 NOTE — PROGRESS NOTES
SUBJECTIVE     Pt requiring sitter due to impulsivity  Has been having visual hallucinations    4/7:              Today the patient is found awake alert and interactive appropriately sitting in the bedside chair.  Complains only of feeling tired and weak.  No acute events overnight.  Her Coreg was held yesterday due to bradycardia.  Continues today with heart rate ranging from 54-79.  Asymptomatic with this.  Blood pressure stable.  Otherwise denies interval new symptoms or complaints including chest pain palpitations pleuritic type pain unusual shortness of breath cough sputum nausea abdominal pain flank pain fever or chills.    4/8:              Today the patient remains awake alert and interactive appropriately in bed.  No acute events overnight.  Voices no specific complaints.  Heart rate has remained mildly bradycardic and asymptomatic.  Coreg continues to be held.  Not requiring a sitter.  At this time awaiting authorization for SNF. Otherwise denies interval new symptoms or complaints including chest pain palpitations pleuritic type pain unusual shortness of breath cough sputum nausea abdominal pain flank pain fever or chills    4/9:               Today the patient once again is awake alert and interactive appropriately in bed.  Specific complaints and no acute events overnight.  Heart rate appears improved in the normal range today.  Blood pressures trending up and may need to resume Coreg.  Continuing to await authorization for SNF. Otherwise denies interval new symptoms or complaints including chest pain palpitations pleuritic type pain unusual shortness of breath cough sputum nausea abdominal pain flank pain fever or chills    4/10:               Today the patient is found sleeping but does awaken to name and exam.  Voices no specific complaints and no acute events overnight.  Continues with good heart rate and blood pressure levels.  Notified of insurance denial for SNF and awaiting appeal which likely  "will be on the weekend.  Otherwise denies interval new symptoms or complaints including chest pain palpitations pleuritic type pain unusual shortness of breath cough sputum nausea abdominal pain flank pain fever or chills    4/11:               Today patient is seen in the bedside chair more awake alert and interactive appropriately.  Voices no specific complaints and no acute complaints.  Today continues with mild asymptomatic bradycardia.  Continues to do well on room air.  Blood pressure normal and stable.  Continuing at this point to await appeal for SNF. Otherwise denies interval new symptoms or complaints including chest pain palpitations pleuritic type pain unusual shortness of breath cough sputum nausea abdominal pain flank pain fever or chills    OBJECTIVE:       Physical Exam  BP 93/57 (BP Location: Right arm, Patient Position: Sitting) Comment: let nurse know  Pulse 62   Temp 35.9 °C (96.7 °F) (Temporal)   Resp 18   Ht 1.651 m (5' 5\")   Wt 74.8 kg (164 lb 14.5 oz)   SpO2 98%   BMI 27.44 kg/m²   Body mass index is 27.44 kg/m².    GEN - NAD, slender elderly  female awake alert and interactive appropriately  PULM - CTA  CVS - RRR, normal rate, 2/6 murmur  ABD - soft and nontender  EXTR - trace bilat LE edema  Psych: Pleasant and cooperative to exam    Scheduled medications  aspirin, 81 mg, oral, Daily  atorvastatin, 20 mg, oral, Nightly  [Held by provider] carvedilol, 3.125 mg, oral, BID  enoxaparin, 30 mg, subcutaneous, q24h  lisinopril, 10 mg, oral, Daily  pantoprazole, 40 mg, oral, Daily before breakfast   Or  pantoprazole, 40 mg, intravenous, Daily before breakfast      Continuous medications     PRN medications  PRN medications: acetaminophen, guaiFENesin, melatonin, ondansetron, polyethylene glycol    Labs  Results from last 7 days   Lab Units 04/08/25  0425 04/06/25  0529 04/04/25  2114   WBC AUTO x10*3/uL 6.0 6.0 6.5   HEMOGLOBIN g/dL 9.6* 10.1* 10.4*   HEMATOCRIT % 30.8* 31.4* 32.3* " "  PLATELETS AUTO x10*3/uL 260 293 270     Results from last 7 days   Lab Units 04/09/25  0449 04/08/25  0425 04/06/25  0529 04/04/25  2114   SODIUM mmol/L 137 138 140 136   POTASSIUM mmol/L 4.3 4.0 4.1 4.7   CHLORIDE mmol/L 106 107 104 109*   CO2 mmol/L 25 25 23 20*   BUN mg/dL 24* 22 17 23   CREATININE mg/dL 1.19* 1.29* 1.09* 1.13*   CALCIUM mg/dL 9.7 8.9 9.7 9.5   PROTEIN TOTAL g/dL  --   --  6.3* 6.3*   BILIRUBIN TOTAL mg/dL  --   --  0.9 0.5   ALK PHOS U/L  --   --  55 66   ALT U/L  --   --  6* 7   AST U/L  --   --  14 17   GLUCOSE mg/dL 80 88 83 94       Microbiology:   No results found for the last 90 days.                   No lab exists for component: \"AGALPCRNB\"   .ID  Lab Results   Component Value Date    URINECULTURE  04/05/2025     Growth indicates contamination with periurethral roly. Repeat culture if clinically indicated.         ASSESSMENT & PLAN:     1)  Fall/Debility  -  no fractures or acute injuries found during workup  - PT eval pending and will likely need SNF.  PT AMPAC is 16 - even if not qualifying for SNF it sounds like she will need placement due to presumed dementia and unsafe living independently  - sounds like family has 24/7 caregivers and has cameras in the home for safety  - no other acute metabolic or infectious etiologies found  4/7: AM-PAC 15.  Working towards SNF.    2)  Possible UTI  -  empiric Keflex started but culture suggests contamination >> will stop abx    3) CAD/HTN/bradycardia  -  resumed home meds  - has been bradycardic and will hold coreg  4/7: Continues today with mild asymptomatic bradycardia.    4/9: Heart rates primarily good range and normal today.  Blood pressure perhaps starting to trend up and may need to resume Coreg.  Continue to monitor.        Disposition  SNF authorization denied now awaiting appeal          "

## 2025-04-12 PROCEDURE — 2500000001 HC RX 250 WO HCPCS SELF ADMINISTERED DRUGS (ALT 637 FOR MEDICARE OP): Performed by: EMERGENCY MEDICINE

## 2025-04-12 PROCEDURE — 2500000005 HC RX 250 GENERAL PHARMACY W/O HCPCS

## 2025-04-12 PROCEDURE — 2500000001 HC RX 250 WO HCPCS SELF ADMINISTERED DRUGS (ALT 637 FOR MEDICARE OP)

## 2025-04-12 PROCEDURE — 99232 SBSQ HOSP IP/OBS MODERATE 35: CPT | Performed by: FAMILY MEDICINE

## 2025-04-12 PROCEDURE — 96372 THER/PROPH/DIAG INJ SC/IM: CPT

## 2025-04-12 PROCEDURE — 96372 THER/PROPH/DIAG INJ SC/IM: CPT | Performed by: INTERNAL MEDICINE

## 2025-04-12 PROCEDURE — G0378 HOSPITAL OBSERVATION PER HR: HCPCS

## 2025-04-12 PROCEDURE — 97530 THERAPEUTIC ACTIVITIES: CPT | Mod: GO,CO,59 | Performed by: OCCUPATIONAL THERAPY ASSISTANT

## 2025-04-12 PROCEDURE — 2500000004 HC RX 250 GENERAL PHARMACY W/ HCPCS (ALT 636 FOR OP/ED): Performed by: INTERNAL MEDICINE

## 2025-04-12 PROCEDURE — 2500000004 HC RX 250 GENERAL PHARMACY W/ HCPCS (ALT 636 FOR OP/ED)

## 2025-04-12 RX ORDER — LORAZEPAM 0.5 MG/1
0.5 TABLET ORAL EVERY 8 HOURS PRN
Status: DISCONTINUED | OUTPATIENT
Start: 2025-04-12 | End: 2025-04-21 | Stop reason: HOSPADM

## 2025-04-12 RX ORDER — HALOPERIDOL LACTATE 5 MG/ML
5 INJECTION, SOLUTION INTRAMUSCULAR ONCE
Status: COMPLETED | OUTPATIENT
Start: 2025-04-12 | End: 2025-04-12

## 2025-04-12 RX ADMIN — LORAZEPAM 0.5 MG: 0.5 TABLET ORAL at 00:27

## 2025-04-12 RX ADMIN — LISINOPRIL 10 MG: 10 TABLET ORAL at 08:19

## 2025-04-12 RX ADMIN — ACETAMINOPHEN 650 MG: 325 TABLET ORAL at 20:32

## 2025-04-12 RX ADMIN — ENOXAPARIN SODIUM 30 MG: 30 INJECTION SUBCUTANEOUS at 16:49

## 2025-04-12 RX ADMIN — Medication 3 MG: at 20:32

## 2025-04-12 RX ADMIN — ATORVASTATIN CALCIUM 20 MG: 20 TABLET, FILM COATED ORAL at 20:32

## 2025-04-12 RX ADMIN — ASPIRIN 81 MG: 81 TABLET, COATED ORAL at 08:19

## 2025-04-12 RX ADMIN — HALOPERIDOL LACTATE 5 MG: 5 INJECTION, SOLUTION INTRAMUSCULAR at 02:39

## 2025-04-12 ASSESSMENT — COGNITIVE AND FUNCTIONAL STATUS - GENERAL
DRESSING REGULAR UPPER BODY CLOTHING: A LITTLE
TOILETING: A LOT
PERSONAL GROOMING: A LITTLE
STANDING UP FROM CHAIR USING ARMS: A LITTLE
MOVING TO AND FROM BED TO CHAIR: A LITTLE
WALKING IN HOSPITAL ROOM: A LOT
DAILY ACTIVITIY SCORE: 19
MOVING TO AND FROM BED TO CHAIR: A LITTLE
DRESSING REGULAR UPPER BODY CLOTHING: A LOT
PERSONAL GROOMING: A LITTLE
DAILY ACTIVITIY SCORE: 19
MOBILITY SCORE: 17
CLIMB 3 TO 5 STEPS WITH RAILING: TOTAL
EATING MEALS: A LITTLE
TOILETING: A LITTLE
STANDING UP FROM CHAIR USING ARMS: A LITTLE
PERSONAL GROOMING: A LITTLE
DRESSING REGULAR LOWER BODY CLOTHING: A LITTLE
HELP NEEDED FOR BATHING: A LOT
DAILY ACTIVITIY SCORE: 14
HELP NEEDED FOR BATHING: A LITTLE
DRESSING REGULAR LOWER BODY CLOTHING: A LOT
TOILETING: A LITTLE
DRESSING REGULAR LOWER BODY CLOTHING: A LITTLE
HELP NEEDED FOR BATHING: A LITTLE
WALKING IN HOSPITAL ROOM: A LOT
DRESSING REGULAR UPPER BODY CLOTHING: A LITTLE
CLIMB 3 TO 5 STEPS WITH RAILING: TOTAL
MOBILITY SCORE: 17

## 2025-04-12 ASSESSMENT — PAIN SCALES - GENERAL
PAINLEVEL_OUTOF10: 0 - NO PAIN
PAINLEVEL_OUTOF10: 2
PAINLEVEL_OUTOF10: 0 - NO PAIN

## 2025-04-12 ASSESSMENT — PAIN - FUNCTIONAL ASSESSMENT
PAIN_FUNCTIONAL_ASSESSMENT: 0-10
PAIN_FUNCTIONAL_ASSESSMENT: 0-10

## 2025-04-12 ASSESSMENT — PAIN DESCRIPTION - LOCATION: LOCATION: GENERALIZED

## 2025-04-12 NOTE — PROGRESS NOTES
SUBJECTIVE     Pt requiring sitter due to impulsivity  Has been having visual hallucinations    4/7:              Today the patient is found awake alert and interactive appropriately sitting in the bedside chair.  Complains only of feeling tired and weak.  No acute events overnight.  Her Coreg was held yesterday due to bradycardia.  Continues today with heart rate ranging from 54-79.  Asymptomatic with this.  Blood pressure stable.  Otherwise denies interval new symptoms or complaints including chest pain palpitations pleuritic type pain unusual shortness of breath cough sputum nausea abdominal pain flank pain fever or chills.    4/8:              Today the patient remains awake alert and interactive appropriately in bed.  No acute events overnight.  Voices no specific complaints.  Heart rate has remained mildly bradycardic and asymptomatic.  Coreg continues to be held.  Not requiring a sitter.  At this time awaiting authorization for SNF. Otherwise denies interval new symptoms or complaints including chest pain palpitations pleuritic type pain unusual shortness of breath cough sputum nausea abdominal pain flank pain fever or chills    4/9:               Today the patient once again is awake alert and interactive appropriately in bed.  Specific complaints and no acute events overnight.  Heart rate appears improved in the normal range today.  Blood pressures trending up and may need to resume Coreg.  Continuing to await authorization for SNF. Otherwise denies interval new symptoms or complaints including chest pain palpitations pleuritic type pain unusual shortness of breath cough sputum nausea abdominal pain flank pain fever or chills    4/10:               Today the patient is found sleeping but does awaken to name and exam.  Voices no specific complaints and no acute events overnight.  Continues with good heart rate and blood pressure levels.  Notified of insurance denial for SNF and awaiting appeal which likely  "will be on the weekend.  Otherwise denies interval new symptoms or complaints including chest pain palpitations pleuritic type pain unusual shortness of breath cough sputum nausea abdominal pain flank pain fever or chills    4/11:               Today patient is seen in the bedside chair more awake alert and interactive appropriately.  Voices no specific complaints and no acute complaints.  Today continues with mild asymptomatic bradycardia.  Continues to do well on room air.  Blood pressure normal and stable.  Continuing at this point to await appeal for SNF. Otherwise denies interval new symptoms or complaints including chest pain palpitations pleuritic type pain unusual shortness of breath cough sputum nausea abdominal pain flank pain fever or chills    4/12:               Today patient seen in bed and remains awake alert and interactive appropriately.  Voices no specific complaints and no acute events overnight.  Continues on room air.  Vital signs otherwise normal and stable. Continuing at this point to await appeal for SNF. Otherwise denies interval new symptoms or complaints including chest pain palpitations pleuritic type pain unusual shortness of breath cough sputum nausea abdominal pain flank pain fever or chills      OBJECTIVE:       Physical Exam  /66 (BP Location: Right arm, Patient Position: Lying)   Pulse 51   Temp 36.4 °C (97.5 °F) (Temporal)   Resp 18   Ht 1.651 m (5' 5\")   Wt 74.8 kg (164 lb 14.5 oz)   SpO2 98%   BMI 27.44 kg/m²   Body mass index is 27.44 kg/m².    GEN - NAD, slender elderly  female awake alert and interactive appropriately  PULM - CTA  CVS - RRR, normal rate, 2/6 murmur  ABD - soft and nontender  EXTR - trace bilat LE edema  Psych: Pleasant and cooperative to exam    Scheduled medications  aspirin, 81 mg, oral, Daily  atorvastatin, 20 mg, oral, Nightly  [Held by provider] carvedilol, 3.125 mg, oral, BID  enoxaparin, 30 mg, subcutaneous, q24h  lisinopril, 10 " "mg, oral, Daily  pantoprazole, 40 mg, oral, Daily before breakfast   Or  pantoprazole, 40 mg, intravenous, Daily before breakfast      Continuous medications     PRN medications  PRN medications: acetaminophen, guaiFENesin, LORazepam, melatonin, ondansetron, polyethylene glycol    Labs  Results from last 7 days   Lab Units 04/08/25 0425 04/06/25  0529   WBC AUTO x10*3/uL 6.0 6.0   HEMOGLOBIN g/dL 9.6* 10.1*   HEMATOCRIT % 30.8* 31.4*   PLATELETS AUTO x10*3/uL 260 293     Results from last 7 days   Lab Units 04/09/25  0449 04/08/25  0425 04/06/25  0529   SODIUM mmol/L 137 138 140   POTASSIUM mmol/L 4.3 4.0 4.1   CHLORIDE mmol/L 106 107 104   CO2 mmol/L 25 25 23   BUN mg/dL 24* 22 17   CREATININE mg/dL 1.19* 1.29* 1.09*   CALCIUM mg/dL 9.7 8.9 9.7   PROTEIN TOTAL g/dL  --   --  6.3*   BILIRUBIN TOTAL mg/dL  --   --  0.9   ALK PHOS U/L  --   --  55   ALT U/L  --   --  6*   AST U/L  --   --  14   GLUCOSE mg/dL 80 88 83       Microbiology:   No results found for the last 90 days.                   No lab exists for component: \"AGALPCRNB\"   .ID  Lab Results   Component Value Date    URINECULTURE  04/05/2025     Growth indicates contamination with periurethral roly. Repeat culture if clinically indicated.         ASSESSMENT & PLAN:     1)  Fall/Debility  -  no fractures or acute injuries found during workup  - PT eval pending and will likely need SNF.  PT [facility] is 16 - even if not qualifying for SNF it sounds like she will need placement due to presumed dementia and unsafe living independently  - sounds like family has 24/7 caregivers and has cameras in the home for safety  - no other acute metabolic or infectious etiologies found  4/7: AM-PAC 15.  Working towards SNF.    2)  Possible UTI  -  empiric Keflex started but culture suggests contamination >> will stop abx    3) CAD/HTN/bradycardia  -  resumed home meds  - has been bradycardic and will hold coreg  4/7: Continues today with mild asymptomatic bradycardia.    4/9: " Heart rates primarily good range and normal today.  Blood pressure perhaps starting to trend up and may need to resume Coreg.  Continue to monitor.        Disposition  SNF authorization denied now awaiting appeal

## 2025-04-12 NOTE — PROGRESS NOTES
Occupational Therapy    OT Treatment    Patient Name: Sylvia Moody  MRN: [id number]  Department: [facility] 2 E OBS  Room: 231/2311-B  Today's Date: 4/12/2025  Time Calculation  Start Time: 0754  Stop Time: 0804  Time Calculation (min): 10 min        Assessment:  OT Assessment: Pt is making gradual progress with OT for transfers and mob with pt overall at a min A level wiht pt limited from decrease cognition and safety awareness. Pt remains a high fall risk and would benefit from ongoing skilled OT tx.  End of Session Communication: PCT/NA/CTA, Bedside nurse  End of Session Patient Position: Bed, 3 rail up, Alarm off, not on at start of session, Alarm off, caregiver present (sitter)     Plan:  Treatment Interventions: ADL retraining, Functional transfer training, UE strengthening/ROM, Endurance training, Patient/family training, Cognitive reorientation, Neuromuscular reeducation, Equipment evaluation/education  OT Frequency: 3 times per week  OT Discharge Recommendations: Moderate intensity level of continued care  Equipment Recommended upon Discharge: Other (comment) (TBD)  OT - OK to Discharge: Yes  Treatment Interventions: ADL retraining, Functional transfer training, UE strengthening/ROM, Endurance training, Patient/family training, Cognitive reorientation, Neuromuscular reeducation, Equipment evaluation/education    Subjective   Previous Visit Info:  OT Last Visit  OT Received On: 04/12/25  General:  General  Family/Caregiver Present: No  Prior to Session Communication: Bedside nurse  Patient Position Received: Bed, 3 rail up, Alarm off, caregiver present (sitter)  General Comment: Pt supine in bed upon arrival and agreeable to OT tx, confusion noted.  Precautions:        Date/Time Vitals Session Patient Position Pulse Resp SpO2 BP MAP (mmHg)    04/12/25 0900 --  --  62  16  96 %  152/74  100                 Pain:  Pain Assessment  Pain Assessment: 0-10  0-10 (Numeric) Pain Score: 0 - No pain    Objective     Cognition:  Cognition  Overall Cognitive Status: Impaired  Orientation Level: Disoriented to situation, Disoriented to time, Disoriented to place  Coordination:     Activities of Daily Living:   Functional Standing Tolerance:  Functional Standing Tolerance Comments: Pt sera up to 3 min intervals of standing with FWW for transfers and mob in room with min A with pt limited from fatigue and decrease bal with walker safety training during mob.  Bed Mobility/Transfers: Bed Mobility  Bed Mobility:  (Pt completed bed mob with HOB elevated and using bed rail with assist for BLE)    Transfers  Transfer:  (Pt training for transfers to FWW with cues for tech and safety with min A and poor carryover)      Outcome Measures:[facility] Daily Activity  Putting on and taking off regular lower body clothing: A lot  Bathing (including washing, rinsing, drying): A lot  Putting on and taking off regular upper body clothing: A lot  Toileting, which includes using toilet, bedpan or urinal: A lot  Taking care of personal grooming such as brushing teeth: A little  Eating Meals: A little  Daily Activity - Total Score: 14        Education Documentation  Precautions, taught by LYLE Milton at 4/12/2025 10:09 AM.  Learner: Patient  Readiness: Nonacceptance  Method: Explanation  Response: No Evidence of Learning    Education Comments  No comments found.        OP EDUCATION:       Goals:  Encounter Problems       Encounter Problems (Active)       ADLs       Patient will perform UB and LB bathing with minimal assist  level of assistance and PRN DME/AE. (Not Progressing)       Start:  04/05/25    Expected End:  04/18/25            Patient with complete upper body dressing with set-up level of assistance donning and doffing all UE clothes with PRN adaptive equipment  (Not Progressing)       Start:  04/05/25    Expected End:  04/18/25            Patient with complete lower body dressing with supervision level of assistance donning and doffing  all LE clothes  with PRN adaptive equipment  (Not Progressing)       Start:  04/05/25    Expected End:  04/18/25            Patient will feed self with modified independent level of assistance and  using PRN adaptive equipment. (Not Progressing)       Start:  04/05/25    Expected End:  04/18/25            Patient will complete daily grooming tasks brushing teeth and washing face/hair with set-up level of assistance and PRN adaptive equipment . (Not Progressing)       Start:  04/05/25    Expected End:  04/18/25            Patient will complete toileting including hygiene clothing management/hygiene with stand by assist level of assistance. (Not Progressing)       Start:  04/05/25    Expected End:  04/18/25

## 2025-04-12 NOTE — CARE PLAN
Problem: Skin  Goal: Prevent/manage excess moisture  Outcome: Progressing  Flowsheets (Taken 4/11/2025 2155)  Prevent/manage excess moisture: Moisturize dry skin

## 2025-04-12 NOTE — CARE PLAN
The patient's goals for the shift include  free from fall    The clinical goals for the shift include Pt free from fall and injury    Problem: Discharge Planning  Goal: Discharge to home or other facility with appropriate resources  Outcome: Not Progressing

## 2025-04-13 VITALS
WEIGHT: 164.9 LBS | HEIGHT: 65 IN | TEMPERATURE: 97.8 F | OXYGEN SATURATION: 97 % | DIASTOLIC BLOOD PRESSURE: 70 MMHG | HEART RATE: 79 BPM | RESPIRATION RATE: 17 BRPM | BODY MASS INDEX: 27.47 KG/M2 | SYSTOLIC BLOOD PRESSURE: 117 MMHG

## 2025-04-13 PROCEDURE — 97116 GAIT TRAINING THERAPY: CPT | Mod: GP,CQ | Performed by: PHYSICAL THERAPY ASSISTANT

## 2025-04-13 PROCEDURE — 96372 THER/PROPH/DIAG INJ SC/IM: CPT

## 2025-04-13 PROCEDURE — 99232 SBSQ HOSP IP/OBS MODERATE 35: CPT | Performed by: FAMILY MEDICINE

## 2025-04-13 PROCEDURE — 2500000005 HC RX 250 GENERAL PHARMACY W/O HCPCS

## 2025-04-13 PROCEDURE — 97110 THERAPEUTIC EXERCISES: CPT | Mod: GP,CQ | Performed by: PHYSICAL THERAPY ASSISTANT

## 2025-04-13 PROCEDURE — 2500000001 HC RX 250 WO HCPCS SELF ADMINISTERED DRUGS (ALT 637 FOR MEDICARE OP): Performed by: EMERGENCY MEDICINE

## 2025-04-13 PROCEDURE — G0378 HOSPITAL OBSERVATION PER HR: HCPCS

## 2025-04-13 PROCEDURE — 2500000004 HC RX 250 GENERAL PHARMACY W/ HCPCS (ALT 636 FOR OP/ED)

## 2025-04-13 PROCEDURE — 2500000001 HC RX 250 WO HCPCS SELF ADMINISTERED DRUGS (ALT 637 FOR MEDICARE OP)

## 2025-04-13 RX ADMIN — ACETAMINOPHEN 650 MG: 325 TABLET ORAL at 20:05

## 2025-04-13 RX ADMIN — Medication 3 MG: at 20:05

## 2025-04-13 RX ADMIN — ASPIRIN 81 MG: 81 TABLET, COATED ORAL at 08:41

## 2025-04-13 RX ADMIN — PANTOPRAZOLE SODIUM 40 MG: 40 TABLET, DELAYED RELEASE ORAL at 04:34

## 2025-04-13 RX ADMIN — ENOXAPARIN SODIUM 30 MG: 30 INJECTION SUBCUTANEOUS at 15:49

## 2025-04-13 RX ADMIN — ATORVASTATIN CALCIUM 20 MG: 20 TABLET, FILM COATED ORAL at 20:05

## 2025-04-13 ASSESSMENT — COGNITIVE AND FUNCTIONAL STATUS - GENERAL
CLIMB 3 TO 5 STEPS WITH RAILING: TOTAL
DRESSING REGULAR LOWER BODY CLOTHING: A LITTLE
EATING MEALS: A LITTLE
EATING MEALS: A LITTLE
MOVING TO AND FROM BED TO CHAIR: A LITTLE
DRESSING REGULAR UPPER BODY CLOTHING: A LITTLE
MOVING TO AND FROM BED TO CHAIR: A LITTLE
MOBILITY SCORE: 17
DRESSING REGULAR UPPER BODY CLOTHING: A LITTLE
HELP NEEDED FOR BATHING: A LITTLE
STANDING UP FROM CHAIR USING ARMS: A LITTLE
TOILETING: A LITTLE
CLIMB 3 TO 5 STEPS WITH RAILING: TOTAL
MOVING TO AND FROM BED TO CHAIR: A LITTLE
WALKING IN HOSPITAL ROOM: A LOT
PERSONAL GROOMING: A LITTLE
PERSONAL GROOMING: A LITTLE
DRESSING REGULAR LOWER BODY CLOTHING: A LITTLE
DAILY ACTIVITIY SCORE: 18
MOBILITY SCORE: 17
DAILY ACTIVITIY SCORE: 18
WALKING IN HOSPITAL ROOM: A LOT
MOBILITY SCORE: 17
WALKING IN HOSPITAL ROOM: A LOT
STANDING UP FROM CHAIR USING ARMS: A LITTLE
CLIMB 3 TO 5 STEPS WITH RAILING: TOTAL
STANDING UP FROM CHAIR USING ARMS: A LITTLE
TOILETING: A LITTLE
HELP NEEDED FOR BATHING: A LITTLE

## 2025-04-13 ASSESSMENT — PAIN SCALES - GENERAL
PAINLEVEL_OUTOF10: 0 - NO PAIN
PAINLEVEL_OUTOF10: 2

## 2025-04-13 ASSESSMENT — PAIN - FUNCTIONAL ASSESSMENT: PAIN_FUNCTIONAL_ASSESSMENT: 0-10

## 2025-04-13 ASSESSMENT — PAIN DESCRIPTION - LOCATION: LOCATION: GENERALIZED

## 2025-04-13 NOTE — CARE PLAN
The patient's goals for the shift include      The clinical goals for the shift include Pt will be free from fall or injury throughout shift      Problem: Pain - Adult  Goal: Verbalizes/displays adequate comfort level or baseline comfort level  Outcome: Progressing     Problem: Safety - Adult  Goal: Free from fall injury  Outcome: Progressing     Problem: Discharge Planning  Goal: Discharge to home or other facility with appropriate resources  Outcome: Progressing     Problem: Chronic Conditions and Co-morbidities  Goal: Patient's chronic conditions and co-morbidity symptoms are monitored and maintained or improved  Outcome: Progressing     Problem: Nutrition  Goal: Nutrient intake appropriate for maintaining nutritional needs  Outcome: Progressing     Problem: Skin  Goal: Decreased wound size/increased tissue granulation at next dressing change  Outcome: Progressing  Flowsheets (Taken 4/13/2025 1929)  Decreased wound size/increased tissue granulation at next dressing change: Promote sleep for wound healing  Goal: Participates in plan/prevention/treatment measures  Outcome: Progressing  Flowsheets (Taken 4/13/2025 1929)  Participates in plan/prevention/treatment measures: Elevate heels  Goal: Prevent/manage excess moisture  Outcome: Progressing  Flowsheets (Taken 4/13/2025 1929)  Prevent/manage excess moisture: Moisturize dry skin  Goal: Prevent/minimize sheer/friction injuries  Outcome: Progressing  Flowsheets (Taken 4/13/2025 1929)  Prevent/minimize sheer/friction injuries: Use pull sheet  Goal: Promote/optimize nutrition  Outcome: Progressing  Flowsheets (Taken 4/13/2025 1929)  Promote/optimize nutrition: Monitor/record intake including meals  Goal: Promote skin healing  Outcome: Progressing  Flowsheets (Taken 4/13/2025 1929)  Promote skin healing: Assess skin/pad under line(s)/device(s)

## 2025-04-13 NOTE — PROGRESS NOTES
Physical Therapy    Physical Therapy Treatment    Patient Name: Sylvia Moody  MRN: [id number]  Department: [facility] 2 E OBS  Room: 2311/2311-B  Today's Date: 4/13/2025  Time Calculation  Start Time: 1153  Stop Time: 1216  Time Calculation (min): 23 min         Assessment/Plan   PT Assessment  PT Assessment Results: Decreased strength, Decreased endurance, Impaired balance, Impaired hearing  Rehab Prognosis: Good  Barriers to Discharge Home: Caregiver assistance, Cognition needs, Physical needs  End of Session Communication: Bedside nurse, PCT/NA/CTA  Assessment Comment:  (Pt increased gait disance to 40x1 with cues for safety. Pt up in chair as lunch arrived.)  End of Session Patient Position: Up in chair, Alarm on  PT Plan  Inpatient/Swing Bed or Outpatient: Inpatient  PT Plan  Treatment/Interventions: Transfer training, Gait training, Stair training, Balance training, Neuromuscular re-education, Strengthening, Endurance training, Therapeutic exercise, Therapeutic activity, Postural re-education, Positioning, Bed mobility  PT Plan: Ongoing PT  PT Frequency: 3 times per week  PT Discharge Recommendations: Moderate intensity level of continued care      General Visit Information:   PT  Visit  PT Received On: 04/13/25  Response to Previous Treatment: Patient with no complaints from previous session.  General  Prior to Session Communication: Bedside nurse  Patient Position Received: Bed, 3 rail up, Alarm on  General Comment:  (2311 ok to tx and Pt agreeable to chair.)    Subjective   Precautions:  Precautions  Precautions Comment:  (falls)            Objective   Pain:  Pain Assessment  0-10 (Numeric) Pain Score: 0 - No pain  Cognition:  Cognition  Orientation Level: Disoriented to situation, Disoriented to time       Treatments:  Therapeutic Exercise  Therapeutic Exercise Activity 1:  (seated BLE ther ex 15reps all planes and motion.)    Bed Mobility 1  Bed Mobility 1: Supine to sitting  Level of Assistance 1: Close  supervision  Bed Mobility 2  Bed Mobility  2: Scooting  Level of Assistance 2: Contact guard    Ambulation/Gait Training 1  Surface 1: Level tile  Device 1: Rolling walker  Assistance 1: Contact guard  Quality of Gait 1: Inconsistent stride length, Diminished heel strike, Forward flexed posture  Comments/Distance (ft) 1:  ([id number] for AD approximation)  Transfer 1  Transfer From 1: Sit to, Stand to  Transfer to 1: Sit, Stand  Transfer Device 1: Walker  Transfer Level of Assistance 1: Contact guard, Minimal verbal cues  Trials/Comments 1:  (cues for hand placement)    Outcome Measures:  [facility] Basic Mobility  Turning from your back to your side while in a flat bed without using bedrails: None  Moving from lying on your back to sitting on the side of a flat bed without using bedrails: None  Moving to and from bed to chair (including a wheelchair): A little  Standing up from a chair using your arms (e.g. wheelchair or bedside chair): A little  To walk in hospital room: A lot  Climbing 3-5 steps with railing: Total  Basic Mobility - Total Score: 17    Education Documentation  Mobility Training, taught by Maria De Jesus Alfaro PTA at 4/13/2025 12:34 PM.  Learner: Patient  Readiness: Acceptance  Method: Explanation  Response: Needs Reinforcement  Comment: safety with mobility    Education Comments  No comments found.        OP EDUCATION:       Encounter Problems       Encounter Problems (Active)       Balance       STG - Maintains static standing balance without upper extremity support and Supervision (Progressing)       Start:  04/05/25    Expected End:  04/19/25       INTERVENTIONS:1. Practice standing with minimal support.2. Educate patient about maintaining total hip precautions while maintaining balance.3. Educate patient about standing tolerance.4. Educate patient about independence with gait, transfers, and ADL's.5. Educate patient about use of assistive device.6. Educate patient about self-directed care.            Mobility        STG - Patient will ambulate 50ft with FWW safely with Supervision (Progressing)       Start:  04/05/25    Expected End:  04/19/25               PT Transfers       STG - Patient will transfer sit to and from stand using fWW with safe technique and Supervision (Progressing)       Start:  04/05/25    Expected End:  04/19/25               Pain - Adult

## 2025-04-13 NOTE — PROGRESS NOTES
SUBJECTIVE     Pt requiring sitter due to impulsivity  Has been having visual hallucinations    4/7:              Today the patient is found awake alert and interactive appropriately sitting in the bedside chair.  Complains only of feeling tired and weak.  No acute events overnight.  Her Coreg was held yesterday due to bradycardia.  Continues today with heart rate ranging from 54-79.  Asymptomatic with this.  Blood pressure stable.  Otherwise denies interval new symptoms or complaints including chest pain palpitations pleuritic type pain unusual shortness of breath cough sputum nausea abdominal pain flank pain fever or chills.    4/8:              Today the patient remains awake alert and interactive appropriately in bed.  No acute events overnight.  Voices no specific complaints.  Heart rate has remained mildly bradycardic and asymptomatic.  Coreg continues to be held.  Not requiring a sitter.  At this time awaiting authorization for SNF. Otherwise denies interval new symptoms or complaints including chest pain palpitations pleuritic type pain unusual shortness of breath cough sputum nausea abdominal pain flank pain fever or chills    4/9:               Today the patient once again is awake alert and interactive appropriately in bed.  Specific complaints and no acute events overnight.  Heart rate appears improved in the normal range today.  Blood pressures trending up and may need to resume Coreg.  Continuing to await authorization for SNF. Otherwise denies interval new symptoms or complaints including chest pain palpitations pleuritic type pain unusual shortness of breath cough sputum nausea abdominal pain flank pain fever or chills    4/10:               Today the patient is found sleeping but does awaken to name and exam.  Voices no specific complaints and no acute events overnight.  Continues with good heart rate and blood pressure levels.  Notified of insurance denial for SNF and awaiting appeal which likely  "will be on the weekend.  Otherwise denies interval new symptoms or complaints including chest pain palpitations pleuritic type pain unusual shortness of breath cough sputum nausea abdominal pain flank pain fever or chills    4/11:               Today patient is seen in the bedside chair more awake alert and interactive appropriately.  Voices no specific complaints and no acute complaints.  Today continues with mild asymptomatic bradycardia.  Continues to do well on room air.  Blood pressure normal and stable.  Continuing at this point to await appeal for SNF. Otherwise denies interval new symptoms or complaints including chest pain palpitations pleuritic type pain unusual shortness of breath cough sputum nausea abdominal pain flank pain fever or chills    4/12:               Today patient seen in bed and remains awake alert and interactive appropriately.  Voices no specific complaints and no acute events overnight.  Continues on room air.  Vital signs otherwise normal and stable. Continuing at this point to await appeal for SNF. Otherwise denies interval new symptoms or complaints including chest pain palpitations pleuritic type pain unusual shortness of breath cough sputum nausea abdominal pain flank pain fever or chills    4/13:                Today patient seen once again in bed.  Sleeping but awakens easily to name and exam.  Interactive at her baseline otherwise.  Voices no specific complaints and no acute events overnight.  Continuing to await appeal for SNF.  Otherwise denies interval new symptoms or complaints including chest pain palpitations pleuritic type pain unusual shortness of breath cough sputum nausea abdominal pain flank pain fever or chills      OBJECTIVE:       Physical Exam  /62 (BP Location: Right arm, Patient Position: Lying)   Pulse 59   Temp 35.9 °C (96.7 °F) (Temporal)   Resp 18   Ht 1.651 m (5' 5\")   Wt 74.8 kg (164 lb 14.5 oz)   SpO2 97%   BMI 27.44 kg/m²   Body mass index is " "27.44 kg/m².    GEN - NAD, slender elderly  female awake alert and interactive appropriately  PULM - CTA  CVS - RRR, normal rate, 2/6 murmur  ABD - soft and nontender  EXTR - trace bilat LE edema  Psych: Pleasant and cooperative to exam    Scheduled medications  aspirin, 81 mg, oral, Daily  atorvastatin, 20 mg, oral, Nightly  [Held by provider] carvedilol, 3.125 mg, oral, BID  enoxaparin, 30 mg, subcutaneous, q24h  lisinopril, 10 mg, oral, Daily  pantoprazole, 40 mg, oral, Daily before breakfast   Or  pantoprazole, 40 mg, intravenous, Daily before breakfast      Continuous medications     PRN medications  PRN medications: acetaminophen, guaiFENesin, LORazepam, melatonin, ondansetron, polyethylene glycol    Labs  Results from last 7 days   Lab Units 04/08/25  0425   WBC AUTO x10*3/uL 6.0   HEMOGLOBIN g/dL 9.6*   HEMATOCRIT % 30.8*   PLATELETS AUTO x10*3/uL 260     Results from last 7 days   Lab Units 04/09/25  0449 04/08/25  0425   SODIUM mmol/L 137 138   POTASSIUM mmol/L 4.3 4.0   CHLORIDE mmol/L 106 107   CO2 mmol/L 25 25   BUN mg/dL 24* 22   CREATININE mg/dL 1.19* 1.29*   CALCIUM mg/dL 9.7 8.9   GLUCOSE mg/dL 80 88       Microbiology:   No results found for the last 90 days.                   No lab exists for component: \"AGALPCRNB\"   .ID  Lab Results   Component Value Date    URINECULTURE  04/05/2025     Growth indicates contamination with periurethral roly. Repeat culture if clinically indicated.         ASSESSMENT & PLAN:     1)  Fall/Debility  -  no fractures or acute injuries found during workup  - PT eval pending and will likely need SNF.  PT AMPAC is 16 - even if not qualifying for SNF it sounds like she will need placement due to presumed dementia and unsafe living independently  - sounds like family has 24/7 caregivers and has cameras in the home for safety  - no other acute metabolic or infectious etiologies found  4/7: AM-PAC 15.  Working towards SNF.  4/13: Initially denied by insurance.  " Transitional care pursuing appeal.    2)  Possible UTI  -  empiric Keflex started but culture suggests contamination >> will stop abx    3) CAD/HTN/bradycardia  -  resumed home meds  - has been bradycardic and will hold coreg  4/7: Continues today with mild asymptomatic bradycardia.    4/9: Heart rates primarily good range and normal today.  Blood pressure perhaps starting to trend up and may need to resume Coreg.  Continue to monitor.        Disposition  SNF authorization denied now awaiting appeal

## 2025-04-13 NOTE — CARE PLAN
The patient's goals for the shift include  free from injury and falls    The clinical goals for the shift include Pt will be free from falls

## 2025-04-13 NOTE — CARE PLAN
The patient's goals for the shift include      The clinical goals for the shift include Pt will be free from fall or injury throughout shift      Problem: Pain - Adult  Goal: Verbalizes/displays adequate comfort level or baseline comfort level  Outcome: Progressing     Problem: Safety - Adult  Goal: Free from fall injury  Outcome: Progressing     Problem: Discharge Planning  Goal: Discharge to home or other facility with appropriate resources  Outcome: Progressing     Problem: Chronic Conditions and Co-morbidities  Goal: Patient's chronic conditions and co-morbidity symptoms are monitored and maintained or improved  Outcome: Progressing     Problem: Nutrition  Goal: Nutrient intake appropriate for maintaining nutritional needs  Outcome: Progressing     Problem: Skin  Goal: Decreased wound size/increased tissue granulation at next dressing change  Outcome: Progressing  Flowsheets (Taken 4/12/2025 2008)  Decreased wound size/increased tissue granulation at next dressing change: Promote sleep for wound healing  Goal: Participates in plan/prevention/treatment measures  Outcome: Progressing  Flowsheets (Taken 4/12/2025 2008)  Participates in plan/prevention/treatment measures: Elevate heels  Goal: Prevent/manage excess moisture  Outcome: Progressing  Flowsheets (Taken 4/12/2025 2008)  Prevent/manage excess moisture: Moisturize dry skin  Goal: Prevent/minimize sheer/friction injuries  Outcome: Progressing  Flowsheets (Taken 4/12/2025 2008)  Prevent/minimize sheer/friction injuries: Use pull sheet  Goal: Promote/optimize nutrition  Outcome: Progressing  Flowsheets (Taken 4/12/2025 2008)  Promote/optimize nutrition: Monitor/record intake including meals  Goal: Promote skin healing  Outcome: Progressing  Flowsheets (Taken 4/12/2025 2008)  Promote skin healing:   Assess skin/pad under line(s)/device(s)   Rotate device position/do not position patient on device

## 2025-04-14 PROCEDURE — 2500000005 HC RX 250 GENERAL PHARMACY W/O HCPCS

## 2025-04-14 PROCEDURE — G0378 HOSPITAL OBSERVATION PER HR: HCPCS

## 2025-04-14 PROCEDURE — 2500000001 HC RX 250 WO HCPCS SELF ADMINISTERED DRUGS (ALT 637 FOR MEDICARE OP): Performed by: EMERGENCY MEDICINE

## 2025-04-14 PROCEDURE — 97530 THERAPEUTIC ACTIVITIES: CPT | Mod: GO,CO

## 2025-04-14 PROCEDURE — 97535 SELF CARE MNGMENT TRAINING: CPT | Mod: GO,CO

## 2025-04-14 PROCEDURE — 97116 GAIT TRAINING THERAPY: CPT | Mod: GP,CQ

## 2025-04-14 PROCEDURE — 96372 THER/PROPH/DIAG INJ SC/IM: CPT

## 2025-04-14 PROCEDURE — 2500000004 HC RX 250 GENERAL PHARMACY W/ HCPCS (ALT 636 FOR OP/ED)

## 2025-04-14 PROCEDURE — 99232 SBSQ HOSP IP/OBS MODERATE 35: CPT | Performed by: HOSPITALIST

## 2025-04-14 PROCEDURE — 2500000001 HC RX 250 WO HCPCS SELF ADMINISTERED DRUGS (ALT 637 FOR MEDICARE OP)

## 2025-04-14 PROCEDURE — 97110 THERAPEUTIC EXERCISES: CPT | Mod: GP,CQ

## 2025-04-14 RX ADMIN — Medication 3 MG: at 20:07

## 2025-04-14 RX ADMIN — ASPIRIN 81 MG: 81 TABLET, COATED ORAL at 08:05

## 2025-04-14 RX ADMIN — ENOXAPARIN SODIUM 30 MG: 30 INJECTION SUBCUTANEOUS at 15:30

## 2025-04-14 RX ADMIN — ATORVASTATIN CALCIUM 20 MG: 20 TABLET, FILM COATED ORAL at 20:07

## 2025-04-14 RX ADMIN — LISINOPRIL 10 MG: 10 TABLET ORAL at 08:05

## 2025-04-14 RX ADMIN — PANTOPRAZOLE SODIUM 40 MG: 40 TABLET, DELAYED RELEASE ORAL at 06:39

## 2025-04-14 ASSESSMENT — COGNITIVE AND FUNCTIONAL STATUS - GENERAL
CLIMB 3 TO 5 STEPS WITH RAILING: TOTAL
STANDING UP FROM CHAIR USING ARMS: A LITTLE
EATING MEALS: A LITTLE
DRESSING REGULAR LOWER BODY CLOTHING: A LITTLE
WALKING IN HOSPITAL ROOM: A LITTLE
DRESSING REGULAR LOWER BODY CLOTHING: A LOT
MOVING TO AND FROM BED TO CHAIR: A LITTLE
DRESSING REGULAR UPPER BODY CLOTHING: A LITTLE
PERSONAL GROOMING: A LITTLE
HELP NEEDED FOR BATHING: A LOT
MOBILITY SCORE: 15
MOVING TO AND FROM BED TO CHAIR: A LITTLE
TURNING FROM BACK TO SIDE WHILE IN FLAT BAD: A LITTLE
STANDING UP FROM CHAIR USING ARMS: A LITTLE
EATING MEALS: A LITTLE
MOBILITY SCORE: 18
HELP NEEDED FOR BATHING: A LITTLE
CLIMB 3 TO 5 STEPS WITH RAILING: TOTAL
WALKING IN HOSPITAL ROOM: A LOT
DAILY ACTIVITIY SCORE: 15
DAILY ACTIVITIY SCORE: 18
PERSONAL GROOMING: A LITTLE
TOILETING: A LITTLE
MOVING FROM LYING ON BACK TO SITTING ON SIDE OF FLAT BED WITH BEDRAILS: A LITTLE
TOILETING: A LOT
DRESSING REGULAR UPPER BODY CLOTHING: A LITTLE

## 2025-04-14 ASSESSMENT — PAIN - FUNCTIONAL ASSESSMENT
PAIN_FUNCTIONAL_ASSESSMENT: 0-10

## 2025-04-14 ASSESSMENT — PAIN SCALES - GENERAL
PAINLEVEL_OUTOF10: 0 - NO PAIN

## 2025-04-14 ASSESSMENT — ACTIVITIES OF DAILY LIVING (ADL): HOME_MANAGEMENT_TIME_ENTRY: 12

## 2025-04-14 NOTE — PROGRESS NOTES
Physical Therapy    Physical Therapy Treatment    Patient Name: Sylvia Moody  MRN: [id number]  Department: [facility] [address] [facility]  Room: 231/2311-B  Today's Date: 4/14/2025  Time Calculation  Start Time: 0833  Stop Time: 0900  Time Calculation (min): 27 min     Assessment/Plan   PT Assessment  PT Assessment Results: Decreased strength, Decreased endurance, Impaired balance, Impaired hearing  Barriers to Discharge Home: Caregiver assistance, Cognition needs, Physical needs  End of Session Communication: PCT/NA/CTA, Bedside nurse  Assessment Comment: Patient tolerated tx session well with progression towards increased gait trg from 40'x1 to 60'x1 this tx session requiring only CGA. Patient requires maximal directional VC from therapist 75% of the time. Patient will continue to progress towards goals and ADL's to return to PLOF.  PT Plan  Inpatient/Swing Bed or Outpatient: Inpatient  PT Plan  Treatment/Interventions: Transfer training, Gait training, Stair training, Balance training, Neuromuscular re-education, Strengthening, Endurance training, Therapeutic exercise, Therapeutic activity, Postural re-education, Positioning, Bed mobility  PT Plan: Ongoing PT  PT Frequency: 3 times per week  PT Discharge Recommendations: Moderate intensity level of continued care    General Visit Information:   PT  Visit  PT Received On: 04/14/25  Response to Previous Treatment: Patient with no complaints from previous session.  General  Reason for Referral: Impaired Mobility  Past Medical History Relevant to Rehab: CAD s/p CABG, dyslipidemia, recent fall  Family/Caregiver Present: No  Prior to Session Communication: Bedside nurse  Patient Position Received: Up in chair, Alarm on  General Comment: Patient alert and agreeable to therapy session.    Subjective   Precautions:  Precautions  Hearing/Visual Limitations: Sycuan: no hearing aids in ED  Medical Precautions: Fall precautions    Objective   Pain:  Pain Assessment  Pain Assessment: 0-10  0-10  (Numeric) Pain Score: 0 - No pain  Cognition:  Cognition  Orientation Level: Disoriented to place, Disoriented to time, Disoriented to situation    Postural Control:  Static Sitting Balance  Static Sitting-Balance Support: Feet supported, Bilateral upper extremity supported  Static Sitting-Level of Assistance: Close supervision  Static Standing Balance  Static Standing-Balance Support: Bilateral upper extremity supported  Static Standing-Level of Assistance: Contact guard  Static Standing-Comment/Number of Minutes: Standing x1 min    Activity Tolerance:  Activity Tolerance  Endurance: Tolerates 10 - 20 min exercise with multiple rests  Treatments:  Therapeutic Exercise  Therapeutic Exercise Performed: Yes  Therapeutic Exercise Activity 1: Patient performed seated eric LE exercises; alternating hip flexion 2x10, hip abduction x10.    Ambulation/Gait Training  Ambulation/Gait Training Performed: Yes  Ambulation/Gait Training 1  Surface 1: Level tile  Device 1: Rolling walker  Assistance 1: Minimum assistance, Moderate verbal cues  Quality of Gait 1: Inconsistent stride length, Diminished heel strike, Forward flexed posture  Comments/Distance (ft) 1: Gait tr'x1 FWW. VC to facilitate proper breathing pattern, and shoulders back fwd gaze control. Patient requires directional cueing 75% of the time d/t cognitive deficit.  Transfers  Transfer: Yes  Transfer 1  Transfer From 1: Chair with arms to  Transfer to 1: Stand  Technique 1: Sit to stand  Transfer Device 1: Walker  Transfer Level of Assistance 1: Contact guard, Moderate verbal cues  Transfers 2  Transfer From 2: Stand to  Transfer to 2: Chair with arms  Technique 2: Stand to sit  Transfer Device 2: Walker  Transfer Level of Assistance 2: Moderate verbal cues, Minimum assistance    Outcome Measures:  [facility] Basic Mobility  Turning from your back to your side while in a flat bed without using bedrails: A little  Moving from lying on your back to sitting on the side  of a flat bed without using bedrails: A little  Moving to and from bed to chair (including a wheelchair): A little  Standing up from a chair using your arms (e.g. wheelchair or bedside chair): A little  To walk in hospital room: A lot  Climbing 3-5 steps with railing: Total  Basic Mobility - Total Score: 15    Education Documentation  Mobility Training, taught by Gianna D'Amico, S-PTA at 4/14/2025  9:59 AM.  Learner: Patient  Readiness: Acceptance  Method: Explanation  Response: Verbalizes Understanding    Precautions, taught by Gianna D'Amico, S-PTA at 4/14/2025  9:59 AM.  Learner: Patient  Readiness: Acceptance  Method: Explanation  Response: Verbalizes Understanding    ADL Training, taught by Gianna D'Amico, S-PTA at 4/14/2025  9:59 AM.  Learner: Patient  Readiness: Acceptance  Method: Explanation  Response: Verbalizes Understanding    Education Comments  No comments found.         Encounter Problems       Encounter Problems (Active)       Balance       STG - Maintains static standing balance without upper extremity support and Supervision (Progressing)       Start:  04/05/25    Expected End:  04/19/25       INTERVENTIONS:1. Practice standing with minimal support.2. Educate patient about maintaining total hip precautions while maintaining balance.3. Educate patient about standing tolerance.4. Educate patient about independence with gait, transfers, and ADL's.5. Educate patient about use of assistive device.6. Educate patient about self-directed care.            Mobility       STG - Patient will ambulate 50ft with FWW safely with Supervision (Progressing)       Start:  04/05/25    Expected End:  04/19/25               PT Transfers       STG - Patient will transfer sit to and from stand using fWW with safe technique and Supervision (Progressing)       Start:  04/05/25    Expected End:  04/19/25               Pain - Adult            Gianna D'Amico, S-PTA

## 2025-04-14 NOTE — PROGRESS NOTES
04/14/25 1700   Discharge Planning   Expected Discharge Disposition SNF     Spoke with daughter Anat who had the pt at her home prior to this hospital stay and was having HHC for her periodically throughout the day. She would do all her meal prep for her mother. And all anyone had to was heat it up. Pt was able to get herself to bed then and now she can't get out of a chair by herself. Pt can not be left alone. Daughter works out of town often and son is a physician who is unable to have her at his home. So the HHC option is not good until she is able to manage moving about safely without help. She was planning her having skilled care and applying for medicaid and if she needed it placing her for long-term and if not bringing her back home.The appeal at this time is still pending. [facility] 15/15 Care Transitions to follow. Deisi Ray BSN/RN-TCC

## 2025-04-14 NOTE — PROGRESS NOTES
Sylvia Johnson [id number]   Service: Internal Medicine / Hospitalist Date of service: 4/14/2025                          DNR and No Intubation                  Subjective      History Of Present Illness (HPI):  Sylvia Moody is a 95 y.o. female with PMHx s/f CAD s/p prior CABG, HTN, DLD presenting with unwitnessed fall. She is a poor historian and doesn't remember what happened, most history obtained by chart review.  Per ambulance record, EMS was requested for patient with a fall and had a leg injury.  On EMS arrival family stated that she had fallen and was able to get her up into a chair.  Her family member thought she may have a broken hip as her leg appeared outwardly rotated.  She is acting normal for her per family and is usually ANO x 2 at baseline.  She is unable to recount how she fell or how long she was down or if she hit her head.  Denies headache, neck pain, chest pain, SOB, dysuria, focal or lateralizing weakness.     ED Course:   Vitals on presentation: T 36.4 °C (97.5 °F)  HR 52  BP (!) 188/84  RR 18  O2 99 % None (Room air)  Labs:   CMP, CBC largely unremarkable  Lactate 0.6  Troponin 66 BNP 42  UA-leukocyte esterase 500, WBC 21-50  EKG: Sinus rhythm at 77 bpm, RBBB and LAFB.  , QTc 502.  Imaging - agree with radiology interpretation(s):   XR tib-fib left, pelvis-no acute osseous abnormality denies generative changes  CT head-, CT cervical spine-no acute intracranial abnormality.  Encephalomalacia in the right hemisphere similar compared to prior imaging with remote infarct.  Nonspecific scattered white matter hypodensities favoring sequela small vessel ischemia.  Cervical spondylosis.  XR chest-no acute abnormality  XR shoulder left-degenerative changes and osteopenia  Interventions: Tylenol 650 mg, aspirin 81 mg,  ml, started on Keflex 500 mg every 12 hours, admission for further management.    4/7:              Today the patient is found awake alert and interactive  appropriately sitting in the bedside chair.  Complains only of feeling tired and weak.  No acute events overnight.  Her Coreg was held yesterday due to bradycardia.  Continues today with heart rate ranging from 54-79.  Asymptomatic with this.  Blood pressure stable.  Otherwise denies interval new symptoms or complaints including chest pain palpitations pleuritic type pain unusual shortness of breath cough sputum nausea abdominal pain flank pain fever or chills.     4/8:              Today the patient remains awake alert and interactive appropriately in bed.  No acute events overnight.  Voices no specific complaints.  Heart rate has remained mildly bradycardic and asymptomatic.  Coreg continues to be held.  Not requiring a sitter.  At this time awaiting authorization for SNF. Otherwise denies interval new symptoms or complaints including chest pain palpitations pleuritic type pain unusual shortness of breath cough sputum nausea abdominal pain flank pain fever or chills     4/9:               Today the patient once again is awake alert and interactive appropriately in bed.  Specific complaints and no acute events overnight.  Heart rate appears improved in the normal range today.  Blood pressures trending up and may need to resume Coreg.  Continuing to await authorization for SNF. Otherwise denies interval new symptoms or complaints including chest pain palpitations pleuritic type pain unusual shortness of breath cough sputum nausea abdominal pain flank pain fever or chills     4/10:               Today the patient is found sleeping but does awaken to name and exam.  Voices no specific complaints and no acute events overnight.  Continues with good heart rate and blood pressure levels.  Notified of insurance denial for SNF and awaiting appeal which likely will be on the weekend.  Otherwise denies interval new symptoms or complaints including chest pain palpitations pleuritic type pain unusual shortness of breath cough  sputum nausea abdominal pain flank pain fever or chills     4/11:               Today patient is seen in the bedside chair more awake alert and interactive appropriately.  Voices no specific complaints and no acute complaints.  Today continues with mild asymptomatic bradycardia.  Continues to do well on room air.  Blood pressure normal and stable.  Continuing at this point to await appeal for SNF. Otherwise denies interval new symptoms or complaints including chest pain palpitations pleuritic type pain unusual shortness of breath cough sputum nausea abdominal pain flank pain fever or chills     4/12:               Today patient seen in bed and remains awake alert and interactive appropriately.  Voices no specific complaints and no acute events overnight.  Continues on room air.  Vital signs otherwise normal and stable. Continuing at this point to await appeal for SNF. Otherwise denies interval new symptoms or complaints including chest pain palpitations pleuritic type pain unusual shortness of breath cough sputum nausea abdominal pain flank pain fever or chills     4/13:                Today patient seen once again in bed.  Sleeping but awakens easily to name and exam.  Interactive at her baseline otherwise.  Voices no specific complaints and no acute events overnight.  Continuing to await appeal for SNF.  Otherwise denies interval new symptoms or complaints including chest pain palpitations pleuritic type pain unusual shortness of breath cough sputum nausea abdominal pain flank pain fever or chills    4/14................  Patient up in chair, disclose no acute complaints mentioned that overall she felt better today.No reported :fevers, chills, headaches, chest pains, nausea or vomiting    Review of Systems:   Review of system otherwise negative if not aforementioned above in subjective.        chills        Objective              Physical Exam     Constitutional:       Appearance: Patient appeared in no acute  cardiopulmonary distress.     Comments: Patient alert and oriented to person place  and situation it appeared.  HEENT:      Head: Normocephalic and atraumatic.Trachea midline      Nose:No observed congestion or rhinorrhea.     Mouth/Throat: Mucous membranes Moist, Trachea appeared  midline.  Eyes:      Extraocular Movements: Extraocular movements intact.      Pupils: Pupils are equal, round, and reactive to light.      Comments: No scleral icterus or conjunctival injection appreciated.   Cardiovascular:      Rate and Rhythm: Normal rate and regular rhythm. No clicks rubs or gallops, normal S1 and S2.No peripheral stigmata of endocarditis appreciated.  Comment: Murmur 2 out of 6     Pulmonary:      Lungs appeared clear to auscultation, no adventitious sound appreciated.  Abdominal:      General: Abdomen soft, nontender, active bowel sounds, no involuntary guarding or rebound tenderness appreciated.     Comments: None   Musculoskeletal:       No pitting edema or cyanosis appreciated.       Skin:     General: Skin is warm.      Coloration:  No jaundice     Findings: No abnormal appearing skin rashes or lesions that appeared acute noted on unclothed area of the skin..   Neurological:      General: No  new appearing focal sensory or motor deficits appreciated, no meningeal signs or dysmetria noted.   Cranial Nerves: Cranial nerves II to XII appearing grossly intact.  Comment: Patient with what appears to be chronic hearing difficulties however still appeared to have difficulties following the stream of thought, clinical suspicion for any chronic cognitive impairment.     Genitals:  Deferred  Psychiatric:         The patient appears to be displaying normal mood and affect at the time of evaluation.    Labs:     Lab Results   Component Value Date    GLUCOSE 80 04/09/2025    CALCIUM 9.7 04/09/2025     04/09/2025    K 4.3 04/09/2025    CO2 25 04/09/2025     04/09/2025    BUN 24 (H) 04/09/2025    CREATININE 1.19  (H) 04/09/2025      Lab Results   Component Value Date    WBC 6.0 04/08/2025    HGB 9.6 (L) 04/08/2025    HCT 30.8 (L) 04/08/2025    MCV 98 04/08/2025     04/08/2025      [unfilled]   [unfilled]   No results found for the last 90 days.              X-rays/ Images    [unfilled]   Radiology Results (last 21 days)    Procedure Component Value Units Date/Time   XR shoulder left 2+ views [[id number]] Collected: 04/04/25 2329   Order Status: Completed Updated: 04/05/25 0005   Narrative:     STUDY:  Shoulder Radiographs; 04/04/2025 11:15  INDICATION:  Left shoulder pain.  COMPARISON:  None available.  ACCESSION NUMBER(S):  [id number]  ORDERING CLINICIAN:  LIZBETH HAYES  TECHNIQUE:  Two view(s) of the left shoulder.  FINDINGS:    There is no displaced fracture.  There is osteopenia with advanced  degenerative changes of the glenohumeral joint and acromial clavicular  joint.  There is calcific tendinopathy supraspinatus.  There is  osteopenia.  Clavicle is intact.  There is soft tissue swelling.   Impression:     Advanced degenerative changes and osteopenia with calcific  tendinopathy supraspinatus tendon.  Signed by Tani Abraham DO   CT cervical spine wo IV contrast [[id number]] Collected: 04/04/25 2234   Order Status: Completed Updated: 04/04/25 2234   Narrative:     Interpreted By:  Finkelstein, Evan,  STUDY:  CT HEAD WO IV CONTRAST; CT CERVICAL SPINE WO IV CONTRAST;  4/4/2025  9:49 pm      INDICATION:  Signs/Symptoms:unwitnessed fall; Signs/Symptoms:unwithnessed fall.          COMPARISON:  CT brain 12/15/2021      ACCESSION NUMBER(S):  [id number]; [id number]      ORDERING CLINICIAN:  DOT HUSTON      TECHNIQUE:  Noncontrast CT images of the head with coronal and sagittal  reconstructions. Axial noncontrast CT images of the cervical spine  with coronal and sagittal reconstructed images.      FINDINGS:  EXTRACRANIAL SOFT TISSUES: Unremarkable.      CALVARIUM: No depressed skull fracture. No  destructive osseous lesion.      PARANASAL SINUSES/MASTOIDS: The visualized paranasal sinuses and  mastoid air cells are aerated.      HEMORRHAGE: No acute intracranial hemorrhage.      BRAIN PARENCHYMA: Gray-white matter interfaces are preserved. No mass  effect or midline shift. Encephalomalacia in the right hemisphere  similar compared to prior imaging compatible with remote infarct.  Additional nonspecific scattered white matter hypodensities are again  seen.      VENTRICLES and EXTRA-AXIAL SPACES: Parenchymal atrophy with  prominence of the ventricles and cortical sulci.      OTHER FINDINGS: There are calcifications within the cavernous carotids      CERVICAL SPINE:      ALIGNMENT: Straightening of the normal cervical lordosis, which may  be positional or related to spasm. Grade 1 anterolisthesis C4 on C5  and C5 on C6. VERTEBRAE: No acute loss of vertebral body height.  Bones are demineralized. DISC SPACE: Disc space narrowing C5/C6 and  C6/C7. SPINAL CANAL: Multilevel facet and uncovertebral arthropathy  with varying degrees of neural foraminal stenosis. Posterior disc  osteophyte complexes are most pronounced at C5/C6 and C6/C7 and  contribute to moderate central narrowing. PREVERTEBRAL SOFT TISSUES:  No prevertebral soft tissue swelling. LUNG APICES: Imaged portion of  the lung apices are within normal limits.      OTHER FINDINGS: None.       Impression:         No acute intracranial hemorrhage, mass effect or midline shift.      Encephalomalacia in the right hemisphere similar compared to prior  imaging is compatible with remote infarct. Additional nonspecific  scattered white matter hypodensities favored to represent sequela of  small vessel ischemia. Cervical spondylosis without acute loss of  vertebral body height or traumatic malalignment.          MACRO:  None.      Signed by: Evan Finkelstein 4/4/2025 10:33 PM  Dictation workstation:   [id number]   CT head wo IV contrast [[id number]] Collected:  04/04/25 2234   Order Status: Completed Updated: 04/04/25 2234   Narrative:     Interpreted By:  Finkelstein, Evan,  STUDY:  CT HEAD WO IV CONTRAST; CT CERVICAL SPINE WO IV CONTRAST;  4/4/2025  9:49 pm      INDICATION:  Signs/Symptoms:unwitnessed fall; Signs/Symptoms:unwithnessed fall.          COMPARISON:  CT brain 12/15/2021      ACCESSION NUMBER(S):  [id number]; [id number]      ORDERING CLINICIAN:  DOT HUSTON      TECHNIQUE:  Noncontrast CT images of the head with coronal and sagittal  reconstructions. Axial noncontrast CT images of the cervical spine  with coronal and sagittal reconstructed images.      FINDINGS:  EXTRACRANIAL SOFT TISSUES: Unremarkable.      CALVARIUM: No depressed skull fracture. No destructive osseous lesion.      PARANASAL SINUSES/MASTOIDS: The visualized paranasal sinuses and  mastoid air cells are aerated.      HEMORRHAGE: No acute intracranial hemorrhage.      BRAIN PARENCHYMA: Gray-white matter interfaces are preserved. No mass  effect or midline shift. Encephalomalacia in the right hemisphere  similar compared to prior imaging compatible with remote infarct.  Additional nonspecific scattered white matter hypodensities are again  seen.      VENTRICLES and EXTRA-AXIAL SPACES: Parenchymal atrophy with  prominence of the ventricles and cortical sulci.      OTHER FINDINGS: There are calcifications within the cavernous carotids      CERVICAL SPINE:      ALIGNMENT: Straightening of the normal cervical lordosis, which may  be positional or related to spasm. Grade 1 anterolisthesis C4 on C5  and C5 on C6. VERTEBRAE: No acute loss of vertebral body height.  Bones are demineralized. DISC SPACE: Disc space narrowing C5/C6 and  C6/C7. SPINAL CANAL: Multilevel facet and uncovertebral arthropathy  with varying degrees of neural foraminal stenosis. Posterior disc  osteophyte complexes are most pronounced at C5/C6 and C6/C7 and  contribute to moderate central narrowing. PREVERTEBRAL SOFT  TISSUES:  No prevertebral soft tissue swelling. LUNG APICES: Imaged portion of  the lung apices are within normal limits.      OTHER FINDINGS: None.       Impression:         No acute intracranial hemorrhage, mass effect or midline shift.      Encephalomalacia in the right hemisphere similar compared to prior  imaging is compatible with remote infarct. Additional nonspecific  scattered white matter hypodensities favored to represent sequela of  small vessel ischemia. Cervical spondylosis without acute loss of  vertebral body height or traumatic malalignment.          MACRO:  None.      Signed by: Evan Finkelstein 4/4/2025 10:33 PM  Dictation workstation:   [id number]   XR chest 2 views [[id number]] Collected: 04/04/25 2308   Order Status: Completed Updated: 04/04/25 2315   Narrative:     STUDY:  Chest Radiographs;  4/4/2025 9:37PM  INDICATION:  Fall.  COMPARISON:  XR Chest: 2/13/2023  ACCESSION NUMBER(S):  [id number]  ORDERING CLINICIAN:  DOT HUSTON  TECHNIQUE:  Frontal and lateral chest.  FINDINGS:  CARDIOMEDIASTINAL SILHOUETTE:  Cardiomediastinal silhouette is mildly prominent and unchanged..     LUNGS:  Lungs are clear.     ABDOMEN:  No remarkable upper abdominal findings.     BONES:  No acute osseous changes.  Advanced degenerative changes are  demonstrated of both shoulder joints.   Impression:     No acute abnormality.  Signed by Rush Martin DO   XR tibia fibula left 2 views [[id number]] Collected: 04/04/25 2158   Order Status: Completed Updated: 04/04/25 2204   Narrative:     STUDY:  Tibia and Fibula Radiographs; 04/004/2025 9:36 PM  INDICATION:  Fall.  COMPARISON:  None.  ACCESSION NUMBER(S):  [id number]  ORDERING CLINICIAN:  DOT HUSTON  TECHNIQUE:  Two view(s) of the left tibia and fibula.  FINDINGS:    There is no displaced fracture.  The alignment is anatomic.  No soft  tissue abnormality is seen. There are degenerative changes of the knee  and ankle joint   Impression:      No acute osseous abnormality.  Degenerative changes.  Signed by Davin Doyle MD   XR pelvis 1-2 views [[id number]] Collected: 04/04/25 2159   Order Status: Completed Updated: 04/04/25 2205   Narrative:     STUDY:  Pelvis Radiographs; 04/04/2025 9:36 PM  INDICATION:  Fall.  COMPARISON:  XR right hip pelvis 12/15/2021.  ACCESSION NUMBER(S):  [id number]  ORDERING CLINICIAN:  DOT HUSTON  TECHNIQUE:  Single view of the pelvis.  FINDINGS:    The pelvic ring is intact.  There is no acute fracture. Bilateral  degenerative changes of the hips.   Impression:     No acute osseous abnormality.  Bilateral degenerative changes of the hips..  Signed by Davin Doyle MD             Medical Problems       Problem List       * (Principal) Generalized weakness    Arteriosclerotic coronary artery disease    Benign essential hypertension    Hyperlipidemia    Volvulus of colon (Multi)    Vascular insufficiency of intestine (Multi)    Sensorineural hearing loss (SNHL) of both ears               Above medical problems may be reflective of historical medical problems that may have resolved and may not related to acute clinical condition/medical problems.    Clinical impression/plan:  Expand All Collapse All    SUBJECTIVE      Pt requiring sitter due to impulsivity  Has been having visual hallucinations     4/7:              Today the patient is found awake alert and interactive appropriately sitting in the bedside chair.  Complains only of feeling tired and weak.  No acute events overnight.  Her Coreg was held yesterday due to bradycardia.  Continues today with heart rate ranging from 54-79.  Asymptomatic with this.  Blood pressure stable.  Otherwise denies interval new symptoms or complaints including chest pain palpitations pleuritic type pain unusual shortness of breath cough sputum nausea abdominal pain flank pain fever or chills.     4/8:              Today the patient remains awake alert and interactive appropriately  in bed.  No acute events overnight.  Voices no specific complaints.  Heart rate has remained mildly bradycardic and asymptomatic.  Coreg continues to be held.  Not requiring a sitter.  At this time awaiting authorization for SNF. Otherwise denies interval new symptoms or complaints including chest pain palpitations pleuritic type pain unusual shortness of breath cough sputum nausea abdominal pain flank pain fever or chills     4/9:               Today the patient once again is awake alert and interactive appropriately in bed.  Specific complaints and no acute events overnight.  Heart rate appears improved in the normal range today.  Blood pressures trending up and may need to resume Coreg.  Continuing to await authorization for SNF. Otherwise denies interval new symptoms or complaints including chest pain palpitations pleuritic type pain unusual shortness of breath cough sputum nausea abdominal pain flank pain fever or chills     4/10:               Today the patient is found sleeping but does awaken to name and exam.  Voices no specific complaints and no acute events overnight.  Continues with good heart rate and blood pressure levels.  Notified of insurance denial for SNF and awaiting appeal which likely will be on the weekend.  Otherwise denies interval new symptoms or complaints including chest pain palpitations pleuritic type pain unusual shortness of breath cough sputum nausea abdominal pain flank pain fever or chills     4/11:               Today patient is seen in the bedside chair more awake alert and interactive appropriately.  Voices no specific complaints and no acute complaints.  Today continues with mild asymptomatic bradycardia.  Continues to do well on room air.  Blood pressure normal and stable.  Continuing at this point to await appeal for SNF. Otherwise denies interval new symptoms or complaints including chest pain palpitations pleuritic type pain unusual shortness of breath cough sputum nausea  "abdominal pain flank pain fever or chills     4/12:               Today patient seen in bed and remains awake alert and interactive appropriately.  Voices no specific complaints and no acute events overnight.  Continues on room air.  Vital signs otherwise normal and stable. Continuing at this point to await appeal for SNF. Otherwise denies interval new symptoms or complaints including chest pain palpitations pleuritic type pain unusual shortness of breath cough sputum nausea abdominal pain flank pain fever or chills     4/13:                Today patient seen once again in bed.  Sleeping but awakens easily to name and exam.  Interactive at her baseline otherwise.  Voices no specific complaints and no acute events overnight.  Continuing to await appeal for SNF.  Otherwise denies interval new symptoms or complaints including chest pain palpitations pleuritic type pain unusual shortness of breath cough sputum nausea abdominal pain flank pain fever or chills        OBJECTIVE:         Physical Exam  /62 (BP Location: Right arm, Patient Position: Lying)   Pulse 59   Temp 35.9 °C (96.7 °F) (Temporal)   Resp 18   Ht 1.651 m (5' 5\")   Wt 74.8 kg (164 lb 14.5 oz)   SpO2 97%   BMI 27.44 kg/m²   Body mass index is 27.44 kg/m².     GEN - NAD, slender elderly  female awake alert and interactive appropriately  PULM - CTA  CVS - RRR, normal rate, 2/6 murmur  ABD - soft and nontender  EXTR - trace bilat LE edema  Psych: Pleasant and cooperative to exam     Scheduled medications    Scheduled Medications   aspirin, 81 mg, oral, Daily  atorvastatin, 20 mg, oral, Nightly  [Held by provider] carvedilol, 3.125 mg, oral, BID  enoxaparin, 30 mg, subcutaneous, q24h  lisinopril, 10 mg, oral, Daily  pantoprazole, 40 mg, oral, Daily before breakfast   Or  pantoprazole, 40 mg, intravenous, Daily before breakfast         Continuous medications  Continuous Medications         PRN medications  PRN Medications   PRN " "medications: acetaminophen, guaiFENesin, LORazepam, melatonin, ondansetron, polyethylene glycol        Labs       Results from last 7 days   Lab Units 04/08/25  0425   WBC AUTO x10*3/uL 6.0   HEMOGLOBIN g/dL 9.6*   HEMATOCRIT % 30.8*   PLATELETS AUTO x10*3/uL 260            Results from last 7 days   Lab Units 04/09/25  0449 04/08/25  0425   SODIUM mmol/L 137 138   POTASSIUM mmol/L 4.3 4.0   CHLORIDE mmol/L 106 107   CO2 mmol/L 25 25   BUN mg/dL 24* 22   CREATININE mg/dL 1.19* 1.29*   CALCIUM mg/dL 9.7 8.9   GLUCOSE mg/dL 80 88            Microbiology:   No results found for the last 90 days.                     No lab exists for component: \"AGALPCRNB\"   .ID         Lab Results   Component Value Date     URINECULTURE   04/05/2025       Growth indicates contamination with periurethral roly. Repeat culture if clinically indicated.            ASSESSMENT & PLAN:      1)  Fall/Debility  -  no fractures or acute injuries found during workup  - PT eval pending and will likely need SNF.  PT AMPAC is 16 - even if not qualifying for SNF it sounds like she will need placement due to presumed dementia and unsafe living independently  - sounds like family has 24/7 caregivers and has cameras in the home for safety  - no other acute metabolic or infectious etiologies found  4/7: AM-PAC 15.  Working towards SNF.  4/13: Initially denied by insurance.  Transitional care pursuing appeal.     2)  Possible UTI  -  empiric Keflex started but culture suggests contamination >> will stop abx     3) CAD/HTN/bradycardia  -  resumed home meds  - has been bradycardic and will hold coreg  4/7: Continues today with mild asymptomatic bradycardia.    4/9: Heart rates primarily good range and normal today.  Blood pressure perhaps starting to trend up and may need to resume Coreg.  Continue to monitor.            Disposition/additional care plan/interventions:/14/2025    Patient with advanced age despite appearing stable at this juncture appear to " remained at risk for clinical decline and deterioration.    Will discussed with TCC concerning discharge disposition/planning.    Continue to monitor off antibiotics.    Bradycardia resolved................ Monitor off Coreg    CAD continue current longitudinal care    Monitor blood pressure at risk for rebound hypertension with antihypertensive therapy on hold secondary to bradycardia,.    BMP in a.m. consider possible 15 mg of lisinopril daily if blood pressure continue to escalate, swings in BPs with systolic blood pressure in the 100 range so we will monitor closely.      . Further recommendations forthcoming in accordance with patient's clinical disposition and response to care.    Discharge planning:Discharge barrier awaiting discussion with family and TCC concerning discharge options.                   Dictation performed with assistance of voice recognition device therefore transcription errors are possible.

## 2025-04-14 NOTE — PROGRESS NOTES
Occupational Therapy    OT Treatment    Patient Name: Sylvia Moody  MRN: [id number]  Department: [facility]  Room: 2311/2311-B  Today's Date: 4/14/2025  Time Calculation  Start Time: 1130  Stop Time: 1154  Time Calculation (min): 24 min        Assessment:  Barriers to Discharge Home: Caregiver assistance  End of Session Communication: Bedside nurse  End of Session Patient Position: Up in chair, Alarm on     Plan:  Treatment Interventions: ADL retraining, Functional transfer training, UE strengthening/ROM, Endurance training, Patient/family training, Cognitive reorientation, Neuromuscular reeducation, Equipment evaluation/education  OT Frequency: 3 times per week  OT Discharge Recommendations: Moderate intensity level of continued care  Equipment Recommended upon Discharge: Other (comment) (TBD)  OT - OK to Discharge: Yes  Treatment Interventions: ADL retraining, Functional transfer training, UE strengthening/ROM, Endurance training, Patient/family training, Cognitive reorientation, Neuromuscular reeducation, Equipment evaluation/education    Subjective   Previous Visit Info:  OT Last Visit  OT Received On: 04/14/25  General:  General  Prior to Session Communication: PCT/NA/CTA  Patient Position Received: Up in chair, Alarm on  General Comment: pt. showing some improvement with functional mobility. Transfers MIN A, LE dressing MOD A,  grooming CGA.  No loss of balance with functional ADLS, Pt. completed BUE ex. seated    Pain Assessment  Pain Assessment: 0-10  0-10 (Numeric) Pain Score: 0 - No pain    Objective    Cognition:  Cognition  Orientation Level: Disoriented to place, Disoriented to time, Disoriented to situation  Coordination:     Activities of Daily Living: Feeding  Feeding Level of Assistance: Setup  Feeding Where Assessed: Chair    Grooming  Grooming Level of Assistance: Contact guard  Grooming Where Assessed: Standing sinkside  Grooming Comments: hand hygiene      LE Dressing  LE Dressing: Yes  Adult  Briefs Level of Assistance: Moderate assistance  LE Dressing Where Assessed: Toilet  LE Dressing Comments: pt. needed assist getting LE into brief    Toileting  Toileting Level of Assistance: Contact guard, Minimum assistance  Where Assessed: Toilet  Toileting Comments: hygiene  Functional Standing Tolerance:     Bed Mobility/Transfers:      Transfers  Transfer: Yes  Transfer 1  Transfer From 1: Chair with arms to  Transfer to 1: Toilet  Technique 1: Sit to stand, Stand to sit  Transfer Device 1: Walker  Transfer Level of Assistance 1: Contact guard  Transfers 2  Transfer From 2: Toilet to  Transfer to 2: Chair with arms  Technique 2: Sit to stand, Stand to sit  Transfer Device 2: Walker  Transfer Level of Assistance 2: Minimum assistance, Minimal verbal cues  Trials/Comments 2: cues for hand placement       Therapy/Activity: Therapeutic Exercise  Therapeutic Exercise Performed: Yes  Therapeutic Exercise Activity 1: pt. completed BUE UE's shoulder flexion/ext, horizontal adduction, chest press 1x10 reps.            Outcome Measures:[facility] Daily Activity  Putting on and taking off regular lower body clothing: A lot  Bathing (including washing, rinsing, drying): A lot  Putting on and taking off regular upper body clothing: A little  Toileting, which includes using toilet, bedpan or urinal: A lot  Taking care of personal grooming such as brushing teeth: A little  Eating Meals: A little  Daily Activity - Total Score: 15        Education Documentation  Precautions, taught by LYLE Ybarra at 4/14/2025 12:32 PM.  Learner: Patient  Readiness: Acceptance  Method: Explanation  Response: Verbalizes Understanding, Needs Reinforcement    ADL Training, taught by LYLE Ybarra at 4/14/2025 12:32 PM.  Learner: Patient  Readiness: Acceptance  Method: Explanation  Response: Verbalizes Understanding, Needs Reinforcement    Education Comments  No comments found.        OP EDUCATION:       Goals:  Encounter  Problems       Encounter Problems (Active)       ADLs       Patient will perform UB and LB bathing with minimal assist  level of assistance and PRN DME/AE. (Progressing)       Start:  04/05/25    Expected End:  04/18/25            Patient with complete upper body dressing with set-up level of assistance donning and doffing all UE clothes with PRN adaptive equipment  (Progressing)       Start:  04/05/25    Expected End:  04/18/25            Patient with complete lower body dressing with supervision level of assistance donning and doffing all LE clothes  with PRN adaptive equipment  (Progressing)       Start:  04/05/25    Expected End:  04/18/25            Patient will feed self with modified independent level of assistance and  using PRN adaptive equipment. (Progressing)       Start:  04/05/25    Expected End:  04/18/25            Patient will complete daily grooming tasks brushing teeth and washing face/hair with set-up level of assistance and PRN adaptive equipment . (Progressing)       Start:  04/05/25    Expected End:  04/18/25            Patient will complete toileting including hygiene clothing management/hygiene with stand by assist level of assistance. (Progressing)       Start:  04/05/25    Expected End:  04/18/25

## 2025-04-15 LAB
ANION GAP SERPL CALC-SCNC: 12 MMOL/L (ref 10–20)
BUN SERPL-MCNC: 29 MG/DL (ref 6–23)
CALCIUM SERPL-MCNC: 9.8 MG/DL (ref 8.6–10.3)
CHLORIDE SERPL-SCNC: 108 MMOL/L (ref 98–107)
CO2 SERPL-SCNC: 22 MMOL/L (ref 21–32)
CREAT SERPL-MCNC: 1.4 MG/DL (ref 0.5–1.05)
EGFRCR SERPLBLD CKD-EPI 2021: 35 ML/MIN/1.73M*2
GLUCOSE SERPL-MCNC: 100 MG/DL (ref 74–99)
POTASSIUM SERPL-SCNC: 4.7 MMOL/L (ref 3.5–5.3)
SODIUM SERPL-SCNC: 137 MMOL/L (ref 136–145)

## 2025-04-15 PROCEDURE — 96372 THER/PROPH/DIAG INJ SC/IM: CPT

## 2025-04-15 PROCEDURE — 2500000004 HC RX 250 GENERAL PHARMACY W/ HCPCS (ALT 636 FOR OP/ED)

## 2025-04-15 PROCEDURE — 99232 SBSQ HOSP IP/OBS MODERATE 35: CPT | Performed by: HOSPITALIST

## 2025-04-15 PROCEDURE — 80048 BASIC METABOLIC PNL TOTAL CA: CPT | Performed by: HOSPITALIST

## 2025-04-15 PROCEDURE — 2500000001 HC RX 250 WO HCPCS SELF ADMINISTERED DRUGS (ALT 637 FOR MEDICARE OP): Performed by: HOSPITALIST

## 2025-04-15 PROCEDURE — G0378 HOSPITAL OBSERVATION PER HR: HCPCS

## 2025-04-15 PROCEDURE — 36415 COLL VENOUS BLD VENIPUNCTURE: CPT | Performed by: HOSPITALIST

## 2025-04-15 PROCEDURE — 2500000001 HC RX 250 WO HCPCS SELF ADMINISTERED DRUGS (ALT 637 FOR MEDICARE OP)

## 2025-04-15 PROCEDURE — 2500000001 HC RX 250 WO HCPCS SELF ADMINISTERED DRUGS (ALT 637 FOR MEDICARE OP): Performed by: EMERGENCY MEDICINE

## 2025-04-15 RX ORDER — HYDRALAZINE HYDROCHLORIDE 10 MG/1
10 TABLET, FILM COATED ORAL DAILY
Status: DISCONTINUED | OUTPATIENT
Start: 2025-04-15 | End: 2025-04-21

## 2025-04-15 RX ADMIN — ATORVASTATIN CALCIUM 20 MG: 20 TABLET, FILM COATED ORAL at 20:31

## 2025-04-15 RX ADMIN — PANTOPRAZOLE SODIUM 40 MG: 40 TABLET, DELAYED RELEASE ORAL at 06:22

## 2025-04-15 RX ADMIN — HYDRALAZINE HYDROCHLORIDE 10 MG: 10 TABLET ORAL at 10:39

## 2025-04-15 RX ADMIN — LISINOPRIL 10 MG: 10 TABLET ORAL at 07:37

## 2025-04-15 RX ADMIN — ENOXAPARIN SODIUM 30 MG: 30 INJECTION SUBCUTANEOUS at 16:04

## 2025-04-15 RX ADMIN — ASPIRIN 81 MG: 81 TABLET, COATED ORAL at 07:37

## 2025-04-15 ASSESSMENT — PAIN SCALES - GENERAL
PAINLEVEL_OUTOF10: 0 - NO PAIN

## 2025-04-15 ASSESSMENT — PAIN - FUNCTIONAL ASSESSMENT
PAIN_FUNCTIONAL_ASSESSMENT: 0-10

## 2025-04-15 ASSESSMENT — COGNITIVE AND FUNCTIONAL STATUS - GENERAL
DRESSING REGULAR UPPER BODY CLOTHING: A LOT
DRESSING REGULAR LOWER BODY CLOTHING: A LITTLE
PERSONAL GROOMING: A LOT
STANDING UP FROM CHAIR USING ARMS: A LITTLE
CLIMB 3 TO 5 STEPS WITH RAILING: A LITTLE
MOBILITY SCORE: 20
TOILETING: A LOT
MOVING TO AND FROM BED TO CHAIR: A LITTLE
HELP NEEDED FOR BATHING: A LITTLE
WALKING IN HOSPITAL ROOM: A LITTLE
EATING MEALS: A LITTLE
DAILY ACTIVITIY SCORE: 15

## 2025-04-15 NOTE — CARE PLAN
Problem: Pain - Adult  Goal: Verbalizes/displays adequate comfort level or baseline comfort level  Outcome: Progressing     Problem: Safety - Adult  Goal: Free from fall injury  Outcome: Progressing     Problem: Discharge Planning  Goal: Discharge to home or other facility with appropriate resources  Outcome: Progressing     Problem: Chronic Conditions and Co-morbidities  Goal: Patient's chronic conditions and co-morbidity symptoms are monitored and maintained or improved  Outcome: Progressing     Problem: Nutrition  Goal: Nutrient intake appropriate for maintaining nutritional needs  Outcome: Progressing     Problem: Skin  Goal: Decreased wound size/increased tissue granulation at next dressing change  Outcome: Progressing  Goal: Participates in plan/prevention/treatment measures  Outcome: Progressing  Goal: Prevent/manage excess moisture  Outcome: Progressing  Goal: Prevent/minimize sheer/friction injuries  Outcome: Progressing  Goal: Promote/optimize nutrition  Outcome: Progressing  Goal: Promote skin healing  Outcome: Progressing   The patient's goals for the shift include      The clinical goals for the shift include Pt will be free from fall or injury throughout shift    Over the shift, the patient did not make progress toward the following goals. Barriers to progression include . Recommendations to address these barriers include .

## 2025-04-15 NOTE — PROGRESS NOTES
Sylvia Johnson [id number]   Service: Internal Medicine / Hospitalist Date of service: 4/15/2025                                  DNR and No Intubation                        Subjective       History Of Present Illness (HPI):  Sylvia Moody is a 95 y.o. female with PMHx s/f CAD s/p prior CABG, HTN, DLD presenting with unwitnessed fall. She is a poor historian and doesn't remember what happened, most history obtained by chart review.  Per ambulance record, EMS was requested for patient with a fall and had a leg injury.  On EMS arrival family stated that she had fallen and was able to get her up into a chair.  Her family member thought she may have a broken hip as her leg appeared outwardly rotated.  She is acting normal for her per family and is usually ANO x 2 at baseline.  She is unable to recount how she fell or how long she was down or if she hit her head.  Denies headache, neck pain, chest pain, SOB, dysuria, focal or lateralizing weakness.     ED Course:   Vitals on presentation: T 36.4 °C (97.5 °F)  HR 52  BP (!) 188/84  RR 18  O2 99 % None (Room air)  Labs:   CMP, CBC largely unremarkable  Lactate 0.6  Troponin 66 BNP 42  UA-leukocyte esterase 500, WBC 21-50  EKG: Sinus rhythm at 77 bpm, RBBB and LAFB.  , QTc 502.  Imaging - agree with radiology interpretation(s):   XR tib-fib left, pelvis-no acute osseous abnormality denies generative changes  CT head-, CT cervical spine-no acute intracranial abnormality.  Encephalomalacia in the right hemisphere similar compared to prior imaging with remote infarct.  Nonspecific scattered white matter hypodensities favoring sequela small vessel ischemia.  Cervical spondylosis.  XR chest-no acute abnormality  XR shoulder left-degenerative changes and osteopenia  Interventions: Tylenol 650 mg, aspirin 81 mg,  ml, started on Keflex 500 mg every 12 hours, admission for further management.     4/7:              Today the patient is found awake alert and  interactive appropriately sitting in the bedside chair.  Complains only of feeling tired and weak.  No acute events overnight.  Her Coreg was held yesterday due to bradycardia.  Continues today with heart rate ranging from 54-79.  Asymptomatic with this.  Blood pressure stable.  Otherwise denies interval new symptoms or complaints including chest pain palpitations pleuritic type pain unusual shortness of breath cough sputum nausea abdominal pain flank pain fever or chills.     4/8:              Today the patient remains awake alert and interactive appropriately in bed.  No acute events overnight.  Voices no specific complaints.  Heart rate has remained mildly bradycardic and asymptomatic.  Coreg continues to be held.  Not requiring a sitter.  At this time awaiting authorization for SNF. Otherwise denies interval new symptoms or complaints including chest pain palpitations pleuritic type pain unusual shortness of breath cough sputum nausea abdominal pain flank pain fever or chills     4/9:               Today the patient once again is awake alert and interactive appropriately in bed.  Specific complaints and no acute events overnight.  Heart rate appears improved in the normal range today.  Blood pressures trending up and may need to resume Coreg.  Continuing to await authorization for SNF. Otherwise denies interval new symptoms or complaints including chest pain palpitations pleuritic type pain unusual shortness of breath cough sputum nausea abdominal pain flank pain fever or chills     4/10:               Today the patient is found sleeping but does awaken to name and exam.  Voices no specific complaints and no acute events overnight.  Continues with good heart rate and blood pressure levels.  Notified of insurance denial for SNF and awaiting appeal which likely will be on the weekend.  Otherwise denies interval new symptoms or complaints including chest pain palpitations pleuritic type pain unusual shortness of  breath cough sputum nausea abdominal pain flank pain fever or chills     4/11:               Today patient is seen in the bedside chair more awake alert and interactive appropriately.  Voices no specific complaints and no acute complaints.  Today continues with mild asymptomatic bradycardia.  Continues to do well on room air.  Blood pressure normal and stable.  Continuing at this point to await appeal for SNF. Otherwise denies interval new symptoms or complaints including chest pain palpitations pleuritic type pain unusual shortness of breath cough sputum nausea abdominal pain flank pain fever or chills     4/12:               Today patient seen in bed and remains awake alert and interactive appropriately.  Voices no specific complaints and no acute events overnight.  Continues on room air.  Vital signs otherwise normal and stable. Continuing at this point to await appeal for SNF. Otherwise denies interval new symptoms or complaints including chest pain palpitations pleuritic type pain unusual shortness of breath cough sputum nausea abdominal pain flank pain fever or chills     4/13:                Today patient seen once again in bed.  Sleeping but awakens easily to name and exam.  Interactive at her baseline otherwise.  Voices no specific complaints and no acute events overnight.  Continuing to await appeal for SNF.  Otherwise denies interval new symptoms or complaints including chest pain palpitations pleuritic type pain unusual shortness of breath cough sputum nausea abdominal pain flank pain fever or chills     4/14................  Patient up in chair, disclose no acute complaints mentioned that overall she felt better today.No reported :fevers, chills, headaches, chest pains, nausea or vomiting.    4/15...................Patient seen examined in the  presence of her nurse..No reported headaches, chest pains, dyspnea, nausea, vomiting, fevers or chills.     Review of Systems:   Review of system otherwise  negative if not aforementioned above in subjective.         chills           Objective        Physical Exam      Constitutional:       Appearance: Patient appeared in no acute cardiopulmonary distress.     Comments: Patient alert and oriented to person place  and situation  to some degree it appeared.Patient appeared nontoxic.  HEENT:      Head: Normocephalic and atraumatic.Trachea midline      Nose:No observed congestion or rhinorrhea.     Mouth/Throat: Mucous membranes Moist, Trachea appeared  midline.  Eyes:      Extraocular Movements: Extraocular movements intact.      Pupils: Pupils are equal, round, and reactive to light.      Comments: No scleral icterus or conjunctival injection appreciated.   Cardiovascular:      Rate and Rhythm: Normal rate and regular rhythm. No clicks rubs or gallops, normal S1 and S2.No peripheral stigmata of endocarditis appreciated.  Comment: Murmur 2 out of 6     Pulmonary:      Lungs appeared clear to auscultation, no adventitious sound appreciated.  Abdominal:      General: Abdomen soft, nontender, active bowel sounds, no involuntary guarding or rebound tenderness appreciated.     Comments: None   Musculoskeletal:       No pitting edema or cyanosis appreciated.       Skin:     General: Skin is warm.      Coloration:  No jaundice     Findings: No abnormal appearing skin rashes or lesions that appeared acute noted on unclothed area of the skin..   Neurological:      General: No  new appearing focal sensory or motor deficits appreciated, no meningeal signs or dysmetria noted.   Cranial Nerves: Cranial nerves II to XII appearing grossly intact.  Comment: Patient with what appears to be chronic hearing difficulties however still appeared to have difficulties following the stream of thought, clinical suspicion for any chronic cognitive impairment.     Genitals:  Deferred  Psychiatric:         The patient appears to be displaying normal mood and affect at the time of evaluation.      Labs:           Lab Results   Component Value Date     GLUCOSE 80 04/09/2025     CALCIUM 9.7 04/09/2025      04/09/2025     K 4.3 04/09/2025     CO2 25 04/09/2025      04/09/2025     BUN 24 (H) 04/09/2025     CREATININE 1.19 (H) 04/09/2025       Lab Results   Component Value Date    GLUCOSE 100 (H) 04/15/2025    CALCIUM 9.8 04/15/2025     04/15/2025    K 4.7 04/15/2025    CO2 22 04/15/2025     (H) 04/15/2025    BUN 29 (H) 04/15/2025    CREATININE 1.40 (H) 04/15/2025                                                  [unfilled]   [unfilled]   No results found for the last 90 days.                  X-rays/ Images     [unfilled]   Radiology Results (last 21 days)     Procedure Component Value Units Date/Time   XR shoulder left 2+ views [[id number]] Collected: 04/04/25 2329   Order Status: Completed Updated: 04/05/25 0005   Narrative:     STUDY:  Shoulder Radiographs; 04/04/2025 11:15  INDICATION:  Left shoulder pain.  COMPARISON:  None available.  ACCESSION NUMBER(S):  [id number]  ORDERING CLINICIAN:  LIZBETH HAYES  TECHNIQUE:  Two view(s) of the left shoulder.  FINDINGS:    There is no displaced fracture.  There is osteopenia with advanced  degenerative changes of the glenohumeral joint and acromial clavicular  joint.  There is calcific tendinopathy supraspinatus.  There is  osteopenia.  Clavicle is intact.  There is soft tissue swelling.   Impression:     Advanced degenerative changes and osteopenia with calcific  tendinopathy supraspinatus tendon.  Signed by Tani Abraham DO   CT cervical spine wo IV contrast [[id number]] Collected: 04/04/25 2234   Order Status: Completed Updated: 04/04/25 2234   Narrative:     Interpreted By:  Finkelstein, Evan,  STUDY:  CT HEAD WO IV CONTRAST; CT CERVICAL SPINE WO IV CONTRAST;  4/4/2025  9:49 pm      INDICATION:  Signs/Symptoms:unwitnessed fall; Signs/Symptoms:unwithnessed fall.          COMPARISON:  CT brain 12/15/2021      ACCESSION  NUMBER(S):  [id number]; [id number]      ORDERING CLINICIAN:  DOT HUSTON      TECHNIQUE:  Noncontrast CT images of the head with coronal and sagittal  reconstructions. Axial noncontrast CT images of the cervical spine  with coronal and sagittal reconstructed images.      FINDINGS:  EXTRACRANIAL SOFT TISSUES: Unremarkable.      CALVARIUM: No depressed skull fracture. No destructive osseous lesion.      PARANASAL SINUSES/MASTOIDS: The visualized paranasal sinuses and  mastoid air cells are aerated.      HEMORRHAGE: No acute intracranial hemorrhage.      BRAIN PARENCHYMA: Gray-white matter interfaces are preserved. No mass  effect or midline shift. Encephalomalacia in the right hemisphere  similar compared to prior imaging compatible with remote infarct.  Additional nonspecific scattered white matter hypodensities are again  seen.      VENTRICLES and EXTRA-AXIAL SPACES: Parenchymal atrophy with  prominence of the ventricles and cortical sulci.      OTHER FINDINGS: There are calcifications within the cavernous carotids      CERVICAL SPINE:      ALIGNMENT: Straightening of the normal cervical lordosis, which may  be positional or related to spasm. Grade 1 anterolisthesis C4 on C5  and C5 on C6. VERTEBRAE: No acute loss of vertebral body height.  Bones are demineralized. DISC SPACE: Disc space narrowing C5/C6 and  C6/C7. SPINAL CANAL: Multilevel facet and uncovertebral arthropathy  with varying degrees of neural foraminal stenosis. Posterior disc  osteophyte complexes are most pronounced at C5/C6 and C6/C7 and  contribute to moderate central narrowing. PREVERTEBRAL SOFT TISSUES:  No prevertebral soft tissue swelling. LUNG APICES: Imaged portion of  the lung apices are within normal limits.      OTHER FINDINGS: None.       Impression:         No acute intracranial hemorrhage, mass effect or midline shift.      Encephalomalacia in the right hemisphere similar compared to prior  imaging is compatible with  remote infarct. Additional nonspecific  scattered white matter hypodensities favored to represent sequela of  small vessel ischemia. Cervical spondylosis without acute loss of  vertebral body height or traumatic malalignment.          MACRO:  None.      Signed by: Evan Finkelstein 4/4/2025 10:33 PM  Dictation workstation:   [id number]   CT head wo IV contrast [[id number]] Collected: 04/04/25 2234   Order Status: Completed Updated: 04/04/25 2234   Narrative:     Interpreted By:  Finkelstein, Evan,  STUDY:  CT HEAD WO IV CONTRAST; CT CERVICAL SPINE WO IV CONTRAST;  4/4/2025  9:49 pm      INDICATION:  Signs/Symptoms:unwitnessed fall; Signs/Symptoms:unwithnessed fall.          COMPARISON:  CT brain 12/15/2021      ACCESSION NUMBER(S):  [id number]; [id number]      ORDERING CLINICIAN:  DOT HUSTON      TECHNIQUE:  Noncontrast CT images of the head with coronal and sagittal  reconstructions. Axial noncontrast CT images of the cervical spine  with coronal and sagittal reconstructed images.      FINDINGS:  EXTRACRANIAL SOFT TISSUES: Unremarkable.      CALVARIUM: No depressed skull fracture. No destructive osseous lesion.      PARANASAL SINUSES/MASTOIDS: The visualized paranasal sinuses and  mastoid air cells are aerated.      HEMORRHAGE: No acute intracranial hemorrhage.      BRAIN PARENCHYMA: Gray-white matter interfaces are preserved. No mass  effect or midline shift. Encephalomalacia in the right hemisphere  similar compared to prior imaging compatible with remote infarct.  Additional nonspecific scattered white matter hypodensities are again  seen.      VENTRICLES and EXTRA-AXIAL SPACES: Parenchymal atrophy with  prominence of the ventricles and cortical sulci.      OTHER FINDINGS: There are calcifications within the cavernous carotids      CERVICAL SPINE:      ALIGNMENT: Straightening of the normal cervical lordosis, which may  be positional or related to spasm. Grade 1 anterolisthesis C4 on C5  and C5 on  C6. VERTEBRAE: No acute loss of vertebral body height.  Bones are demineralized. DISC SPACE: Disc space narrowing C5/C6 and  C6/C7. SPINAL CANAL: Multilevel facet and uncovertebral arthropathy  with varying degrees of neural foraminal stenosis. Posterior disc  osteophyte complexes are most pronounced at C5/C6 and C6/C7 and  contribute to moderate central narrowing. PREVERTEBRAL SOFT TISSUES:  No prevertebral soft tissue swelling. LUNG APICES: Imaged portion of  the lung apices are within normal limits.      OTHER FINDINGS: None.       Impression:         No acute intracranial hemorrhage, mass effect or midline shift.      Encephalomalacia in the right hemisphere similar compared to prior  imaging is compatible with remote infarct. Additional nonspecific  scattered white matter hypodensities favored to represent sequela of  small vessel ischemia. Cervical spondylosis without acute loss of  vertebral body height or traumatic malalignment.          MACRO:  None.      Signed by: Evan Finkelstein 4/4/2025 10:33 PM  Dictation workstation:   [id number]   XR chest 2 views [[id number]] Collected: 04/04/25 2308   Order Status: Completed Updated: 04/04/25 2315   Narrative:     STUDY:  Chest Radiographs;  4/4/2025 9:37PM  INDICATION:  Fall.  COMPARISON:  XR Chest: 2/13/2023  ACCESSION NUMBER(S):  [id number]  ORDERING CLINICIAN:  DOT HUSTON  TECHNIQUE:  Frontal and lateral chest.  FINDINGS:  CARDIOMEDIASTINAL SILHOUETTE:  Cardiomediastinal silhouette is mildly prominent and unchanged..     LUNGS:  Lungs are clear.     ABDOMEN:  No remarkable upper abdominal findings.     BONES:  No acute osseous changes.  Advanced degenerative changes are  demonstrated of both shoulder joints.   Impression:     No acute abnormality.  Signed by Rush Martin DO   XR tibia fibula left 2 views [[id number]] Collected: 04/04/25 2158   Order Status: Completed Updated: 04/04/25 2204   Narrative:     STUDY:  Tibia and Fibula Radiographs;  04/004/2025 9:36 PM  INDICATION:  Fall.  COMPARISON:  None.  ACCESSION NUMBER(S):  [id number]  ORDERING CLINICIAN:  DOT HUSTON  TECHNIQUE:  Two view(s) of the left tibia and fibula.  FINDINGS:    There is no displaced fracture.  The alignment is anatomic.  No soft  tissue abnormality is seen. There are degenerative changes of the knee  and ankle joint   Impression:     No acute osseous abnormality.  Degenerative changes.  Signed by Davin Doyle MD   XR pelvis 1-2 views [[id number]] Collected: 04/04/25 2159   Order Status: Completed Updated: 04/04/25 2205   Narrative:     STUDY:  Pelvis Radiographs; 04/04/2025 9:36 PM  INDICATION:  Fall.  COMPARISON:  XR right hip pelvis 12/15/2021.  ACCESSION NUMBER(S):  [id number]  ORDERING CLINICIAN:  DOT HUSTON  TECHNIQUE:  Single view of the pelvis.  FINDINGS:    The pelvic ring is intact.  There is no acute fracture. Bilateral  degenerative changes of the hips.   Impression:     No acute osseous abnormality.  Bilateral degenerative changes of the hips..  Signed by Davin Doyle MD                  Medical Problems         Problem List         * (Principal) Generalized weakness     Arteriosclerotic coronary artery disease     Benign essential hypertension     Hyperlipidemia     Volvulus of colon (Multi)     Vascular insufficiency of intestine (Multi)     Sensorineural hearing loss (SNHL) of both ears                  Above medical problems may be reflective of historical medical problems that may have resolved and may not related to acute clinical condition/medical problems.     Clinical impression/plan:  Expand All Collapse All    SUBJECTIVE      Pt requiring sitter due to impulsivity  Has been having visual hallucinations     4/7:              Today the patient is found awake alert and interactive appropriately sitting in the bedside chair.  Complains only of feeling tired and weak.  No acute events overnight.  Her Coreg was held yesterday due to  bradycardia.  Continues today with heart rate ranging from 54-79.  Asymptomatic with this.  Blood pressure stable.  Otherwise denies interval new symptoms or complaints including chest pain palpitations pleuritic type pain unusual shortness of breath cough sputum nausea abdominal pain flank pain fever or chills.     4/8:              Today the patient remains awake alert and interactive appropriately in bed.  No acute events overnight.  Voices no specific complaints.  Heart rate has remained mildly bradycardic and asymptomatic.  Coreg continues to be held.  Not requiring a sitter.  At this time awaiting authorization for SNF. Otherwise denies interval new symptoms or complaints including chest pain palpitations pleuritic type pain unusual shortness of breath cough sputum nausea abdominal pain flank pain fever or chills     4/9:               Today the patient once again is awake alert and interactive appropriately in bed.  Specific complaints and no acute events overnight.  Heart rate appears improved in the normal range today.  Blood pressures trending up and may need to resume Coreg.  Continuing to await authorization for SNF. Otherwise denies interval new symptoms or complaints including chest pain palpitations pleuritic type pain unusual shortness of breath cough sputum nausea abdominal pain flank pain fever or chills     4/10:               Today the patient is found sleeping but does awaken to name and exam.  Voices no specific complaints and no acute events overnight.  Continues with good heart rate and blood pressure levels.  Notified of insurance denial for SNF and awaiting appeal which likely will be on the weekend.  Otherwise denies interval new symptoms or complaints including chest pain palpitations pleuritic type pain unusual shortness of breath cough sputum nausea abdominal pain flank pain fever or chills     4/11:               Today patient is seen in the bedside chair more awake alert and  "interactive appropriately.  Voices no specific complaints and no acute complaints.  Today continues with mild asymptomatic bradycardia.  Continues to do well on room air.  Blood pressure normal and stable.  Continuing at this point to await appeal for SNF. Otherwise denies interval new symptoms or complaints including chest pain palpitations pleuritic type pain unusual shortness of breath cough sputum nausea abdominal pain flank pain fever or chills     4/12:               Today patient seen in bed and remains awake alert and interactive appropriately.  Voices no specific complaints and no acute events overnight.  Continues on room air.  Vital signs otherwise normal and stable. Continuing at this point to await appeal for SNF. Otherwise denies interval new symptoms or complaints including chest pain palpitations pleuritic type pain unusual shortness of breath cough sputum nausea abdominal pain flank pain fever or chills     4/13:                Today patient seen once again in bed.  Sleeping but awakens easily to name and exam.  Interactive at her baseline otherwise.  Voices no specific complaints and no acute events overnight.  Continuing to await appeal for SNF.  Otherwise denies interval new symptoms or complaints including chest pain palpitations pleuritic type pain unusual shortness of breath cough sputum nausea abdominal pain flank pain fever or chills        OBJECTIVE:         Physical Exam  /62 (BP Location: Right arm, Patient Position: Lying)   Pulse 59   Temp 35.9 °C (96.7 °F) (Temporal)   Resp 18   Ht 1.651 m (5' 5\")   Wt 74.8 kg (164 lb 14.5 oz)   SpO2 97%   BMI 27.44 kg/m²   Body mass index is 27.44 kg/m².     GEN - NAD, slender elderly  female awake alert and interactive appropriately  PULM - CTA  CVS - RRR, normal rate, 2/6 murmur  ABD - soft and nontender  EXTR - trace bilat LE edema  Psych: Pleasant and cooperative to exam     Scheduled medications     Scheduled Medications " "  aspirin, 81 mg, oral, Daily  atorvastatin, 20 mg, oral, Nightly  [Held by provider] carvedilol, 3.125 mg, oral, BID  enoxaparin, 30 mg, subcutaneous, q24h  lisinopril, 10 mg, oral, Daily  pantoprazole, 40 mg, oral, Daily before breakfast   Or  pantoprazole, 40 mg, intravenous, Daily before breakfast         Continuous medications  Continuous Medications          PRN medications  PRN Medications   PRN medications: acetaminophen, guaiFENesin, LORazepam, melatonin, ondansetron, polyethylene glycol         Labs          Results from last 7 days   Lab Units 04/08/25  0425   WBC AUTO x10*3/uL 6.0   HEMOGLOBIN g/dL 9.6*   HEMATOCRIT % 30.8*   PLATELETS AUTO x10*3/uL 260                Results from last 7 days   Lab Units 04/09/25  0449 04/08/25  0425   SODIUM mmol/L 137 138   POTASSIUM mmol/L 4.3 4.0   CHLORIDE mmol/L 106 107   CO2 mmol/L 25 25   BUN mg/dL 24* 22   CREATININE mg/dL 1.19* 1.29*   CALCIUM mg/dL 9.7 8.9   GLUCOSE mg/dL 80 88            Microbiology:   No results found for the last 90 days.                     No lab exists for component: \"AGALPCRNB\"   .ID              Lab Results   Component Value Date     URINECULTURE   04/05/2025       Growth indicates contamination with periurethral roly. Repeat culture if clinically indicated.            ASSESSMENT & PLAN:      1)  Fall/Debility  -  no fractures or acute injuries found during workup  - PT eval pending and will likely need SNF.  PT [facility] is 16 - even if not qualifying for SNF it sounds like she will need placement due to presumed dementia and unsafe living independently  - sounds like family has 24/7 caregivers and has cameras in the home for safety  - no other acute metabolic or infectious etiologies found  4/7: AM-PAC 15.  Working towards SNF.  4/13: Initially denied by insurance.  Transitional care pursuing appeal.     2)  Possible UTI  -  empiric Keflex started but culture suggests contamination >> will stop abx     3) CAD/HTN/bradycardia  -  " resumed home meds  - has been bradycardic and will hold coreg  4/7: Continues today with mild asymptomatic bradycardia.    4/9: Heart rates primarily good range and normal today.  Blood pressure perhaps starting to trend up and may need to resume Coreg.  Continue to monitor.               Disposition/additional care plan/interventions:4/14/2025     Patient with advanced age despite appearing stable at this juncture appear to remained at risk for clinical decline and deterioration.     Will discussed with T[facility] concerning discharge disposition/planning.     Continue to monitor off antibiotics.     Bradycardia resolved................ Monitor off Coreg     CAD continue current longitudinal care     Monitor blood pressure at risk for rebound hypertension with antihypertensive therapy on hold secondary to bradycardia,.     BMP in a.m. consider possible 15 mg of lisinopril daily if blood pressure continue to escalate, swings in BPs with systolic blood pressure in the 100 range so we will monitor closely.      Disposition/additional care plan/interventions:4/15/2025      Upward trending of creatinine today.....................Oral hydration, monitor renal function.    Hypertension............. Coreg on hold in light of bradycardia, hydralazine 10 mg orally daily added, further increase as clinically warranted.  Given  bradycardia will  not discharged on Coreg    Monitor blood pressure response    Continue supportive care    Discharge planning    . Further recommendations forthcoming in accordance with patient's clinical disposition and response to care.     Discharge planning:Discharge barrier awaiting discussion with family and [facility] concerning discharge options.                          Dictation performed with assistance of voice recognition device therefore transcription errors are possible.

## 2025-04-15 NOTE — PROGRESS NOTES
4/15/25 1422   04/15/25 1422   Intensity of Service   Intensity of Service >30 min     Called [facility] to get a status update on Expedited Appeal. Spent over 45 minutes on the phone. The rep stated that the Appointment of Representative form was not filled out correctly and was dismissed. Inquired as to why the CT was not notified of this. No answer was available. When inquiring about what was wrong with the form, rep had difficulty in explaining the reason the form was wrong. Final conclusion was that the information needs to be the pt information in the first section and since the pt is unable to sign that then the MD must sign as pt is confused. Refaxed all information as requested. Attempted to call daughter as pt walked 100 feet yesterday with just contact guard to see if would want to do home with [facility]. No answer. Left a gvm with call back information.   Nanci Mena RN, BSN, [facility] Supervisor      4/15/25 1510  Received a call back from daughter, Anat, and updated her on expedited appeal process. Inquired if she still wanted to attempt and await for the appeal. Daughter wants to speak with brother after reviewing yesterdays therapy notes with daughter. Daughter wants to continue to await expedited appeal at this time and discuss again tomorrow after speaking with brother. Discussed [facility] and [facility]. Daughter appreciative of the discussion. Daughter also inquired about how to apply for Medicaid to get pt into LTC eventually. Daughter agreeable to SW sending a [facility] referral.   Nanci Mena RN, BSN, [facility] Supervisor

## 2025-04-16 LAB
ANION GAP SERPL CALC-SCNC: 13 MMOL/L (ref 10–20)
BUN SERPL-MCNC: 22 MG/DL (ref 6–23)
CALCIUM SERPL-MCNC: 10.3 MG/DL (ref 8.6–10.3)
CHLORIDE SERPL-SCNC: 106 MMOL/L (ref 98–107)
CO2 SERPL-SCNC: 23 MMOL/L (ref 21–32)
CREAT SERPL-MCNC: 1.23 MG/DL (ref 0.5–1.05)
EGFRCR SERPLBLD CKD-EPI 2021: 41 ML/MIN/1.73M*2
GLUCOSE SERPL-MCNC: 76 MG/DL (ref 74–99)
POTASSIUM SERPL-SCNC: 4.5 MMOL/L (ref 3.5–5.3)
SODIUM SERPL-SCNC: 137 MMOL/L (ref 136–145)

## 2025-04-16 PROCEDURE — G0378 HOSPITAL OBSERVATION PER HR: HCPCS

## 2025-04-16 PROCEDURE — 36415 COLL VENOUS BLD VENIPUNCTURE: CPT | Performed by: HOSPITALIST

## 2025-04-16 PROCEDURE — 97535 SELF CARE MNGMENT TRAINING: CPT | Mod: GO,CO

## 2025-04-16 PROCEDURE — 2500000004 HC RX 250 GENERAL PHARMACY W/ HCPCS (ALT 636 FOR OP/ED): Mod: JZ

## 2025-04-16 PROCEDURE — 96372 THER/PROPH/DIAG INJ SC/IM: CPT

## 2025-04-16 PROCEDURE — 97110 THERAPEUTIC EXERCISES: CPT | Mod: GP

## 2025-04-16 PROCEDURE — 97116 GAIT TRAINING THERAPY: CPT

## 2025-04-16 PROCEDURE — 2500000001 HC RX 250 WO HCPCS SELF ADMINISTERED DRUGS (ALT 637 FOR MEDICARE OP)

## 2025-04-16 PROCEDURE — 2500000001 HC RX 250 WO HCPCS SELF ADMINISTERED DRUGS (ALT 637 FOR MEDICARE OP): Performed by: EMERGENCY MEDICINE

## 2025-04-16 PROCEDURE — 99232 SBSQ HOSP IP/OBS MODERATE 35: CPT | Performed by: HOSPITALIST

## 2025-04-16 PROCEDURE — 80048 BASIC METABOLIC PNL TOTAL CA: CPT | Performed by: HOSPITALIST

## 2025-04-16 PROCEDURE — 97110 THERAPEUTIC EXERCISES: CPT

## 2025-04-16 PROCEDURE — 2500000001 HC RX 250 WO HCPCS SELF ADMINISTERED DRUGS (ALT 637 FOR MEDICARE OP): Performed by: HOSPITALIST

## 2025-04-16 PROCEDURE — 97116 GAIT TRAINING THERAPY: CPT | Mod: GP

## 2025-04-16 RX ADMIN — ENOXAPARIN SODIUM 30 MG: 30 INJECTION SUBCUTANEOUS at 16:48

## 2025-04-16 RX ADMIN — LISINOPRIL 10 MG: 10 TABLET ORAL at 08:38

## 2025-04-16 RX ADMIN — HYDRALAZINE HYDROCHLORIDE 10 MG: 10 TABLET ORAL at 08:39

## 2025-04-16 RX ADMIN — ATORVASTATIN CALCIUM 20 MG: 20 TABLET, FILM COATED ORAL at 20:08

## 2025-04-16 RX ADMIN — ACETAMINOPHEN 650 MG: 325 TABLET ORAL at 05:14

## 2025-04-16 RX ADMIN — ASPIRIN 81 MG: 81 TABLET, COATED ORAL at 08:39

## 2025-04-16 RX ADMIN — PANTOPRAZOLE SODIUM 40 MG: 40 TABLET, DELAYED RELEASE ORAL at 05:15

## 2025-04-16 RX ADMIN — LORAZEPAM 0.5 MG: 0.5 TABLET ORAL at 05:14

## 2025-04-16 ASSESSMENT — COGNITIVE AND FUNCTIONAL STATUS - GENERAL
TOILETING: A LOT
WALKING IN HOSPITAL ROOM: A LOT
STANDING UP FROM CHAIR USING ARMS: A LITTLE
DAILY ACTIVITIY SCORE: 16
DRESSING REGULAR UPPER BODY CLOTHING: A LITTLE
DAILY ACTIVITIY SCORE: 16
MOBILITY SCORE: 16
TURNING FROM BACK TO SIDE WHILE IN FLAT BAD: A LITTLE
MOVING TO AND FROM BED TO CHAIR: A LITTLE
DRESSING REGULAR LOWER BODY CLOTHING: A LOT
TOILETING: A LOT
MOVING FROM LYING ON BACK TO SITTING ON SIDE OF FLAT BED WITH BEDRAILS: A LITTLE
WALKING IN HOSPITAL ROOM: A LITTLE
STANDING UP FROM CHAIR USING ARMS: A LOT
CLIMB 3 TO 5 STEPS WITH RAILING: TOTAL
TURNING FROM BACK TO SIDE WHILE IN FLAT BAD: A LITTLE
CLIMB 3 TO 5 STEPS WITH RAILING: TOTAL
HELP NEEDED FOR BATHING: A LOT
MOBILITY SCORE: 14
HELP NEEDED FOR BATHING: A LOT
STANDING UP FROM CHAIR USING ARMS: A LOT
MOVING TO AND FROM BED TO CHAIR: A LOT
TURNING FROM BACK TO SIDE WHILE IN FLAT BAD: A LITTLE
CLIMB 3 TO 5 STEPS WITH RAILING: TOTAL
DAILY ACTIVITIY SCORE: 16
DRESSING REGULAR LOWER BODY CLOTHING: A LOT
MOBILITY SCORE: 14
PERSONAL GROOMING: A LITTLE
DRESSING REGULAR LOWER BODY CLOTHING: A LOT
DRESSING REGULAR UPPER BODY CLOTHING: A LITTLE
TOILETING: A LOT
PERSONAL GROOMING: A LITTLE
MOVING TO AND FROM BED TO CHAIR: A LOT
WALKING IN HOSPITAL ROOM: A LOT
PERSONAL GROOMING: A LITTLE
HELP NEEDED FOR BATHING: A LOT
DRESSING REGULAR UPPER BODY CLOTHING: A LITTLE

## 2025-04-16 ASSESSMENT — PAIN SCALES - GENERAL
PAINLEVEL_OUTOF10: 0 - NO PAIN

## 2025-04-16 ASSESSMENT — PAIN SCALES - PAIN ASSESSMENT IN ADVANCED DEMENTIA (PAINAD)
NEGVOCALIZATION: OCCASIONAL MOAN/GROAN, LOW SPEECH, NEGATIVE/DISAPPROVING QUALITY
TOTALSCORE: 3
CONSOLABILITY: DISTRACTED OR REASSURED BY VOICE/TOUCH
TOTALSCORE: MEDICATION (SEE MAR)
FACIALEXPRESSION: SAD, FRIGHTENED, FROWN
BREATHING: NORMAL
BODYLANGUAGE: RELAXED

## 2025-04-16 ASSESSMENT — ACTIVITIES OF DAILY LIVING (ADL): HOME_MANAGEMENT_TIME_ENTRY: 23

## 2025-04-16 ASSESSMENT — PAIN DESCRIPTION - DESCRIPTORS: DESCRIPTORS: ACHING

## 2025-04-16 ASSESSMENT — PAIN - FUNCTIONAL ASSESSMENT
PAIN_FUNCTIONAL_ASSESSMENT: 0-10
PAIN_FUNCTIONAL_ASSESSMENT: PAINAD (PAIN ASSESSMENT IN ADVANCED DEMENTIA SCALE)
PAIN_FUNCTIONAL_ASSESSMENT: 0-10

## 2025-04-16 NOTE — PROGRESS NOTES
4/16/25 1011   04/16/25 1010   Discharge Planning   Expected Discharge Disposition SNF     Called and spoke with Caitlin from [facility] to update them that appeal is being completed under the ordering physician and AOR form is not needed. Caitlin stated updated the team and stated that they have until 4/18 to provide a determination and provided reference number of [id number].   Nanci Mena RN, BSN, [place]/ [facility] Supervisor     4/16/25 9696  Received a call from [facility]aira with [facility]. Will be sending a form for formality purposes and confirmed nothing else is needed at this time. Zaira stated that this form does NOT need completed. Zaira provided case # [id number] with her direct number of [phone].  Nanci Mena RN, BSN, [facility] Supervisor

## 2025-04-16 NOTE — PROGRESS NOTES
Sylvia Johnson [id number]   Service: Internal Medicine / Hospitalist Date of service: 4/16/2025                                  DNR and No Intubation                        Subjective       History Of Present Illness (HPI):  Sylvia Moody is a 95 y.o. female with PMHx s/f CAD s/p prior CABG, HTN, DLD presenting with unwitnessed fall. She is a poor historian and doesn't remember what happened, most history obtained by chart review.  Per ambulance record, EMS was requested for patient with a fall and had a leg injury.  On EMS arrival family stated that she had fallen and was able to get her up into a chair.  Her family member thought she may have a broken hip as her leg appeared outwardly rotated.  She is acting normal for her per family and is usually ANO x 2 at baseline.  She is unable to recount how she fell or how long she was down or if she hit her head.  Denies headache, neck pain, chest pain, SOB, dysuria, focal or lateralizing weakness.     ED Course:   Vitals on presentation: T 36.4 °C (97.5 °F)  HR 52  BP (!) 188/84  RR 18  O2 99 % None (Room air)  Labs:   CMP, CBC largely unremarkable  Lactate 0.6  Troponin 66 BNP 42  UA-leukocyte esterase 500, WBC 21-50  EKG: Sinus rhythm at 77 bpm, RBBB and LAFB.  , QTc 502.  Imaging - agree with radiology interpretation(s):   XR tib-fib left, pelvis-no acute osseous abnormality denies generative changes  CT head-, CT cervical spine-no acute intracranial abnormality.  Encephalomalacia in the right hemisphere similar compared to prior imaging with remote infarct.  Nonspecific scattered white matter hypodensities favoring sequela small vessel ischemia.  Cervical spondylosis.  XR chest-no acute abnormality  XR shoulder left-degenerative changes and osteopenia  Interventions: Tylenol 650 mg, aspirin 81 mg,  ml, started on Keflex 500 mg every 12 hours, admission for further management.     4/7:              Today the patient is found awake alert and  interactive appropriately sitting in the bedside chair.  Complains only of feeling tired and weak.  No acute events overnight.  Her Coreg was held yesterday due to bradycardia.  Continues today with heart rate ranging from 54-79.  Asymptomatic with this.  Blood pressure stable.  Otherwise denies interval new symptoms or complaints including chest pain palpitations pleuritic type pain unusual shortness of breath cough sputum nausea abdominal pain flank pain fever or chills.     4/8:              Today the patient remains awake alert and interactive appropriately in bed.  No acute events overnight.  Voices no specific complaints.  Heart rate has remained mildly bradycardic and asymptomatic.  Coreg continues to be held.  Not requiring a sitter.  At this time awaiting authorization for SNF. Otherwise denies interval new symptoms or complaints including chest pain palpitations pleuritic type pain unusual shortness of breath cough sputum nausea abdominal pain flank pain fever or chills     4/9:               Today the patient once again is awake alert and interactive appropriately in bed.  Specific complaints and no acute events overnight.  Heart rate appears improved in the normal range today.  Blood pressures trending up and may need to resume Coreg.  Continuing to await authorization for SNF. Otherwise denies interval new symptoms or complaints including chest pain palpitations pleuritic type pain unusual shortness of breath cough sputum nausea abdominal pain flank pain fever or chills     4/10:               Today the patient is found sleeping but does awaken to name and exam.  Voices no specific complaints and no acute events overnight.  Continues with good heart rate and blood pressure levels.  Notified of insurance denial for SNF and awaiting appeal which likely will be on the weekend.  Otherwise denies interval new symptoms or complaints including chest pain palpitations pleuritic type pain unusual shortness of  breath cough sputum nausea abdominal pain flank pain fever or chills     4/11:               Today patient is seen in the bedside chair more awake alert and interactive appropriately.  Voices no specific complaints and no acute complaints.  Today continues with mild asymptomatic bradycardia.  Continues to do well on room air.  Blood pressure normal and stable.  Continuing at this point to await appeal for SNF. Otherwise denies interval new symptoms or complaints including chest pain palpitations pleuritic type pain unusual shortness of breath cough sputum nausea abdominal pain flank pain fever or chills     4/12:               Today patient seen in bed and remains awake alert and interactive appropriately.  Voices no specific complaints and no acute events overnight.  Continues on room air.  Vital signs otherwise normal and stable. Continuing at this point to await appeal for SNF. Otherwise denies interval new symptoms or complaints including chest pain palpitations pleuritic type pain unusual shortness of breath cough sputum nausea abdominal pain flank pain fever or chills     4/13:                Today patient seen once again in bed.  Sleeping but awakens easily to name and exam.  Interactive at her baseline otherwise.  Voices no specific complaints and no acute events overnight.  Continuing to await appeal for SNF.  Otherwise denies interval new symptoms or complaints including chest pain palpitations pleuritic type pain unusual shortness of breath cough sputum nausea abdominal pain flank pain fever or chills     4/14................  Patient up in chair, disclose no acute complaints mentioned that overall she felt better today.No reported :fevers, chills, headaches, chest pains, nausea or vomiting.     4/15...................Patient seen examined in the  presence of her nurse..No reported headaches, chest pains, dyspnea, nausea, vomiting, fevers or chills.    4/16..........Patient report doing well today voiced  appreciation of her care.  No reported: Headaches, chest pains, nausea, vomiting, fevers, chills or dysuria.       Review of Systems:   Review of system otherwise negative if not aforementioned above in subjective.         chills           Objective        Physical Exam      Constitutional:       Appearance: Patient appeared in no acute cardiopulmonary distress.     Comments: Patient alert and oriented to person place  and situation  to some degree it appeared.Known chronic cognitive impairmentPatient appeared nontoxic.  HEENT:      Head: Normocephalic and atraumatic.Trachea midline      Nose:No observed congestion or rhinorrhea.     Mouth/Throat: Mucous membranes Moist, Trachea appeared  midline.  Eyes:      Extraocular Movements: Extraocular movements intact.      Pupils: Pupils are equal, round, and reactive to light.      Comments: No scleral icterus or conjunctival injection appreciated.   Cardiovascular:      Rate and Rhythm: Normal rate and regular rhythm. No clicks rubs or gallops, normal S1 and S2.No peripheral stigmata of endocarditis appreciated.  Comment: Murmur 2 out of 6     Pulmonary:      Lungs appeared clear to auscultation, no adventitious sound appreciated.  Abdominal:      General: Abdomen soft, nontender, active bowel sounds, no involuntary guarding or rebound tenderness appreciated.     Comments: None   Musculoskeletal:       No pitting edema or cyanosis appreciated.       Skin:     General: Skin is warm.      Coloration:  No jaundice     Findings: No abnormal appearing skin rashes or lesions that appeared acute noted on unclothed area of the skin..   Neurological:      General: No  new appearing focal sensory or motor deficits appreciated, no meningeal signs or dysmetria noted.   Cranial Nerves: Cranial nerves II to XII appearing grossly intact.  Comment: Patient with what appears to be chronic hearing difficulties however still appeared to have difficulties following the stream of thought,  clinical suspicion for any chronic cognitive impairment.     Genitals:  Deferred  Psychiatric:         The patient appears to be displaying normal mood and affect at the time of evaluation.     Labs:           .     Lab Results   Component Value Date     GLUCOSE 100 (H) 04/15/2025     CALCIUM 9.8 04/15/2025      04/15/2025     K 4.7 04/15/2025     CO2 22 04/15/2025      (H) 04/15/2025     BUN 29 (H) 04/15/2025     CREATININE 1.40 (H) 04/15/2025               Lab Results   Component Value Date    GLUCOSE 76 04/16/2025    CALCIUM 10.3 04/16/2025     04/16/2025    K 4.5 04/16/2025    CO2 23 04/16/2025     04/16/2025    BUN 22 04/16/2025    CREATININE 1.23 (H) 04/16/2025                                                               [unfilled]   [unfilled]   No results found for the last 90 days.                  X-rays/ Images     [unfilled]   Radiology Results (last 21 days)     Procedure Component Value Units Date/Time   XR shoulder left 2+ views [[id number]] Collected: 04/04/25 2329   Order Status: Completed Updated: 04/05/25 0005   Narrative:     STUDY:  Shoulder Radiographs; 04/04/2025 11:15  INDICATION:  Left shoulder pain.  COMPARISON:  None available.  ACCESSION NUMBER(S):  [id number]  ORDERING CLINICIAN:  LIZBETH HAYES  TECHNIQUE:  Two view(s) of the left shoulder.  FINDINGS:    There is no displaced fracture.  There is osteopenia with advanced  degenerative changes of the glenohumeral joint and acromial clavicular  joint.  There is calcific tendinopathy supraspinatus.  There is  osteopenia.  Clavicle is intact.  There is soft tissue swelling.   Impression:     Advanced degenerative changes and osteopenia with calcific  tendinopathy supraspinatus tendon.  Signed by Tani Abraham DO   CT cervical spine wo IV contrast [[id number]] Collected: 04/04/25 2234   Order Status: Completed Updated: 04/04/25 2234   Narrative:     Interpreted By:  Finkelstein, Evan,  STUDY:  CT HEAD WO IV  CONTRAST; CT CERVICAL SPINE WO IV CONTRAST;  4/4/2025  9:49 pm      INDICATION:  Signs/Symptoms:unwitnessed fall; Signs/Symptoms:unwithnessed fall.          COMPARISON:  CT brain 12/15/2021      ACCESSION NUMBER(S):  [id number]; [id number]      ORDERING CLINICIAN:  DOT HUSTON      TECHNIQUE:  Noncontrast CT images of the head with coronal and sagittal  reconstructions. Axial noncontrast CT images of the cervical spine  with coronal and sagittal reconstructed images.      FINDINGS:  EXTRACRANIAL SOFT TISSUES: Unremarkable.      CALVARIUM: No depressed skull fracture. No destructive osseous lesion.      PARANASAL SINUSES/MASTOIDS: The visualized paranasal sinuses and  mastoid air cells are aerated.      HEMORRHAGE: No acute intracranial hemorrhage.      BRAIN PARENCHYMA: Gray-white matter interfaces are preserved. No mass  effect or midline shift. Encephalomalacia in the right hemisphere  similar compared to prior imaging compatible with remote infarct.  Additional nonspecific scattered white matter hypodensities are again  seen.      VENTRICLES and EXTRA-AXIAL SPACES: Parenchymal atrophy with  prominence of the ventricles and cortical sulci.      OTHER FINDINGS: There are calcifications within the cavernous carotids      CERVICAL SPINE:      ALIGNMENT: Straightening of the normal cervical lordosis, which may  be positional or related to spasm. Grade 1 anterolisthesis C4 on C5  and C5 on C6. VERTEBRAE: No acute loss of vertebral body height.  Bones are demineralized. DISC SPACE: Disc space narrowing C5/C6 and  C6/C7. SPINAL CANAL: Multilevel facet and uncovertebral arthropathy  with varying degrees of neural foraminal stenosis. Posterior disc  osteophyte complexes are most pronounced at C5/C6 and C6/C7 and  contribute to moderate central narrowing. PREVERTEBRAL SOFT TISSUES:  No prevertebral soft tissue swelling. LUNG APICES: Imaged portion of  the lung apices are within normal limits.      OTHER  FINDINGS: None.       Impression:         No acute intracranial hemorrhage, mass effect or midline shift.      Encephalomalacia in the right hemisphere similar compared to prior  imaging is compatible with remote infarct. Additional nonspecific  scattered white matter hypodensities favored to represent sequela of  small vessel ischemia. Cervical spondylosis without acute loss of  vertebral body height or traumatic malalignment.          MACRO:  None.      Signed by: Evan Finkelstein 4/4/2025 10:33 PM  Dictation workstation:   [id number]   CT head wo IV contrast [[id number]] Collected: 04/04/25 2234   Order Status: Completed Updated: 04/04/25 2234   Narrative:     Interpreted By:  Finkelstein, Evan,  STUDY:  CT HEAD WO IV CONTRAST; CT CERVICAL SPINE WO IV CONTRAST;  4/4/2025  9:49 pm      INDICATION:  Signs/Symptoms:unwitnessed fall; Signs/Symptoms:unwithnessed fall.          COMPARISON:  CT brain 12/15/2021      ACCESSION NUMBER(S):  [id number]; [id number]      ORDERING CLINICIAN:  DOT HUSTON      TECHNIQUE:  Noncontrast CT images of the head with coronal and sagittal  reconstructions. Axial noncontrast CT images of the cervical spine  with coronal and sagittal reconstructed images.      FINDINGS:  EXTRACRANIAL SOFT TISSUES: Unremarkable.      CALVARIUM: No depressed skull fracture. No destructive osseous lesion.      PARANASAL SINUSES/MASTOIDS: The visualized paranasal sinuses and  mastoid air cells are aerated.      HEMORRHAGE: No acute intracranial hemorrhage.      BRAIN PARENCHYMA: Gray-white matter interfaces are preserved. No mass  effect or midline shift. Encephalomalacia in the right hemisphere  similar compared to prior imaging compatible with remote infarct.  Additional nonspecific scattered white matter hypodensities are again  seen.      VENTRICLES and EXTRA-AXIAL SPACES: Parenchymal atrophy with  prominence of the ventricles and cortical sulci.      OTHER FINDINGS: There are  calcifications within the cavernous carotids      CERVICAL SPINE:      ALIGNMENT: Straightening of the normal cervical lordosis, which may  be positional or related to spasm. Grade 1 anterolisthesis C4 on C5  and C5 on C6. VERTEBRAE: No acute loss of vertebral body height.  Bones are demineralized. DISC SPACE: Disc space narrowing C5/C6 and  C6/C7. SPINAL CANAL: Multilevel facet and uncovertebral arthropathy  with varying degrees of neural foraminal stenosis. Posterior disc  osteophyte complexes are most pronounced at C5/C6 and C6/C7 and  contribute to moderate central narrowing. PREVERTEBRAL SOFT TISSUES:  No prevertebral soft tissue swelling. LUNG APICES: Imaged portion of  the lung apices are within normal limits.      OTHER FINDINGS: None.       Impression:         No acute intracranial hemorrhage, mass effect or midline shift.      Encephalomalacia in the right hemisphere similar compared to prior  imaging is compatible with remote infarct. Additional nonspecific  scattered white matter hypodensities favored to represent sequela of  small vessel ischemia. Cervical spondylosis without acute loss of  vertebral body height or traumatic malalignment.          MACRO:  None.      Signed by: Evan Finkelstein 4/4/2025 10:33 PM  Dictation workstation:   [id number]   XR chest 2 views [[id number]] Collected: 04/04/25 2308   Order Status: Completed Updated: 04/04/25 2315   Narrative:     STUDY:  Chest Radiographs;  4/4/2025 9:37PM  INDICATION:  Fall.  COMPARISON:  XR Chest: 2/13/2023  ACCESSION NUMBER(S):  [id number]  ORDERING CLINICIAN:  DOT HUSTON  TECHNIQUE:  Frontal and lateral chest.  FINDINGS:  CARDIOMEDIASTINAL SILHOUETTE:  Cardiomediastinal silhouette is mildly prominent and unchanged..     LUNGS:  Lungs are clear.     ABDOMEN:  No remarkable upper abdominal findings.     BONES:  No acute osseous changes.  Advanced degenerative changes are  demonstrated of both shoulder joints.   Impression:     No  acute abnormality.  Signed by Rush Martin DO   XR tibia fibula left 2 views [[id number]] Collected: 04/04/25 2158   Order Status: Completed Updated: 04/04/25 2204   Narrative:     STUDY:  Tibia and Fibula Radiographs; 04/004/2025 9:36 PM  INDICATION:  Fall.  COMPARISON:  None.  ACCESSION NUMBER(S):  [id number]  ORDERING CLINICIAN:  DOT HUSTON  TECHNIQUE:  Two view(s) of the left tibia and fibula.  FINDINGS:    There is no displaced fracture.  The alignment is anatomic.  No soft  tissue abnormality is seen. There are degenerative changes of the knee  and ankle joint   Impression:     No acute osseous abnormality.  Degenerative changes.  Signed by Davin Doyle MD   XR pelvis 1-2 views [[id number]] Collected: 04/04/25 2159   Order Status: Completed Updated: 04/04/25 2205   Narrative:     STUDY:  Pelvis Radiographs; 04/04/2025 9:36 PM  INDICATION:  Fall.  COMPARISON:  XR right hip pelvis 12/15/2021.  ACCESSION NUMBER(S):  [id number]  ORDERING CLINICIAN:  DOT HUSTON  TECHNIQUE:  Single view of the pelvis.  FINDINGS:    The pelvic ring is intact.  There is no acute fracture. Bilateral  degenerative changes of the hips.   Impression:     No acute osseous abnormality.  Bilateral degenerative changes of the hips..  Signed by Davin Doyle MD                  Medical Problems         Problem List         * (Principal) Generalized weakness     Arteriosclerotic coronary artery disease     Benign essential hypertension     Hyperlipidemia     Volvulus of colon (Multi)     Vascular insufficiency of intestine (Multi)     Sensorineural hearing loss (SNHL) of both ears                  Above medical problems may be reflective of historical medical problems that may have resolved and may not related to acute clinical condition/medical problems.     Clinical impression/plan:  Expand All Collapse All    SUBJECTIVE      Pt requiring sitter due to impulsivity  Has been having visual hallucinations     4/7:               Today the patient is found awake alert and interactive appropriately sitting in the bedside chair.  Complains only of feeling tired and weak.  No acute events overnight.  Her Coreg was held yesterday due to bradycardia.  Continues today with heart rate ranging from 54-79.  Asymptomatic with this.  Blood pressure stable.  Otherwise denies interval new symptoms or complaints including chest pain palpitations pleuritic type pain unusual shortness of breath cough sputum nausea abdominal pain flank pain fever or chills.     4/8:              Today the patient remains awake alert and interactive appropriately in bed.  No acute events overnight.  Voices no specific complaints.  Heart rate has remained mildly bradycardic and asymptomatic.  Coreg continues to be held.  Not requiring a sitter.  At this time awaiting authorization for SNF. Otherwise denies interval new symptoms or complaints including chest pain palpitations pleuritic type pain unusual shortness of breath cough sputum nausea abdominal pain flank pain fever or chills     4/9:               Today the patient once again is awake alert and interactive appropriately in bed.  Specific complaints and no acute events overnight.  Heart rate appears improved in the normal range today.  Blood pressures trending up and may need to resume Coreg.  Continuing to await authorization for SNF. Otherwise denies interval new symptoms or complaints including chest pain palpitations pleuritic type pain unusual shortness of breath cough sputum nausea abdominal pain flank pain fever or chills     4/10:               Today the patient is found sleeping but does awaken to name and exam.  Voices no specific complaints and no acute events overnight.  Continues with good heart rate and blood pressure levels.  Notified of insurance denial for SNF and awaiting appeal which likely will be on the weekend.  Otherwise denies interval new symptoms or complaints including chest pain  "palpitations pleuritic type pain unusual shortness of breath cough sputum nausea abdominal pain flank pain fever or chills     4/11:               Today patient is seen in the bedside chair more awake alert and interactive appropriately.  Voices no specific complaints and no acute complaints.  Today continues with mild asymptomatic bradycardia.  Continues to do well on room air.  Blood pressure normal and stable.  Continuing at this point to await appeal for SNF. Otherwise denies interval new symptoms or complaints including chest pain palpitations pleuritic type pain unusual shortness of breath cough sputum nausea abdominal pain flank pain fever or chills     4/12:               Today patient seen in bed and remains awake alert and interactive appropriately.  Voices no specific complaints and no acute events overnight.  Continues on room air.  Vital signs otherwise normal and stable. Continuing at this point to await appeal for SNF. Otherwise denies interval new symptoms or complaints including chest pain palpitations pleuritic type pain unusual shortness of breath cough sputum nausea abdominal pain flank pain fever or chills     4/13:                Today patient seen once again in bed.  Sleeping but awakens easily to name and exam.  Interactive at her baseline otherwise.  Voices no specific complaints and no acute events overnight.  Continuing to await appeal for SNF.  Otherwise denies interval new symptoms or complaints including chest pain palpitations pleuritic type pain unusual shortness of breath cough sputum nausea abdominal pain flank pain fever or chills        OBJECTIVE:         Physical Exam  /62 (BP Location: Right arm, Patient Position: Lying)   Pulse 59   Temp 35.9 °C (96.7 °F) (Temporal)   Resp 18   Ht 1.651 m (5' 5\")   Wt 74.8 kg (164 lb 14.5 oz)   SpO2 97%   BMI 27.44 kg/m²   Body mass index is 27.44 kg/m².     GEN - NAD, slender elderly  female awake alert and interactive " "appropriately  PULM - CTA  CVS - RRR, normal rate, 2/6 murmur  ABD - soft and nontender  EXTR - trace bilat LE edema  Psych: Pleasant and cooperative to exam     Scheduled medications     Scheduled Medications   aspirin, 81 mg, oral, Daily  atorvastatin, 20 mg, oral, Nightly  [Held by provider] carvedilol, 3.125 mg, oral, BID  enoxaparin, 30 mg, subcutaneous, q24h  lisinopril, 10 mg, oral, Daily  pantoprazole, 40 mg, oral, Daily before breakfast   Or  pantoprazole, 40 mg, intravenous, Daily before breakfast         Continuous medications  Continuous Medications          PRN medications  PRN Medications   PRN medications: acetaminophen, guaiFENesin, LORazepam, melatonin, ondansetron, polyethylene glycol         Labs          Results from last 7 days   Lab Units 04/08/25  0425   WBC AUTO x10*3/uL 6.0   HEMOGLOBIN g/dL 9.6*   HEMATOCRIT % 30.8*   PLATELETS AUTO x10*3/uL 260                Results from last 7 days   Lab Units 04/09/25  0449 04/08/25  0425   SODIUM mmol/L 137 138   POTASSIUM mmol/L 4.3 4.0   CHLORIDE mmol/L 106 107   CO2 mmol/L 25 25   BUN mg/dL 24* 22   CREATININE mg/dL 1.19* 1.29*   CALCIUM mg/dL 9.7 8.9   GLUCOSE mg/dL 80 88            Microbiology:   No results found for the last 90 days.                     No lab exists for component: \"AGALPCRNB\"   .ID              Lab Results   Component Value Date     URINECULTURE   04/05/2025       Growth indicates contamination with periurethral roly. Repeat culture if clinically indicated.            ASSESSMENT & PLAN:      1)  Fall/Debility  -  no fractures or acute injuries found during workup  - PT eval pending and will likely need SNF.  PT [facility] is [facility] - even if not qualifying for SNF it sounds like she will need placement due to presumed dementia and unsafe living independently  - sounds like family has 24/7 caregivers and has cameras in the home for safety  - no other acute metabolic or infectious etiologies found  4/7: AM-PAC 15.  Working towards " SNF.  4/13: Initially denied by insurance.  Transitional care pursuing appeal.     2)  Possible UTI  -  empiric Keflex started but culture suggests contamination >> will stop abx     3) CAD/HTN/bradycardia  -  resumed home meds  - has been bradycardic and will hold coreg  4/7: Continues today with mild asymptomatic bradycardia.    4/9: Heart rates primarily good range and normal today.  Blood pressure perhaps starting to trend up and may need to resume Coreg.  Continue to monitor.               Disposition/additional care plan/interventions:4/14/2025     Patient with advanced age despite appearing stable at this juncture appear to remained at risk for clinical decline and deterioration.     Will discussed with TCC concerning discharge disposition/planning.     Continue to monitor off antibiotics.     Bradycardia resolved................ Monitor off Coreg     CAD continue current longitudinal care     Monitor blood pressure at risk for rebound hypertension with antihypertensive therapy on hold secondary to bradycardia,.     BMP in a.m. consider possible 15 mg of lisinopril daily if blood pressure continue to escalate, swings in BPs with systolic blood pressure in the 100 range so we will monitor closely.      Disposition/additional care plan/interventions:4/15/2025        Upward trending of creatinine today.....................Oral hydration, monitor renal function.     Hypertension............. Coreg on hold in light of bradycardia, hydralazine 10 mg orally daily added, further increase as clinically warranted.  Given  bradycardia will  not discharged on Coreg     Monitor blood pressure response     Continue supportive care     Discharge planning     . Further recommendations forthcoming in accordance with patient's clinical disposition and response to care.     Disposition/additional care plan/interventions:4/15/2025    Hypertension appears improved, continue monitoring however  Continue once a day dosing of  hydralazine at this time.    Creatinine levels improved today    Discharge barrier awaiting appeal before discharge disposition to skilled nursing facility.         Discharge planning: Discharge barrier awaiting appeal determination for skilled nursing facility.                       Dictation performed with assistance of voice recognition device therefore transcription errors are possible.

## 2025-04-16 NOTE — CARE PLAN
The patient's goals for the shift include      The clinical goals for the shift include Patient will remain free from falls and injury throughout this shift      Problem: Pain - Adult  Goal: Verbalizes/displays adequate comfort level or baseline comfort level  Outcome: Progressing     Problem: Safety - Adult  Goal: Free from fall injury  Outcome: Progressing     Problem: Discharge Planning  Goal: Discharge to home or other facility with appropriate resources  Outcome: Progressing     Problem: Chronic Conditions and Co-morbidities  Goal: Patient's chronic conditions and co-morbidity symptoms are monitored and maintained or improved  Outcome: Progressing     Problem: Nutrition  Goal: Nutrient intake appropriate for maintaining nutritional needs  Outcome: Progressing     Problem: Skin  Goal: Decreased wound size/increased tissue granulation at next dressing change  Outcome: Progressing  Goal: Participates in plan/prevention/treatment measures  Outcome: Progressing  Goal: Prevent/manage excess moisture  Outcome: Progressing  Goal: Prevent/minimize sheer/friction injuries  Outcome: Progressing  Goal: Promote/optimize nutrition  Outcome: Progressing  Goal: Promote skin healing  Outcome: Progressing  Flowsheets (Taken 4/16/2025 1931)  Promote skin healing: Turn/reposition every 2 hours/use positioning/transfer devices

## 2025-04-16 NOTE — PROGRESS NOTES
Physical Therapy    Physical Therapy Treatment    Patient Name: Sylvia Moody  MRN: [id number]  Department: [facility]  Room: 2311/2311-B  Today's Date: 4/16/2025  Time Calculation  Start Time: 1026  Stop Time: 1054  Time Calculation (min): 28 min         Assessment/Plan   PT Assessment  PT Assessment Results: Decreased strength, Decreased endurance, Impaired balance, Impaired hearing  Barriers to Discharge Home: Caregiver assistance, Cognition needs, Physical needs  End of Session Communication: Bedside nurse  Assessment Comment: Patient tolerated tx session well requiring decreased level of assistance with transfers MIN A > CGA this tx session. Patient will continue to progress towards goals and ADL's to return to OF.  End of Session Patient Position: Up in chair, Alarm on  PT Plan  Inpatient/Swing Bed or Outpatient: Inpatient  PT Plan  Treatment/Interventions: Transfer training, Gait training, Stair training, Balance training, Neuromuscular re-education, Strengthening, Endurance training, Therapeutic exercise, Therapeutic activity, Postural re-education, Positioning, Bed mobility  PT Plan: Ongoing PT  PT Frequency: 3 times per week  PT Discharge Recommendations: Moderate intensity level of continued care      General Visit Information:   PT  Visit  PT Received On: 04/16/25  Response to Previous Treatment: Patient with no complaints from previous session.  General  Reason for Referral: Impaired Mobility  Past Medical History Relevant to Rehab: CAD s/p CABG, dyslipidemia, recent fall  Family/Caregiver Present: No  Prior to Session Communication: Bedside nurse  Patient Position Received: Up in chair, Alarm on  General Comment: Pt alert and oriented for therapy session.    Subjective   Precautions:  Precautions  Hearing/Visual Limitations: [place]: no hearing aids in ED  Medical Precautions: Fall precautions     Date/Time Vitals Session Patient Position Pulse Resp SpO2 BP MAP (mmHg)    04/16/25 1026 --  --  --  --  --   123/76  --           Vital Signs Comment: BP taken in standing d/t patient reporting dizziness upon standing up from chair.     Objective   Pain:  Pain Assessment  Pain Assessment: 0-10  0-10 (Numeric) Pain Score: 0 - No pain  Cognition:  Cognition  Orientation Level: Disoriented to time, Disoriented to situation  Coordination:     Postural Control:  Static Standing Balance  Static Standing-Balance Support: Bilateral upper extremity supported  Static Standing-Level of Assistance: Contact guard  Static Standing-Comment/Number of Minutes: Patient standing x2 mins during therapist taking vitals.    Activity Tolerance:  Activity Tolerance  Endurance: Tolerates 10 - 20 min exercise with multiple rests  Treatments:  Therapeutic Exercise  Therapeutic Exercise Performed: Yes  Therapeutic Exercise Activity 1: pt completed seated eric LE exercises to improve strength and ROM; alternating hip flexion 3x10, LAQ 2x10.    Ambulation/Gait Training  Ambulation/Gait Training Performed: Yes  Ambulation/Gait Training 1  Surface 1: Level tile  Device 1: Rolling walker  Assistance 1: Minimum assistance, Minimal verbal cues  Quality of Gait 1: Inconsistent stride length, Diminished heel strike, Forward flexed posture  Comments/Distance (ft) 1: Gait trg; 15'x1 with FWW from chair to bathroom. Patient requires minimal to moderate directional cueing from therapist.  Transfers  Transfer: Yes  Transfer 1  Transfer From 1: Chair with arms to  Transfer to 1: Stand  Technique 1: Sit to stand  Transfer Device 1: Walker  Transfer Level of Assistance 1: Contact guard  Transfers 2  Transfer From 2: Stand to  Transfer to 2: Chair with arms  Technique 2: Stand to sit  Transfer Device 2: Walker  Transfer Level of Assistance 2: Contact guard, Maximum verbal cues  Trials/Comments 2: Max cues for safe hand placement  Transfers 3  Transfer From 3: Stand to  Transfer to 3: Toilet  Technique 3: Stand to sit  Transfer Device 3: Walker  Transfer Level of  Assistance 3: Minimum assistance  Transfers 4  Transfer From 4: Toilet to  Transfer to 4: Stand  Technique 4: Sit to stand  Transfer Device 4: Walker  Transfer Level of Assistance 4: Minimum assistance, Maximum verbal cues    Outcome Measures:  [facility] Basic Mobility  Turning from your back to your side while in a flat bed without using bedrails: A little  Moving from lying on your back to sitting on the side of a flat bed without using bedrails: A little  Moving to and from bed to chair (including a wheelchair): A little  Standing up from a chair using your arms (e.g. wheelchair or bedside chair): A little  To walk in hospital room: A little  Climbing 3-5 steps with railing: Total  Basic Mobility - Total Score: 16    Education Documentation  Mobility Training, taught by Gianna D'Amico, S-PTA at 4/16/2025 11:12 AM.  Learner: Patient  Readiness: Acceptance  Method: Explanation  Response: Verbalizes Understanding    Precautions, taught by Gianna D'Amico, S-PTA at 4/16/2025 11:12 AM.  Learner: Patient  Readiness: Acceptance  Method: Explanation  Response: Verbalizes Understanding    ADL Training, taught by Gianna D'Amico, S-PTA at 4/16/2025 11:12 AM.  Learner: Patient  Readiness: Acceptance  Method: Explanation  Response: Verbalizes Understanding    Education Comments  No comments found.         Encounter Problems       Encounter Problems (Active)       Balance       STG - Maintains static standing balance without upper extremity support and Supervision (Progressing)       Start:  04/05/25    Expected End:  04/19/25       INTERVENTIONS:1. Practice standing with minimal support.2. Educate patient about maintaining total hip precautions while maintaining balance.3. Educate patient about standing tolerance.4. Educate patient about independence with gait, transfers, and ADL's.5. Educate patient about use of assistive device.6. Educate patient about self-directed care.            Mobility       STG - Patient will ambulate  50ft with FWW safely with Supervision (Progressing)       Start:  04/05/25    Expected End:  04/19/25               PT Transfers       STG - Patient will transfer sit to and from stand using fWW with safe technique and Supervision (Progressing)       Start:  04/05/25    Expected End:  04/19/25               Pain - Adult            Gianna D'Amico, S-PTA

## 2025-04-16 NOTE — PROGRESS NOTES
"Occupational Therapy    OT Treatment    Patient Name: Sylvia Moody  MRN: [id number]  Department: [facility]  Room: 2311/2311-B  Today's Date: 4/16/2025  Time Calculation  Start Time: 1102  Stop Time: 1125  Time Calculation (min): 23 min        Assessment:  End of Session Patient Position: Up in chair, Alarm on     Plan:  Treatment Interventions: ADL retraining, Functional transfer training, UE strengthening/ROM, Endurance training, Patient/family training, Cognitive reorientation, Neuromuscular reeducation, Equipment evaluation/education  OT Frequency: 3 times per week  OT Discharge Recommendations: Moderate intensity level of continued care  Equipment Recommended upon Discharge: Other (comment) (TBD)  OT - OK to Discharge: Yes  Treatment Interventions: ADL retraining, Functional transfer training, UE strengthening/ROM, Endurance training, Patient/family training, Cognitive reorientation, Neuromuscular reeducation, Equipment evaluation/education    Subjective   Previous Visit Info:  OT Last Visit  OT Received On: 04/16/25  General:  General  Prior to Session Communication: Bedside nurse  Patient Position Received: Up in chair, Alarm on  General Comment: No c/o pain or dizziness. (Patient oriented to first name. patient stated a different last name, but with cues was able to recall current last name.)  Precautions:  Medical Precautions: Fall precautions    Pain:  Pain Assessment  0-10 (Numeric) Pain Score: 0 - No pain    Objective    Cognition:  Cognition  Orientation Level: Disoriented to situation, Disoriented to time (able to recall hospital, but states \"[place]\" for city.)     Activities of Daily Living: Grooming  Grooming Level of Assistance: Contact guard  Grooming Where Assessed: Standing sinkside  Grooming Comments: Patient completed oral care.    LE Dressing  LE Dressing: Yes  Pants Level of Assistance: Minimum assistance  LE Dressing Where Assessed: Chair  LE Dressing Comments: Min assist to thread pants, " pants were too tight to go over hips. Unable to locate larger pants at this time.     Bed Mobility/Transfers: Transfers  Transfer: Yes  Transfer 1  Technique 1: Sit to stand, Stand to sit  Transfer Device 1: Walker  Transfer Level of Assistance 1: Contact guard    Functional Mobility:  Functional Mobility  Functional Mobility Performed: Yes  Functional Mobility 1  Surface 1: Level tile  Device 1: Rolling walker  Assistance 1: Contact guard    Outcome Measures:[facility] Daily Activity  Putting on and taking off regular lower body clothing: A lot  Bathing (including washing, rinsing, drying): A lot  Putting on and taking off regular upper body clothing: A little  Toileting, which includes using toilet, bedpan or urinal: A lot  Taking care of personal grooming such as brushing teeth: A little  Eating Meals: None  Daily Activity - Total Score: 16    Education Documentation  ADL Training, taught by LYLE Lima at 4/16/2025 11:44 AM.  Learner: Patient  Readiness: Acceptance  Method: Explanation  Response: Needs Reinforcement  Comment: patient requires constant cues due to cognition.    Education Comments  No comments found.           Goals:  Encounter Problems       Encounter Problems (Active)       ADLs       Patient will perform UB and LB bathing with minimal assist  level of assistance and PRN DME/AE. (Progressing)       Start:  04/05/25    Expected End:  04/18/25            Patient with complete upper body dressing with set-up level of assistance donning and doffing all UE clothes with PRN adaptive equipment  (Progressing)       Start:  04/05/25    Expected End:  04/18/25            Patient with complete lower body dressing with supervision level of assistance donning and doffing all LE clothes  with PRN adaptive equipment  (Progressing)       Start:  04/05/25    Expected End:  04/18/25            Patient will feed self with modified independent level of assistance and  using PRN adaptive equipment.  (Progressing)       Start:  04/05/25    Expected End:  04/18/25            Patient will complete daily grooming tasks brushing teeth and washing face/hair with set-up level of assistance and PRN adaptive equipment . (Progressing)       Start:  04/05/25    Expected End:  04/18/25            Patient will complete toileting including hygiene clothing management/hygiene with stand by assist level of assistance. (Progressing)       Start:  04/05/25    Expected End:  04/18/25

## 2025-04-17 PROCEDURE — 2500000004 HC RX 250 GENERAL PHARMACY W/ HCPCS (ALT 636 FOR OP/ED): Mod: JZ

## 2025-04-17 PROCEDURE — 2500000001 HC RX 250 WO HCPCS SELF ADMINISTERED DRUGS (ALT 637 FOR MEDICARE OP): Performed by: HOSPITALIST

## 2025-04-17 PROCEDURE — 99232 SBSQ HOSP IP/OBS MODERATE 35: CPT | Performed by: HOSPITALIST

## 2025-04-17 PROCEDURE — 96372 THER/PROPH/DIAG INJ SC/IM: CPT

## 2025-04-17 PROCEDURE — 2500000001 HC RX 250 WO HCPCS SELF ADMINISTERED DRUGS (ALT 637 FOR MEDICARE OP)

## 2025-04-17 PROCEDURE — 2500000001 HC RX 250 WO HCPCS SELF ADMINISTERED DRUGS (ALT 637 FOR MEDICARE OP): Performed by: EMERGENCY MEDICINE

## 2025-04-17 PROCEDURE — G0378 HOSPITAL OBSERVATION PER HR: HCPCS

## 2025-04-17 RX ADMIN — HYDRALAZINE HYDROCHLORIDE 10 MG: 10 TABLET ORAL at 07:59

## 2025-04-17 RX ADMIN — ASPIRIN 81 MG: 81 TABLET, COATED ORAL at 07:59

## 2025-04-17 RX ADMIN — ENOXAPARIN SODIUM 30 MG: 30 INJECTION SUBCUTANEOUS at 15:45

## 2025-04-17 RX ADMIN — LISINOPRIL 10 MG: 10 TABLET ORAL at 07:59

## 2025-04-17 RX ADMIN — ATORVASTATIN CALCIUM 20 MG: 20 TABLET, FILM COATED ORAL at 20:40

## 2025-04-17 ASSESSMENT — COGNITIVE AND FUNCTIONAL STATUS - GENERAL
CLIMB 3 TO 5 STEPS WITH RAILING: A LOT
EATING MEALS: A LITTLE
PERSONAL GROOMING: A LITTLE
HELP NEEDED FOR BATHING: A LOT
DRESSING REGULAR LOWER BODY CLOTHING: A LOT
TOILETING: A LOT
MOBILITY SCORE: 17
DAILY ACTIVITIY SCORE: 15
DRESSING REGULAR UPPER BODY CLOTHING: A LITTLE
STANDING UP FROM CHAIR USING ARMS: A LITTLE
WALKING IN HOSPITAL ROOM: A LOT
MOVING TO AND FROM BED TO CHAIR: A LOT

## 2025-04-17 ASSESSMENT — PAIN SCALES - GENERAL: PAINLEVEL_OUTOF10: 0 - NO PAIN

## 2025-04-17 NOTE — PROGRESS NOTES
Sylvia Johnson [id number]   Service: Internal Medicine / Hospitalist Date of service: 4/17/2025                                  DNR and No Intubation                        Subjective       History Of Present Illness (HPI):  Sylvia Moody is a 95 y.o. female with PMHx s/f CAD s/p prior CABG, HTN, DLD presenting with unwitnessed fall. She is a poor historian and doesn't remember what happened, most history obtained by chart review.  Per ambulance record, EMS was requested for patient with a fall and had a leg injury.  On EMS arrival family stated that she had fallen and was able to get her up into a chair.  Her family member thought she may have a broken hip as her leg appeared outwardly rotated.  She is acting normal for her per family and is usually ANO x 2 at baseline.  She is unable to recount how she fell or how long she was down or if she hit her head.  Denies headache, neck pain, chest pain, SOB, dysuria, focal or lateralizing weakness.     ED Course:   Vitals on presentation: T 36.4 °C (97.5 °F)  HR 52  BP (!) 188/84  RR 18  O2 99 % None (Room air)  Labs:   CMP, CBC largely unremarkable  Lactate 0.6  Troponin 66 BNP 42  UA-leukocyte esterase 500, WBC 21-50  EKG: Sinus rhythm at 77 bpm, RBBB and LAFB.  , QTc 502.  Imaging - agree with radiology interpretation(s):   XR tib-fib left, pelvis-no acute osseous abnormality denies generative changes  CT head-, CT cervical spine-no acute intracranial abnormality.  Encephalomalacia in the right hemisphere similar compared to prior imaging with remote infarct.  Nonspecific scattered white matter hypodensities favoring sequela small vessel ischemia.  Cervical spondylosis.  XR chest-no acute abnormality  XR shoulder left-degenerative changes and osteopenia  Interventions: Tylenol 650 mg, aspirin 81 mg,  ml, started on Keflex 500 mg every 12 hours, admission for further management.     4/7:              Today the patient is found awake alert and  interactive appropriately sitting in the bedside chair.  Complains only of feeling tired and weak.  No acute events overnight.  Her Coreg was held yesterday due to bradycardia.  Continues today with heart rate ranging from 54-79.  Asymptomatic with this.  Blood pressure stable.  Otherwise denies interval new symptoms or complaints including chest pain palpitations pleuritic type pain unusual shortness of breath cough sputum nausea abdominal pain flank pain fever or chills.     4/8:              Today the patient remains awake alert and interactive appropriately in bed.  No acute events overnight.  Voices no specific complaints.  Heart rate has remained mildly bradycardic and asymptomatic.  Coreg continues to be held.  Not requiring a sitter.  At this time awaiting authorization for SNF. Otherwise denies interval new symptoms or complaints including chest pain palpitations pleuritic type pain unusual shortness of breath cough sputum nausea abdominal pain flank pain fever or chills     4/9:               Today the patient once again is awake alert and interactive appropriately in bed.  Specific complaints and no acute events overnight.  Heart rate appears improved in the normal range today.  Blood pressures trending up and may need to resume Coreg.  Continuing to await authorization for SNF. Otherwise denies interval new symptoms or complaints including chest pain palpitations pleuritic type pain unusual shortness of breath cough sputum nausea abdominal pain flank pain fever or chills     4/10:               Today the patient is found sleeping but does awaken to name and exam.  Voices no specific complaints and no acute events overnight.  Continues with good heart rate and blood pressure levels.  Notified of insurance denial for SNF and awaiting appeal which likely will be on the weekend.  Otherwise denies interval new symptoms or complaints including chest pain palpitations pleuritic type pain unusual shortness of  breath cough sputum nausea abdominal pain flank pain fever or chills     4/11:               Today patient is seen in the bedside chair more awake alert and interactive appropriately.  Voices no specific complaints and no acute complaints.  Today continues with mild asymptomatic bradycardia.  Continues to do well on room air.  Blood pressure normal and stable.  Continuing at this point to await appeal for SNF. Otherwise denies interval new symptoms or complaints including chest pain palpitations pleuritic type pain unusual shortness of breath cough sputum nausea abdominal pain flank pain fever or chills     4/12:               Today patient seen in bed and remains awake alert and interactive appropriately.  Voices no specific complaints and no acute events overnight.  Continues on room air.  Vital signs otherwise normal and stable. Continuing at this point to await appeal for SNF. Otherwise denies interval new symptoms or complaints including chest pain palpitations pleuritic type pain unusual shortness of breath cough sputum nausea abdominal pain flank pain fever or chills     4/13:                Today patient seen once again in bed.  Sleeping but awakens easily to name and exam.  Interactive at her baseline otherwise.  Voices no specific complaints and no acute events overnight.  Continuing to await appeal for SNF.  Otherwise denies interval new symptoms or complaints including chest pain palpitations pleuritic type pain unusual shortness of breath cough sputum nausea abdominal pain flank pain fever or chills     4/14................  Patient up in chair, disclose no acute complaints mentioned that overall she felt better today.No reported :fevers, chills, headaches, chest pains, nausea or vomiting.     4/15...................Patient seen examined in the  presence of her nurse..No reported headaches, chest pains, dyspnea, nausea, vomiting, fevers or chills.     4/16..........Patient report doing well today voiced  appreciation of her care.  No reported: Headaches, chest pains, nausea, vomiting, fevers, chills or dysuria.    4/17.................The patient had no complaints at the time of evaluation.No reported: Dyspnea, chest pains, nausea, vomiting, fevers or chills.        Review of Systems:   Review of system otherwise negative if not aforementioned above in subjective.         chills           Objective        Physical Exam      Constitutional:       Appearance: Patient appeared in no acute cardiopulmonary distress.     Comments: Patient alert and oriented to person place  and situation  to some degree it appeared.Known chronic cognitive impairmentPatient appeared nontoxic.  HEENT:      Head: Normocephalic and atraumatic.Trachea midline      Nose:No observed congestion or rhinorrhea.     Mouth/Throat: Mucous membranes Moist, Trachea appeared  midline.  Eyes:      Extraocular Movements: Extraocular movements intact.      Pupils: Pupils are equal, round, and reactive to light.      Comments: No scleral icterus or conjunctival injection appreciated.   Cardiovascular:      Rate and Rhythm: Normal rate and regular rhythm. No clicks rubs or gallops, normal S1 and S2.No peripheral stigmata of endocarditis appreciated.  Comment: Murmur 2 out of 6     Pulmonary:      Lungs appeared clear to auscultation, no adventitious sound appreciated.  Abdominal:      General: Abdomen soft, nontender, active bowel sounds, no involuntary guarding or rebound tenderness appreciated.     Comments: None   Musculoskeletal:       No pitting edema or cyanosis appreciated.       Skin:     General: Skin is warm.      Coloration:  No jaundice     Findings: No abnormal appearing skin rashes or lesions that appeared acute noted on unclothed area of the skin..   Neurological:      General: No  new appearing focal sensory or motor deficits appreciated, no meningeal signs or dysmetria noted.   Cranial Nerves: Cranial nerves II to XII appearing grossly  intact.  Comment: Patient with what appears to be chronic hearing difficulties however still appeared to have difficulties following the stream of thought, clinical suspicion for any chronic cognitive impairment.     Genitals:  Deferred  Psychiatric:         The patient appears to be displaying normal mood and affect at the time of evaluation.     Labs:            .       Lab Results   Component Value Date     GLUCOSE 100 (H) 04/15/2025     CALCIUM 9.8 04/15/2025      04/15/2025     K 4.7 04/15/2025     CO2 22 04/15/2025      (H) 04/15/2025     BUN 29 (H) 04/15/2025     CREATININE 1.40 (H) 04/15/2025                     Lab Results   Component Value Date     GLUCOSE 76 04/16/2025     CALCIUM 10.3 04/16/2025      04/16/2025     K 4.5 04/16/2025     CO2 23 04/16/2025      04/16/2025     BUN 22 04/16/2025     CREATININE 1.23 (H) 04/16/2025                                                                [unfilled]   [unfilled]   No results found for the last 90 days.                  X-rays/ Images     [unfilled]   Radiology Results (last 21 days)     Procedure Component Value Units Date/Time   XR shoulder left 2+ views [[id number]] Collected: 04/04/25 2329   Order Status: Completed Updated: 04/05/25 0005   Narrative:     STUDY:  Shoulder Radiographs; 04/04/2025 11:15  INDICATION:  Left shoulder pain.  COMPARISON:  None available.  ACCESSION NUMBER(S):  [id number]  ORDERING CLINICIAN:  LIZBETH HAYES  TECHNIQUE:  Two view(s) of the left shoulder.  FINDINGS:    There is no displaced fracture.  There is osteopenia with advanced  degenerative changes of the glenohumeral joint and acromial clavicular  joint.  There is calcific tendinopathy supraspinatus.  There is  osteopenia.  Clavicle is intact.  There is soft tissue swelling.   Impression:     Advanced degenerative changes and osteopenia with calcific  tendinopathy supraspinatus tendon.  Signed by Tani Abraham DO   CT cervical spine wo  IV contrast [[id number]] Collected: 04/04/25 2234   Order Status: Completed Updated: 04/04/25 2234   Narrative:     Interpreted By:  Finkelstein, Evan,  STUDY:  CT HEAD WO IV CONTRAST; CT CERVICAL SPINE WO IV CONTRAST;  4/4/2025  9:49 pm      INDICATION:  Signs/Symptoms:unwitnessed fall; Signs/Symptoms:unwithnessed fall.          COMPARISON:  CT brain 12/15/2021      ACCESSION NUMBER(S):  [id number]; [id number]      ORDERING CLINICIAN:  DOT HUSTON      TECHNIQUE:  Noncontrast CT images of the head with coronal and sagittal  reconstructions. Axial noncontrast CT images of the cervical spine  with coronal and sagittal reconstructed images.      FINDINGS:  EXTRACRANIAL SOFT TISSUES: Unremarkable.      CALVARIUM: No depressed skull fracture. No destructive osseous lesion.      PARANASAL SINUSES/MASTOIDS: The visualized paranasal sinuses and  mastoid air cells are aerated.      HEMORRHAGE: No acute intracranial hemorrhage.      BRAIN PARENCHYMA: Gray-white matter interfaces are preserved. No mass  effect or midline shift. Encephalomalacia in the right hemisphere  similar compared to prior imaging compatible with remote infarct.  Additional nonspecific scattered white matter hypodensities are again  seen.      VENTRICLES and EXTRA-AXIAL SPACES: Parenchymal atrophy with  prominence of the ventricles and cortical sulci.      OTHER FINDINGS: There are calcifications within the cavernous carotids      CERVICAL SPINE:      ALIGNMENT: Straightening of the normal cervical lordosis, which may  be positional or related to spasm. Grade 1 anterolisthesis C4 on C5  and C5 on C6. VERTEBRAE: No acute loss of vertebral body height.  Bones are demineralized. DISC SPACE: Disc space narrowing C5/C6 and  C6/C7. SPINAL CANAL: Multilevel facet and uncovertebral arthropathy  with varying degrees of neural foraminal stenosis. Posterior disc  osteophyte complexes are most pronounced at C5/C6 and C6/C7 and  contribute to moderate  central narrowing. PREVERTEBRAL SOFT TISSUES:  No prevertebral soft tissue swelling. LUNG APICES: Imaged portion of  the lung apices are within normal limits.      OTHER FINDINGS: None.       Impression:         No acute intracranial hemorrhage, mass effect or midline shift.      Encephalomalacia in the right hemisphere similar compared to prior  imaging is compatible with remote infarct. Additional nonspecific  scattered white matter hypodensities favored to represent sequela of  small vessel ischemia. Cervical spondylosis without acute loss of  vertebral body height or traumatic malalignment.          MACRO:  None.      Signed by: Evan Finkelstein 4/4/2025 10:33 PM  Dictation workstation:   [id number]   CT head wo IV contrast [[id number]] Collected: 04/04/25 2234   Order Status: Completed Updated: 04/04/25 2234   Narrative:     Interpreted By:  Finkelstein, Evan,  STUDY:  CT HEAD WO IV CONTRAST; CT CERVICAL SPINE WO IV CONTRAST;  4/4/2025  9:49 pm      INDICATION:  Signs/Symptoms:unwitnessed fall; Signs/Symptoms:unwithnessed fall.          COMPARISON:  CT brain 12/15/2021      ACCESSION NUMBER(S):  [id number]; [id number]      ORDERING CLINICIAN:  DOT HUSTON      TECHNIQUE:  Noncontrast CT images of the head with coronal and sagittal  reconstructions. Axial noncontrast CT images of the cervical spine  with coronal and sagittal reconstructed images.      FINDINGS:  EXTRACRANIAL SOFT TISSUES: Unremarkable.      CALVARIUM: No depressed skull fracture. No destructive osseous lesion.      PARANASAL SINUSES/MASTOIDS: The visualized paranasal sinuses and  mastoid air cells are aerated.      HEMORRHAGE: No acute intracranial hemorrhage.      BRAIN PARENCHYMA: Gray-white matter interfaces are preserved. No mass  effect or midline shift. Encephalomalacia in the right hemisphere  similar compared to prior imaging compatible with remote infarct.  Additional nonspecific scattered white matter hypodensities are  again  seen.      VENTRICLES and EXTRA-AXIAL SPACES: Parenchymal atrophy with  prominence of the ventricles and cortical sulci.      OTHER FINDINGS: There are calcifications within the cavernous carotids      CERVICAL SPINE:      ALIGNMENT: Straightening of the normal cervical lordosis, which may  be positional or related to spasm. Grade 1 anterolisthesis C4 on C5  and C5 on C6. VERTEBRAE: No acute loss of vertebral body height.  Bones are demineralized. DISC SPACE: Disc space narrowing C5/C6 and  C6/C7. SPINAL CANAL: Multilevel facet and uncovertebral arthropathy  with varying degrees of neural foraminal stenosis. Posterior disc  osteophyte complexes are most pronounced at C5/C6 and C6/C7 and  contribute to moderate central narrowing. PREVERTEBRAL SOFT TISSUES:  No prevertebral soft tissue swelling. LUNG APICES: Imaged portion of  the lung apices are within normal limits.      OTHER FINDINGS: None.       Impression:         No acute intracranial hemorrhage, mass effect or midline shift.      Encephalomalacia in the right hemisphere similar compared to prior  imaging is compatible with remote infarct. Additional nonspecific  scattered white matter hypodensities favored to represent sequela of  small vessel ischemia. Cervical spondylosis without acute loss of  vertebral body height or traumatic malalignment.          MACRO:  None.      Signed by: Evan Finkelstein 4/4/2025 10:33 PM  Dictation workstation:   [id number]   XR chest 2 views [[id number]] Collected: 04/04/25 2308   Order Status: Completed Updated: 04/04/25 2315   Narrative:     STUDY:  Chest Radiographs;  4/4/2025 9:37PM  INDICATION:  Fall.  COMPARISON:  XR Chest: 2/13/2023  ACCESSION NUMBER(S):  [id number]  ORDERING CLINICIAN:  DOT HUSTON  TECHNIQUE:  Frontal and lateral chest.  FINDINGS:  CARDIOMEDIASTINAL SILHOUETTE:  Cardiomediastinal silhouette is mildly prominent and unchanged..     LUNGS:  Lungs are clear.     ABDOMEN:  No remarkable  upper abdominal findings.     BONES:  No acute osseous changes.  Advanced degenerative changes are  demonstrated of both shoulder joints.   Impression:     No acute abnormality.  Signed by Rush Martin DO   XR tibia fibula left 2 views [[id number]] Collected: 04/04/25 2158   Order Status: Completed Updated: 04/04/25 2204   Narrative:     STUDY:  Tibia and Fibula Radiographs; 04/004/2025 9:36 PM  INDICATION:  Fall.  COMPARISON:  None.  ACCESSION NUMBER(S):  [id number]  ORDERING CLINICIAN:  DOT HUSTON  TECHNIQUE:  Two view(s) of the left tibia and fibula.  FINDINGS:    There is no displaced fracture.  The alignment is anatomic.  No soft  tissue abnormality is seen. There are degenerative changes of the knee  and ankle joint   Impression:     No acute osseous abnormality.  Degenerative changes.  Signed by Davin Doyle MD   XR pelvis 1-2 views [[id number]] Collected: 04/04/25 2159   Order Status: Completed Updated: 04/04/25 2205   Narrative:     STUDY:  Pelvis Radiographs; 04/04/2025 9:36 PM  INDICATION:  Fall.  COMPARISON:  XR right hip pelvis 12/15/2021.  ACCESSION NUMBER(S):  [id number]  ORDERING CLINICIAN:  DOT HUSTON  TECHNIQUE:  Single view of the pelvis.  FINDINGS:    The pelvic ring is intact.  There is no acute fracture. Bilateral  degenerative changes of the hips.   Impression:     No acute osseous abnormality.  Bilateral degenerative changes of the hips..  Signed by Davin Doyle MD                  Medical Problems         Problem List         * (Principal) Generalized weakness     Arteriosclerotic coronary artery disease     Benign essential hypertension     Hyperlipidemia     Volvulus of colon (Multi)     Vascular insufficiency of intestine (Multi)     Sensorineural hearing loss (SNHL) of both ears                  Above medical problems may be reflective of historical medical problems that may have resolved and may not related to acute clinical condition/medical problems.      Clinical impression/plan:  Expand All Collapse All    SUBJECTIVE      Pt requiring sitter due to impulsivity  Has been having visual hallucinations     4/7:              Today the patient is found awake alert and interactive appropriately sitting in the bedside chair.  Complains only of feeling tired and weak.  No acute events overnight.  Her Coreg was held yesterday due to bradycardia.  Continues today with heart rate ranging from 54-79.  Asymptomatic with this.  Blood pressure stable.  Otherwise denies interval new symptoms or complaints including chest pain palpitations pleuritic type pain unusual shortness of breath cough sputum nausea abdominal pain flank pain fever or chills.     4/8:              Today the patient remains awake alert and interactive appropriately in bed.  No acute events overnight.  Voices no specific complaints.  Heart rate has remained mildly bradycardic and asymptomatic.  Coreg continues to be held.  Not requiring a sitter.  At this time awaiting authorization for SNF. Otherwise denies interval new symptoms or complaints including chest pain palpitations pleuritic type pain unusual shortness of breath cough sputum nausea abdominal pain flank pain fever or chills     4/9:               Today the patient once again is awake alert and interactive appropriately in bed.  Specific complaints and no acute events overnight.  Heart rate appears improved in the normal range today.  Blood pressures trending up and may need to resume Coreg.  Continuing to await authorization for SNF. Otherwise denies interval new symptoms or complaints including chest pain palpitations pleuritic type pain unusual shortness of breath cough sputum nausea abdominal pain flank pain fever or chills     4/10:               Today the patient is found sleeping but does awaken to name and exam.  Voices no specific complaints and no acute events overnight.  Continues with good heart rate and blood pressure levels.  Notified  "of insurance denial for SNF and awaiting appeal which likely will be on the weekend.  Otherwise denies interval new symptoms or complaints including chest pain palpitations pleuritic type pain unusual shortness of breath cough sputum nausea abdominal pain flank pain fever or chills     4/11:               Today patient is seen in the bedside chair more awake alert and interactive appropriately.  Voices no specific complaints and no acute complaints.  Today continues with mild asymptomatic bradycardia.  Continues to do well on room air.  Blood pressure normal and stable.  Continuing at this point to await appeal for SNF. Otherwise denies interval new symptoms or complaints including chest pain palpitations pleuritic type pain unusual shortness of breath cough sputum nausea abdominal pain flank pain fever or chills     4/12:               Today patient seen in bed and remains awake alert and interactive appropriately.  Voices no specific complaints and no acute events overnight.  Continues on room air.  Vital signs otherwise normal and stable. Continuing at this point to await appeal for SNF. Otherwise denies interval new symptoms or complaints including chest pain palpitations pleuritic type pain unusual shortness of breath cough sputum nausea abdominal pain flank pain fever or chills     4/13:                Today patient seen once again in bed.  Sleeping but awakens easily to name and exam.  Interactive at her baseline otherwise.  Voices no specific complaints and no acute events overnight.  Continuing to await appeal for SNF.  Otherwise denies interval new symptoms or complaints including chest pain palpitations pleuritic type pain unusual shortness of breath cough sputum nausea abdominal pain flank pain fever or chills        OBJECTIVE:         Physical Exam  /62 (BP Location: Right arm, Patient Position: Lying)   Pulse 59   Temp 35.9 °C (96.7 °F) (Temporal)   Resp 18   Ht 1.651 m (5' 5\")   Wt 74.8 kg " "(164 lb 14.5 oz)   SpO2 97%   BMI 27.44 kg/m²   Body mass index is 27.44 kg/m².     GEN - NAD, slender elderly  female awake alert and interactive appropriately  PULM - CTA  CVS - RRR, normal rate, 2/6 murmur  ABD - soft and nontender  EXTR - trace bilat LE edema  Psych: Pleasant and cooperative to exam     Scheduled medications     Scheduled Medications   aspirin, 81 mg, oral, Daily  atorvastatin, 20 mg, oral, Nightly  [Held by provider] carvedilol, 3.125 mg, oral, BID  enoxaparin, 30 mg, subcutaneous, q24h  lisinopril, 10 mg, oral, Daily  pantoprazole, 40 mg, oral, Daily before breakfast   Or  pantoprazole, 40 mg, intravenous, Daily before breakfast         Continuous medications  Continuous Medications          PRN medications  PRN Medications   PRN medications: acetaminophen, guaiFENesin, LORazepam, melatonin, ondansetron, polyethylene glycol         Labs          Results from last 7 days   Lab Units 04/08/25  0425   WBC AUTO x10*3/uL 6.0   HEMOGLOBIN g/dL 9.6*   HEMATOCRIT % 30.8*   PLATELETS AUTO x10*3/uL 260                Results from last 7 days   Lab Units 04/09/25  0449 04/08/25  0425   SODIUM mmol/L 137 138   POTASSIUM mmol/L 4.3 4.0   CHLORIDE mmol/L 106 107   CO2 mmol/L 25 25   BUN mg/dL 24* 22   CREATININE mg/dL 1.19* 1.29*   CALCIUM mg/dL 9.7 8.9   GLUCOSE mg/dL 80 88            Microbiology:   No results found for the last 90 days.                     No lab exists for component: \"AGALPCRNB\"   .ID              Lab Results   Component Value Date     URINECULTURE   04/05/2025       Growth indicates contamination with periurethral roly. Repeat culture if clinically indicated.            ASSESSMENT & PLAN:      1)  Fall/Debility  -  no fractures or acute injuries found during workup  - PT eval pending and will likely need SNF.  [facility] - even if not qualifying for SNF it sounds like she will need placement due to presumed dementia and unsafe living independently  - sounds like family " has 24/7 caregivers and has cameras in the home for safety  - no other acute metabolic or infectious etiologies found  4/7: AM-PAC 15.  Working towards SNF.  4/13: Initially denied by insurance.  Transitional care pursuing appeal.     2)  Possible UTI  -  empiric Keflex started but culture suggests contamination >> will stop abx     3) CAD/HTN/bradycardia  -  resumed home meds  - has been bradycardic and will hold coreg  4/7: Continues today with mild asymptomatic bradycardia.    4/9: Heart rates primarily good range and normal today.  Blood pressure perhaps starting to trend up and may need to resume Coreg.  Continue to monitor.               Disposition/additional care plan/interventions:4/14/2025     Patient with advanced age despite appearing stable at this juncture appear to remained at risk for clinical decline and deterioration.     Will discussed with TCC concerning discharge disposition/planning.     Continue to monitor off antibiotics.     Bradycardia resolved................ Monitor off Coreg     CAD continue current longitudinal care     Monitor blood pressure at risk for rebound hypertension with antihypertensive therapy on hold secondary to bradycardia,.     BMP in a.m. consider possible 15 mg of lisinopril daily if blood pressure continue to escalate, swings in BPs with systolic blood pressure in the 100 range so we will monitor closely.      Disposition/additional care plan/interventions:4/15/2025        Upward trending of creatinine today.....................Oral hydration, monitor renal function.     Hypertension............. Coreg on hold in light of bradycardia, hydralazine 10 mg orally daily added, further increase as clinically warranted.  Given  bradycardia will  not discharged on Coreg     Monitor blood pressure response     Continue supportive care     Discharge planning     . Further recommendations forthcoming in accordance with patient's clinical disposition and response to care.       Disposition/additional care plan/interventions:4/16/2025     Hypertension appears improved, continue monitoring however  Continue once a day dosing of hydralazine at this time.     Creatinine levels improved today     Discharge barrier awaiting appeal before discharge disposition to skilled nursing facility.           Disposition/additional care plan/interventions:4/17/2025    Patient appeared clinically well.    Blood pressure with improved control.      Heart rate at 55 but patient asymptomatic.    Beta-blocker stopped    Continue current antihypertensive therapy    Renal function improved yesterday    Discharge barrier remains: Awaiting appeal for ECF.     Discharge planning: Discharge barrier awaiting appeal determination for skilled nursing facility.                       Dictation performed with assistance of voice recognition device therefore transcription errors are possible.

## 2025-04-17 NOTE — PROGRESS NOTES
4/17/25 1240   04/17/25 1240   Discharge Planning   Home or Post Acute Services Post acute facilities (Rehab/SNF/etc)   Type of Post Acute Facility Services Skilled nursing   Expected Discharge Disposition SNF   Does the patient need discharge transport arranged? Yes   RoundTrip coordination needed? Yes   Patient Choice   Provider Choice list and CMS website (https://medicare.gov/care-compare#search) for post-acute Quality and Resource Measure Data were provided and reviewed with: Family   Patient / Family choosing to utilize agency / facility established prior to hospitalization No   Intensity of Service   Intensity of Service >30 min     Received a call from [employer] [facility] [employer]. They stated that pt was denied d/t not being in network with [facility] [place] which was not relayed to ID team with prior communications with [facility]. Called and spoke with daughter. Daughter in agreement with mass referrals being sent and then follow up with [facility] once responses come through. Daughter requested referrals be sent out to facilities within a 20 mile radius of [place] as this would be easier for family to see pt. Referrals sent. Awaiting responses.   Nanci Mena RN, BSN, Dina/ Tiesha CT Supervisor     4/17/25 8194  Called and updated daughterAnat, on accepting SNFs including their responses. Daughter wants to discuss with brother and then get back to this CT Supervisor. Awaiting call back.   Nanci Mena RN, BSN, Dina/ Tiesha CT Supervisor     4/17/25 4706  Received call back from daughterAnat. They chose [email or web address] as FOC. Notified [facility] and requested that they start auth.   Nanci Mena RN, BSN, Dina/ Tiesha CT Supervisor

## 2025-04-18 PROCEDURE — 96372 THER/PROPH/DIAG INJ SC/IM: CPT

## 2025-04-18 PROCEDURE — 99232 SBSQ HOSP IP/OBS MODERATE 35: CPT | Performed by: HOSPITALIST

## 2025-04-18 PROCEDURE — 2500000001 HC RX 250 WO HCPCS SELF ADMINISTERED DRUGS (ALT 637 FOR MEDICARE OP)

## 2025-04-18 PROCEDURE — 2500000001 HC RX 250 WO HCPCS SELF ADMINISTERED DRUGS (ALT 637 FOR MEDICARE OP): Performed by: EMERGENCY MEDICINE

## 2025-04-18 PROCEDURE — 97535 SELF CARE MNGMENT TRAINING: CPT | Mod: GO,CO

## 2025-04-18 PROCEDURE — G0378 HOSPITAL OBSERVATION PER HR: HCPCS

## 2025-04-18 PROCEDURE — 2500000001 HC RX 250 WO HCPCS SELF ADMINISTERED DRUGS (ALT 637 FOR MEDICARE OP): Performed by: INTERNAL MEDICINE

## 2025-04-18 PROCEDURE — 96374 THER/PROPH/DIAG INJ IV PUSH: CPT | Mod: 59

## 2025-04-18 PROCEDURE — 2500000004 HC RX 250 GENERAL PHARMACY W/ HCPCS (ALT 636 FOR OP/ED): Performed by: INTERNAL MEDICINE

## 2025-04-18 PROCEDURE — 2500000004 HC RX 250 GENERAL PHARMACY W/ HCPCS (ALT 636 FOR OP/ED): Mod: JZ

## 2025-04-18 RX ORDER — LORAZEPAM 2 MG/ML
0.5 INJECTION INTRAMUSCULAR ONCE
Status: COMPLETED | OUTPATIENT
Start: 2025-04-18 | End: 2025-04-18

## 2025-04-18 RX ORDER — DOCUSATE SODIUM 100 MG/1
100 CAPSULE, LIQUID FILLED ORAL 2 TIMES DAILY
Status: DISCONTINUED | OUTPATIENT
Start: 2025-04-18 | End: 2025-04-21 | Stop reason: HOSPADM

## 2025-04-18 RX ADMIN — LORAZEPAM 0.5 MG: 0.5 TABLET ORAL at 08:34

## 2025-04-18 RX ADMIN — ENOXAPARIN SODIUM 30 MG: 30 INJECTION SUBCUTANEOUS at 16:24

## 2025-04-18 RX ADMIN — ASPIRIN 81 MG: 81 TABLET, COATED ORAL at 08:32

## 2025-04-18 RX ADMIN — DOCUSATE SODIUM 100 MG: 100 CAPSULE, LIQUID FILLED ORAL at 21:32

## 2025-04-18 RX ADMIN — LORAZEPAM 0.5 MG: 0.5 TABLET ORAL at 17:35

## 2025-04-18 RX ADMIN — LORAZEPAM 0.5 MG: 0.5 TABLET ORAL at 01:40

## 2025-04-18 RX ADMIN — ATORVASTATIN CALCIUM 20 MG: 20 TABLET, FILM COATED ORAL at 20:14

## 2025-04-18 RX ADMIN — LORAZEPAM 0.5 MG: 2 INJECTION INTRAMUSCULAR; INTRAVENOUS at 21:36

## 2025-04-18 ASSESSMENT — PAIN - FUNCTIONAL ASSESSMENT
PAIN_FUNCTIONAL_ASSESSMENT: 0-10
PAIN_FUNCTIONAL_ASSESSMENT: 0-10

## 2025-04-18 ASSESSMENT — COGNITIVE AND FUNCTIONAL STATUS - GENERAL
HELP NEEDED FOR BATHING: A LOT
DAILY ACTIVITIY SCORE: 18
TOILETING: A LOT
DRESSING REGULAR LOWER BODY CLOTHING: A LITTLE
DRESSING REGULAR UPPER BODY CLOTHING: A LITTLE

## 2025-04-18 ASSESSMENT — PAIN SCALES - WONG BAKER: WONGBAKER_NUMERICALRESPONSE: NO HURT

## 2025-04-18 ASSESSMENT — ACTIVITIES OF DAILY LIVING (ADL): HOME_MANAGEMENT_TIME_ENTRY: 24

## 2025-04-18 ASSESSMENT — PAIN SCALES - GENERAL
PAINLEVEL_OUTOF10: 0 - NO PAIN

## 2025-04-18 NOTE — PROGRESS NOTES
4/18/25 1505   04/18/25 1504   Discharge Planning   Home or Post Acute Services Post acute facilities (Rehab/SNF/etc)   Type of Post Acute Facility Services Skilled nursing   Expected Discharge Disposition SNF   Intensity of Service   Intensity of Service 0-30 min     Attempted to call daughter, Anat, to provide updates. No answer. Still awaiting auth for [facility]. Pt is med ready when this returns. This was escalated early this am with our DSC team. Awaiting insurance approval at this time.   Nanci Mena RN, BSN, Dina/ Tiesha CT Supervisor

## 2025-04-18 NOTE — PROGRESS NOTES
Sylvia Johnson [id number]   Service: Internal Medicine / Hospitalist Date of service: 4/18/2025                                  DNR and No Intubation                        Subjective       History Of Present Illness (HPI):  Sylvia Moody is a 95 y.o. female with PMHx s/f CAD s/p prior CABG, HTN, DLD presenting with unwitnessed fall. She is a poor historian and doesn't remember what happened, most history obtained by chart review.  Per ambulance record, EMS was requested for patient with a fall and had a leg injury.  On EMS arrival family stated that she had fallen and was able to get her up into a chair.  Her family member thought she may have a broken hip as her leg appeared outwardly rotated.  She is acting normal for her per family and is usually ANO x 2 at baseline.  She is unable to recount how she fell or how long she was down or if she hit her head.  Denies headache, neck pain, chest pain, SOB, dysuria, focal or lateralizing weakness.     ED Course:   Vitals on presentation: T 36.4 °C (97.5 °F)  HR 52  BP (!) 188/84  RR 18  O2 99 % None (Room air)  Labs:   CMP, CBC largely unremarkable  Lactate 0.6  Troponin 66 BNP 42  UA-leukocyte esterase 500, WBC 21-50  EKG: Sinus rhythm at 77 bpm, RBBB and LAFB.  , QTc 502.  Imaging - agree with radiology interpretation(s):   XR tib-fib left, pelvis-no acute osseous abnormality denies generative changes  CT head-, CT cervical spine-no acute intracranial abnormality.  Encephalomalacia in the right hemisphere similar compared to prior imaging with remote infarct.  Nonspecific scattered white matter hypodensities favoring sequela small vessel ischemia.  Cervical spondylosis.  XR chest-no acute abnormality  XR shoulder left-degenerative changes and osteopenia  Interventions: Tylenol 650 mg, aspirin 81 mg,  ml, started on Keflex 500 mg every 12 hours, admission for further management.     4/7:              Today the patient is found awake alert and  interactive appropriately sitting in the bedside chair.  Complains only of feeling tired and weak.  No acute events overnight.  Her Coreg was held yesterday due to bradycardia.  Continues today with heart rate ranging from 54-79.  Asymptomatic with this.  Blood pressure stable.  Otherwise denies interval new symptoms or complaints including chest pain palpitations pleuritic type pain unusual shortness of breath cough sputum nausea abdominal pain flank pain fever or chills.     4/8:              Today the patient remains awake alert and interactive appropriately in bed.  No acute events overnight.  Voices no specific complaints.  Heart rate has remained mildly bradycardic and asymptomatic.  Coreg continues to be held.  Not requiring a sitter.  At this time awaiting authorization for SNF. Otherwise denies interval new symptoms or complaints including chest pain palpitations pleuritic type pain unusual shortness of breath cough sputum nausea abdominal pain flank pain fever or chills     4/9:               Today the patient once again is awake alert and interactive appropriately in bed.  Specific complaints and no acute events overnight.  Heart rate appears improved in the normal range today.  Blood pressures trending up and may need to resume Coreg.  Continuing to await authorization for SNF. Otherwise denies interval new symptoms or complaints including chest pain palpitations pleuritic type pain unusual shortness of breath cough sputum nausea abdominal pain flank pain fever or chills     4/10:               Today the patient is found sleeping but does awaken to name and exam.  Voices no specific complaints and no acute events overnight.  Continues with good heart rate and blood pressure levels.  Notified of insurance denial for SNF and awaiting appeal which likely will be on the weekend.  Otherwise denies interval new symptoms or complaints including chest pain palpitations pleuritic type pain unusual shortness of  breath cough sputum nausea abdominal pain flank pain fever or chills     4/11:               Today patient is seen in the bedside chair more awake alert and interactive appropriately.  Voices no specific complaints and no acute complaints.  Today continues with mild asymptomatic bradycardia.  Continues to do well on room air.  Blood pressure normal and stable.  Continuing at this point to await appeal for SNF. Otherwise denies interval new symptoms or complaints including chest pain palpitations pleuritic type pain unusual shortness of breath cough sputum nausea abdominal pain flank pain fever or chills     4/12:               Today patient seen in bed and remains awake alert and interactive appropriately.  Voices no specific complaints and no acute events overnight.  Continues on room air.  Vital signs otherwise normal and stable. Continuing at this point to await appeal for SNF. Otherwise denies interval new symptoms or complaints including chest pain palpitations pleuritic type pain unusual shortness of breath cough sputum nausea abdominal pain flank pain fever or chills     4/13:                Today patient seen once again in bed.  Sleeping but awakens easily to name and exam.  Interactive at her baseline otherwise.  Voices no specific complaints and no acute events overnight.  Continuing to await appeal for SNF.  Otherwise denies interval new symptoms or complaints including chest pain palpitations pleuritic type pain unusual shortness of breath cough sputum nausea abdominal pain flank pain fever or chills     4/14................  Patient up in chair, disclose no acute complaints mentioned that overall she felt better today.No reported :fevers, chills, headaches, chest pains, nausea or vomiting.     4/15...................Patient seen examined in the  presence of her nurse..No reported headaches, chest pains, dyspnea, nausea, vomiting, fevers or chills.     4/16..........Patient report doing well today voiced  appreciation of her care.  No reported: Headaches, chest pains, nausea, vomiting, fevers, chills or dysuria.     4/17.................The patient had no complaints at the time of evaluation.No reported: Dyspnea, chest pains, nausea, vomiting, fevers or chills.    4/18...............Late note entry patient seen and evaluated earlier this morning.  No provisional report of overnight events by nursing.  No acute complaints voiced at the time of evaluation.No reported: Headaches, chest pains, dyspnea, palpitations, nausea, vomiting, fevers or chills.        Review of Systems:   Review of system otherwise negative if not aforementioned above in subjective.         chills           Objective        Physical Exam      Constitutional:       Appearance: Patient appeared in no acute cardiopulmonary distress.     Comments: Patient alert and oriented to person place  and situation  to some degree it appeared.Known chronic cognitive impairmentPatient appeared nontoxic.  HEENT:      Head: Normocephalic and atraumatic.Trachea midline      Nose:No observed congestion or rhinorrhea.     Mouth/Throat: Mucous membranes Moist, Trachea appeared  midline.  Eyes:      Extraocular Movements: Extraocular movements intact.      Pupils: Pupils are equal, round, and reactive to light.      Comments: No scleral icterus or conjunctival injection appreciated.   Cardiovascular:      Rate and Rhythm: Normal rate and regular rhythm. No clicks rubs or gallops, normal S1 and S2.No peripheral stigmata of endocarditis appreciated.  Comment: Murmur 2 out of 6     Pulmonary:      Lungs appeared clear to auscultation, no adventitious sound appreciated.  Abdominal:      General: Abdomen soft, nontender, active bowel sounds, no involuntary guarding or rebound tenderness appreciated.     Comments: None   Musculoskeletal:       No pitting edema or cyanosis appreciated.No acute joint deformity appreciated to per hypertrophy of the range of motion for upper and  lower extremities.       Skin:     General: Skin is warm.      Coloration:  No jaundice     Findings: No abnormal appearing skin rashes or lesions that appeared acute noted on unclothed area of the skin..   Neurological:      General: No  new appearing focal sensory or motor deficits appreciated, no meningeal signs or dysmetria noted.   Cranial Nerves: Cranial nerves II to XII appearing grossly intact.  Comment: Patient with what appears to be chronic hearing difficulties however still appeared to have difficulties following the stream of thought, clinical suspicion for any chronic cognitive impairment.     Genitals:  Deferred  Psychiatric:         The patient appears to be displaying normal mood and affect at the time of evaluation.     Labs:            .       Lab Results   Component Value Date     GLUCOSE 100 (H) 04/15/2025     CALCIUM 9.8 04/15/2025      04/15/2025     K 4.7 04/15/2025     CO2 22 04/15/2025      (H) 04/15/2025     BUN 29 (H) 04/15/2025     CREATININE 1.40 (H) 04/15/2025                         Lab Results   Component Value Date     GLUCOSE 76 04/16/2025     CALCIUM 10.3 04/16/2025      04/16/2025     K 4.5 04/16/2025     CO2 23 04/16/2025      04/16/2025     BUN 22 04/16/2025     CREATININE 1.23 (H) 04/16/2025                                                                [unfilled]   [unfilled]   No results found for the last 90 days.                  X-rays/ Images     [unfilled]   Radiology Results (last 21 days)     Procedure Component Value Units Date/Time   XR shoulder left 2+ views [[id number]] Collected: 04/04/25 2329   Order Status: Completed Updated: 04/05/25 0005   Narrative:     STUDY:  Shoulder Radiographs; 04/04/2025 11:15  INDICATION:  Left shoulder pain.  COMPARISON:  None available.  ACCESSION NUMBER(S):  [id number]  ORDERING CLINICIAN:  LIZBETH HAYES  TECHNIQUE:  Two view(s) of the left shoulder.  FINDINGS:    There is no displaced fracture.  There  is osteopenia with advanced  degenerative changes of the glenohumeral joint and acromial clavicular  joint.  There is calcific tendinopathy supraspinatus.  There is  osteopenia.  Clavicle is intact.  There is soft tissue swelling.   Impression:     Advanced degenerative changes and osteopenia with calcific  tendinopathy supraspinatus tendon.  Signed by Tani Abraham,    CT cervical spine wo IV contrast [[id number]] Collected: 04/04/25 2234   Order Status: Completed Updated: 04/04/25 2234   Narrative:     Interpreted By:  Finkelstein, Evan,  STUDY:  CT HEAD WO IV CONTRAST; CT CERVICAL SPINE WO IV CONTRAST;  4/4/2025  9:49 pm      INDICATION:  Signs/Symptoms:unwitnessed fall; Signs/Symptoms:unwithnessed fall.          COMPARISON:  CT brain 12/15/2021      ACCESSION NUMBER(S):  [id number]; [id number]      ORDERING CLINICIAN:  DOT HUSTON      TECHNIQUE:  Noncontrast CT images of the head with coronal and sagittal  reconstructions. Axial noncontrast CT images of the cervical spine  with coronal and sagittal reconstructed images.      FINDINGS:  EXTRACRANIAL SOFT TISSUES: Unremarkable.      CALVARIUM: No depressed skull fracture. No destructive osseous lesion.      PARANASAL SINUSES/MASTOIDS: The visualized paranasal sinuses and  mastoid air cells are aerated.      HEMORRHAGE: No acute intracranial hemorrhage.      BRAIN PARENCHYMA: Gray-white matter interfaces are preserved. No mass  effect or midline shift. Encephalomalacia in the right hemisphere  similar compared to prior imaging compatible with remote infarct.  Additional nonspecific scattered white matter hypodensities are again  seen.      VENTRICLES and EXTRA-AXIAL SPACES: Parenchymal atrophy with  prominence of the ventricles and cortical sulci.      OTHER FINDINGS: There are calcifications within the cavernous carotids      CERVICAL SPINE:      ALIGNMENT: Straightening of the normal cervical lordosis, which may  be positional or related to  spasm. Grade 1 anterolisthesis C4 on C5  and C5 on C6. VERTEBRAE: No acute loss of vertebral body height.  Bones are demineralized. DISC SPACE: Disc space narrowing C5/C6 and  C6/C7. SPINAL CANAL: Multilevel facet and uncovertebral arthropathy  with varying degrees of neural foraminal stenosis. Posterior disc  osteophyte complexes are most pronounced at C5/C6 and C6/C7 and  contribute to moderate central narrowing. PREVERTEBRAL SOFT TISSUES:  No prevertebral soft tissue swelling. LUNG APICES: Imaged portion of  the lung apices are within normal limits.      OTHER FINDINGS: None.       Impression:         No acute intracranial hemorrhage, mass effect or midline shift.      Encephalomalacia in the right hemisphere similar compared to prior  imaging is compatible with remote infarct. Additional nonspecific  scattered white matter hypodensities favored to represent sequela of  small vessel ischemia. Cervical spondylosis without acute loss of  vertebral body height or traumatic malalignment.          MACRO:  None.      Signed by: Evan Finkelstein 4/4/2025 10:33 PM  Dictation workstation:   [id number]   CT head wo IV contrast [[id number]] Collected: 04/04/25 2234   Order Status: Completed Updated: 04/04/25 2234   Narrative:     Interpreted By:  Finkelstein, Evan,  STUDY:  CT HEAD WO IV CONTRAST; CT CERVICAL SPINE WO IV CONTRAST;  4/4/2025  9:49 pm      INDICATION:  Signs/Symptoms:unwitnessed fall; Signs/Symptoms:unwithnessed fall.          COMPARISON:  CT brain 12/15/2021      ACCESSION NUMBER(S):  [id number]; [id number]      ORDERING CLINICIAN:  DOT HUSTON      TECHNIQUE:  Noncontrast CT images of the head with coronal and sagittal  reconstructions. Axial noncontrast CT images of the cervical spine  with coronal and sagittal reconstructed images.      FINDINGS:  EXTRACRANIAL SOFT TISSUES: Unremarkable.      CALVARIUM: No depressed skull fracture. No destructive osseous lesion.      PARANASAL  SINUSES/MASTOIDS: The visualized paranasal sinuses and  mastoid air cells are aerated.      HEMORRHAGE: No acute intracranial hemorrhage.      BRAIN PARENCHYMA: Gray-white matter interfaces are preserved. No mass  effect or midline shift. Encephalomalacia in the right hemisphere  similar compared to prior imaging compatible with remote infarct.  Additional nonspecific scattered white matter hypodensities are again  seen.      VENTRICLES and EXTRA-AXIAL SPACES: Parenchymal atrophy with  prominence of the ventricles and cortical sulci.      OTHER FINDINGS: There are calcifications within the cavernous carotids      CERVICAL SPINE:      ALIGNMENT: Straightening of the normal cervical lordosis, which may  be positional or related to spasm. Grade 1 anterolisthesis C4 on C5  and C5 on C6. VERTEBRAE: No acute loss of vertebral body height.  Bones are demineralized. DISC SPACE: Disc space narrowing C5/C6 and  C6/C7. SPINAL CANAL: Multilevel facet and uncovertebral arthropathy  with varying degrees of neural foraminal stenosis. Posterior disc  osteophyte complexes are most pronounced at C5/C6 and C6/C7 and  contribute to moderate central narrowing. PREVERTEBRAL SOFT TISSUES:  No prevertebral soft tissue swelling. LUNG APICES: Imaged portion of  the lung apices are within normal limits.      OTHER FINDINGS: None.       Impression:         No acute intracranial hemorrhage, mass effect or midline shift.      Encephalomalacia in the right hemisphere similar compared to prior  imaging is compatible with remote infarct. Additional nonspecific  scattered white matter hypodensities favored to represent sequela of  small vessel ischemia. Cervical spondylosis without acute loss of  vertebral body height or traumatic malalignment.          MACRO:  None.      Signed by: Evan Finkelstein 4/4/2025 10:33 PM  Dictation workstation:   [id number]   XR chest 2 views [[id number]] Collected: 04/04/25 0207   Order Status: Completed Updated:  04/04/25 2315   Narrative:     STUDY:  Chest Radiographs;  4/4/2025 9:37PM  INDICATION:  Fall.  COMPARISON:  XR Chest: 2/13/2023  ACCESSION NUMBER(S):  [id number]  ORDERING CLINICIAN:  DOT HUSTON  TECHNIQUE:  Frontal and lateral chest.  FINDINGS:  CARDIOMEDIASTINAL SILHOUETTE:  Cardiomediastinal silhouette is mildly prominent and unchanged..     LUNGS:  Lungs are clear.     ABDOMEN:  No remarkable upper abdominal findings.     BONES:  No acute osseous changes.  Advanced degenerative changes are  demonstrated of both shoulder joints.   Impression:     No acute abnormality.  Signed by Rush Martin DO   XR tibia fibula left 2 views [[id number]] Collected: 04/04/25 2158   Order Status: Completed Updated: 04/04/25 2204   Narrative:     STUDY:  Tibia and Fibula Radiographs; 04/004/2025 9:36 PM  INDICATION:  Fall.  COMPARISON:  None.  ACCESSION NUMBER(S):  [id number]  ORDERING CLINICIAN:  DOT HUSTON  TECHNIQUE:  Two view(s) of the left tibia and fibula.  FINDINGS:    There is no displaced fracture.  The alignment is anatomic.  No soft  tissue abnormality is seen. There are degenerative changes of the knee  and ankle joint   Impression:     No acute osseous abnormality.  Degenerative changes.  Signed by Davin Doyle MD   XR pelvis 1-2 views [[id number]] Collected: 04/04/25 2159   Order Status: Completed Updated: 04/04/25 2205   Narrative:     STUDY:  Pelvis Radiographs; 04/04/2025 9:36 PM  INDICATION:  Fall.  COMPARISON:  XR right hip pelvis 12/15/2021.  ACCESSION NUMBER(S):  [id number]  ORDERING CLINICIAN:  DOT HUSTON  TECHNIQUE:  Single view of the pelvis.  FINDINGS:    The pelvic ring is intact.  There is no acute fracture. Bilateral  degenerative changes of the hips.   Impression:     No acute osseous abnormality.  Bilateral degenerative changes of the hips..  Signed by Davin Doyle MD                  Medical Problems         Problem List         * (Principal) Generalized  weakness     Arteriosclerotic coronary artery disease     Benign essential hypertension     Hyperlipidemia     Volvulus of colon (Multi)     Vascular insufficiency of intestine (Multi)     Sensorineural hearing loss (SNHL) of both ears                  Above medical problems may be reflective of historical medical problems that may have resolved and may not related to acute clinical condition/medical problems.     Clinical impression/plan:  Expand All Collapse All    SUBJECTIVE      Pt requiring sitter due to impulsivity  Has been having visual hallucinations     4/7:              Today the patient is found awake alert and interactive appropriately sitting in the bedside chair.  Complains only of feeling tired and weak.  No acute events overnight.  Her Coreg was held yesterday due to bradycardia.  Continues today with heart rate ranging from 54-79.  Asymptomatic with this.  Blood pressure stable.  Otherwise denies interval new symptoms or complaints including chest pain palpitations pleuritic type pain unusual shortness of breath cough sputum nausea abdominal pain flank pain fever or chills.     4/8:              Today the patient remains awake alert and interactive appropriately in bed.  No acute events overnight.  Voices no specific complaints.  Heart rate has remained mildly bradycardic and asymptomatic.  Coreg continues to be held.  Not requiring a sitter.  At this time awaiting authorization for SNF. Otherwise denies interval new symptoms or complaints including chest pain palpitations pleuritic type pain unusual shortness of breath cough sputum nausea abdominal pain flank pain fever or chills     4/9:               Today the patient once again is awake alert and interactive appropriately in bed.  Specific complaints and no acute events overnight.  Heart rate appears improved in the normal range today.  Blood pressures trending up and may need to resume Coreg.  Continuing to await authorization for SNF.  Otherwise denies interval new symptoms or complaints including chest pain palpitations pleuritic type pain unusual shortness of breath cough sputum nausea abdominal pain flank pain fever or chills     4/10:               Today the patient is found sleeping but does awaken to name and exam.  Voices no specific complaints and no acute events overnight.  Continues with good heart rate and blood pressure levels.  Notified of insurance denial for SNF and awaiting appeal which likely will be on the weekend.  Otherwise denies interval new symptoms or complaints including chest pain palpitations pleuritic type pain unusual shortness of breath cough sputum nausea abdominal pain flank pain fever or chills     4/11:               Today patient is seen in the bedside chair more awake alert and interactive appropriately.  Voices no specific complaints and no acute complaints.  Today continues with mild asymptomatic bradycardia.  Continues to do well on room air.  Blood pressure normal and stable.  Continuing at this point to await appeal for SNF. Otherwise denies interval new symptoms or complaints including chest pain palpitations pleuritic type pain unusual shortness of breath cough sputum nausea abdominal pain flank pain fever or chills     4/12:               Today patient seen in bed and remains awake alert and interactive appropriately.  Voices no specific complaints and no acute events overnight.  Continues on room air.  Vital signs otherwise normal and stable. Continuing at this point to await appeal for SNF. Otherwise denies interval new symptoms or complaints including chest pain palpitations pleuritic type pain unusual shortness of breath cough sputum nausea abdominal pain flank pain fever or chills     4/13:                Today patient seen once again in bed.  Sleeping but awakens easily to name and exam.  Interactive at her baseline otherwise.  Voices no specific complaints and no acute events  "overnight.  Continuing to await appeal for SNF.  Otherwise denies interval new symptoms or complaints including chest pain palpitations pleuritic type pain unusual shortness of breath cough sputum nausea abdominal pain flank pain fever or chills        OBJECTIVE:         Physical Exam  /62 (BP Location: Right arm, Patient Position: Lying)   Pulse 59   Temp 35.9 °C (96.7 °F) (Temporal)   Resp 18   Ht 1.651 m (5' 5\")   Wt 74.8 kg (164 lb 14.5 oz)   SpO2 97%   BMI 27.44 kg/m²   Body mass index is 27.44 kg/m².     GEN - NAD, slender elderly  female awake alert and interactive appropriately  PULM - CTA  CVS - RRR, normal rate, 2/6 murmur  ABD - soft and nontender  EXTR - trace bilat LE edema  Psych: Pleasant and cooperative to exam     Scheduled medications     Scheduled Medications   aspirin, 81 mg, oral, Daily  atorvastatin, 20 mg, oral, Nightly  [Held by provider] carvedilol, 3.125 mg, oral, BID  enoxaparin, 30 mg, subcutaneous, q24h  lisinopril, 10 mg, oral, Daily  pantoprazole, 40 mg, oral, Daily before breakfast   Or  pantoprazole, 40 mg, intravenous, Daily before breakfast         Continuous medications  Continuous Medications          PRN medications  PRN Medications   PRN medications: acetaminophen, guaiFENesin, LORazepam, melatonin, ondansetron, polyethylene glycol         Labs          Results from last 7 days   Lab Units 04/08/25  0425   WBC AUTO x10*3/uL 6.0   HEMOGLOBIN g/dL 9.6*   HEMATOCRIT % 30.8*   PLATELETS AUTO x10*3/uL 260                Results from last 7 days   Lab Units 04/09/25  0449 04/08/25  0425   SODIUM mmol/L 137 138   POTASSIUM mmol/L 4.3 4.0   CHLORIDE mmol/L 106 107   CO2 mmol/L 25 25   BUN mg/dL 24* 22   CREATININE mg/dL 1.19* 1.29*   CALCIUM mg/dL 9.7 8.9   GLUCOSE mg/dL 80 88            Microbiology:   No results found for the last 90 days.                     No lab exists for component: \"AGALPCRNB\"   .ID              Lab Results   Component Value Date     " URINECULTURE   04/05/2025       Growth indicates contamination with periurethral roly. Repeat culture if clinically indicated.            ASSESSMENT & PLAN:      1)  Fall/Debility  -  no fractures or acute injuries found during workup  - PT eval pending and will likely need SNF.  [facility] even if not qualifying for [facility] it sounds like she will need placement due to presumed dementia and unsafe living independently  - sounds like family has 24/7 caregivers and has cameras in the home for safety  - no other acute metabolic or infectious etiologies found  4/7: AM-PAC 15.  Working towards SNF.  4/13: Initially denied by insurance.  Transitional care pursuing appeal.     2)  Possible UTI  -  empiric Keflex started but culture suggests contamination >> will stop abx     3) CAD/HTN/bradycardia  -  resumed home meds  - has been bradycardic and will hold coreg  4/7: Continues today with mild asymptomatic bradycardia.    4/9: Heart rates primarily good range and normal today.  Blood pressure perhaps starting to trend up and may need to resume Coreg.  Continue to monitor.                   Disposition/additional care plan/interventions:4/18/2025     Patient appeared clinically well.     Blood pressure Controlled.........................Continue hydralazine and lisinopril.     Heart rate at 55 but patient asymptomatic.     Monitor off Coreg     Continue current antihypertensive therapy          Discharge barrier remains: Awaiting appeal for ECF.     Discharge planning: Discharge barrier awaiting appeal determination for skilled nursing facility.                       Dictation performed with assistance of voice recognition device therefore transcription errors are possible.

## 2025-04-18 NOTE — PROGRESS NOTES
Physical Therapy                 Therapy Communication Note    Patient Name: Sylvia Moody  MRN: [id number]  Department: [facility] 2 E OBS  Room: [facility]/2311-B  Today's Date: 4/18/2025     Discipline: Physical Therapy    PT Missed Visit: Yes     Missed Visit Reason: Missed Visit Reason: Patient refused    Missed Time: Attempt    Comment: Patient's blood pressure is low today 90/55 mmHg, attempted therapy with patient two times but patient declined both times.     Gianna D'Amico, S-PTA

## 2025-04-18 NOTE — CARE PLAN
The patient's goals for the shift include      The clinical goals for the shift include to remain free from falls    Problem: Pain - Adult  Goal: Verbalizes/displays adequate comfort level or baseline comfort level  Outcome: Progressing     Problem: Safety - Adult  Goal: Free from fall injury  Outcome: Progressing     Problem: Discharge Planning  Goal: Discharge to home or other facility with appropriate resources  Outcome: Progressing     Problem: Chronic Conditions and Co-morbidities  Goal: Patient's chronic conditions and co-morbidity symptoms are monitored and maintained or improved  Outcome: Progressing     Problem: Nutrition  Goal: Nutrient intake appropriate for maintaining nutritional needs  Outcome: Progressing     Problem: Skin  Goal: Decreased wound size/increased tissue granulation at next dressing change  Outcome: Progressing  Goal: Participates in plan/prevention/treatment measures  Outcome: Progressing  Goal: Prevent/manage excess moisture  Outcome: Progressing  Goal: Prevent/minimize sheer/friction injuries  Outcome: Progressing  Flowsheets (Taken 4/17/2025 3864)  Prevent/minimize sheer/friction injuries:   HOB 30 degrees or less   Increase activity/out of bed for meals  Goal: Promote/optimize nutrition  Outcome: Progressing  Goal: Promote skin healing  Outcome: Progressing     Problem: Fall/Injury  Goal: Not fall by end of shift  Outcome: Progressing  Goal: Be free from injury by end of the shift  Outcome: Progressing  Goal: Verbalize understanding of personal risk factors for fall in the hospital  Outcome: Progressing  Goal: Verbalize understanding of risk factor reduction measures to prevent injury from fall in the home  Outcome: Progressing  Goal: Use assistive devices by end of the shift  Outcome: Progressing  Goal: Pace activities to prevent fatigue by end of the shift  Outcome: Progressing

## 2025-04-18 NOTE — PROGRESS NOTES
Occupational Therapy    OT Treatment    Patient Name: Sylvia Moody  MRN: [id number]  Department: [facility] 2 E OBS  Room: 2311/2311-B  Today's Date: 4/18/2025  Time Calculation  Start Time: 1125  Stop Time: 1149  Time Calculation (min): 24 min        Assessment:  OT Assessment: Patient making progress goals. Consulted with OT regarding goals and POC.  End of Session Patient Position: Up in chair, Alarm on     Plan:  Treatment Interventions: ADL retraining, Functional transfer training, UE strengthening/ROM, Endurance training, Patient/family training, Cognitive reorientation, Neuromuscular reeducation, Equipment evaluation/education  OT Frequency: 3 times per week  OT Discharge Recommendations: Moderate intensity level of continued care  Equipment Recommended upon Discharge: Other (comment) (TBD)  OT - OK to Discharge: Yes  Treatment Interventions: ADL retraining, Functional transfer training, UE strengthening/ROM, Endurance training, Patient/family training, Cognitive reorientation, Neuromuscular reeducation, Equipment evaluation/education    Subjective   Previous Visit Info:  OT Last Visit  OT Received On: 04/18/25  General:  General  Prior to Session Communication: Bedside nurse  Patient Position Received: Up in chair, Alarm on  Precautions:  Medical Precautions: Fall precautions          Pain:  Pain Assessment  0-10 (Numeric) Pain Score: 0 - No pain    Objective    Cognition:  Cognition  Orientation Level: Disoriented to situation, Disoriented to time     Activities of Daily Living: Grooming  Grooming Level of Assistance: Close supervision  Grooming Where Assessed: Standing sinkside    LE Dressing  LE Dressing: Yes  Sock Level of Assistance: Minimum assistance (while seated on chair. don/doff socks)  Adult Briefs Level of Assistance: Minimum assistance (while seated on toilet)    Toileting  Toileting Level of Assistance: Minimum assistance  Where Assessed: Toilet     Bed Mobility/Transfers:    Toilet  Transfers  Toilet Transfer From: Chair  Toilet Transfer Type: To and from  Toilet Transfer to: Standard toilet  Toilet Transfer Technique: Ambulating  Toilet Transfers: Verbal cues, Supervision (Close Supervision, wheeled walker)     Therapy/Activity: Therapeutic Exercise  Therapeutic Exercise Performed: Yes  Therapeutic Exercise Activity 1: Patient completed BUE exercises without resistance x5 reps x2 sets shoulder flexion, press/punch forward, elbow flex, horizontal abd/add.    Outcome Measures:[facility] Daily Activity  Putting on and taking off regular lower body clothing: A little  Bathing (including washing, rinsing, drying): A lot  Putting on and taking off regular upper body clothing: A little  Toileting, which includes using toilet, bedpan or urinal: A lot  Taking care of personal grooming such as brushing teeth: None  Eating Meals: None  Daily Activity - Total Score: 18    Education Documentation  ADL Training, taught by LYLE Lima at 4/18/2025  1:58 PM.  Learner: Patient  Readiness: Acceptance  Method: Explanation  Response: Needs Reinforcement    Education Comments  No comments found.           Goals:  Encounter Problems       Encounter Problems (Active)       ADLs       Patient with complete lower body dressing with supervision level of assistance donning and doffing all LE clothes  with PRN adaptive equipment  (Progressing)       Start:  04/05/25    Expected End:  05/02/25            Patient will complete daily grooming tasks brushing teeth and washing face/hair with set-up level of assistance and PRN adaptive equipment . (Progressing)       Start:  04/05/25    Expected End:  05/02/25            Patient will complete toileting including hygiene clothing management/hygiene with stand by assist level of assistance. (Progressing)       Start:  04/05/25    Expected End:  05/02/25               BALANCE       Pt will maintain dynamic standing balance during ADL task with stand by assist level of  assistance in order to demonstrate decreased risk of falling and improved postural control. (Progressing)       Start:  04/18/25    Expected End:  05/02/25               EXERCISE/STRENGTHENING       Patient will complete BUE exercises for 5-7 reps in order to improve strength and activity for ADL performance.  (Progressing)       Start:  04/18/25    Expected End:  05/02/25               MOBILITY       Patient will perform Functional mobility Household distances/Community Distances with modified independent level of assistance and least restrictive device in order to improve safety and functional mobility. (Progressing)       Start:  04/18/25    Expected End:  05/02/25

## 2025-04-19 LAB
APPEARANCE UR: CLEAR
BILIRUB UR STRIP.AUTO-MCNC: NEGATIVE MG/DL
COLOR UR: YELLOW
GLUCOSE UR STRIP.AUTO-MCNC: NORMAL MG/DL
HYALINE CASTS #/AREA URNS AUTO: ABNORMAL /LPF
KETONES UR STRIP.AUTO-MCNC: NEGATIVE MG/DL
LEUKOCYTE ESTERASE UR QL STRIP.AUTO: ABNORMAL
MUCOUS THREADS #/AREA URNS AUTO: ABNORMAL /LPF
NITRITE UR QL STRIP.AUTO: NEGATIVE
PH UR STRIP.AUTO: 6 [PH]
PROT UR STRIP.AUTO-MCNC: NEGATIVE MG/DL
RBC # UR STRIP.AUTO: NEGATIVE MG/DL
RBC #/AREA URNS AUTO: ABNORMAL /HPF
SP GR UR STRIP.AUTO: 1.02
SQUAMOUS #/AREA URNS AUTO: ABNORMAL /HPF
UROBILINOGEN UR STRIP.AUTO-MCNC: NORMAL MG/DL
WBC #/AREA URNS AUTO: ABNORMAL /HPF

## 2025-04-19 PROCEDURE — 87086 URINE CULTURE/COLONY COUNT: CPT | Mod: PORLAB | Performed by: HOSPITALIST

## 2025-04-19 PROCEDURE — G0378 HOSPITAL OBSERVATION PER HR: HCPCS

## 2025-04-19 PROCEDURE — 81001 URINALYSIS AUTO W/SCOPE: CPT | Performed by: HOSPITALIST

## 2025-04-19 PROCEDURE — 2500000001 HC RX 250 WO HCPCS SELF ADMINISTERED DRUGS (ALT 637 FOR MEDICARE OP)

## 2025-04-19 PROCEDURE — 2500000001 HC RX 250 WO HCPCS SELF ADMINISTERED DRUGS (ALT 637 FOR MEDICARE OP): Performed by: INTERNAL MEDICINE

## 2025-04-19 PROCEDURE — 2500000001 HC RX 250 WO HCPCS SELF ADMINISTERED DRUGS (ALT 637 FOR MEDICARE OP): Performed by: EMERGENCY MEDICINE

## 2025-04-19 PROCEDURE — 2500000001 HC RX 250 WO HCPCS SELF ADMINISTERED DRUGS (ALT 637 FOR MEDICARE OP): Performed by: HOSPITALIST

## 2025-04-19 PROCEDURE — 96372 THER/PROPH/DIAG INJ SC/IM: CPT

## 2025-04-19 PROCEDURE — 2500000004 HC RX 250 GENERAL PHARMACY W/ HCPCS (ALT 636 FOR OP/ED): Mod: JZ

## 2025-04-19 PROCEDURE — 99232 SBSQ HOSP IP/OBS MODERATE 35: CPT | Performed by: HOSPITALIST

## 2025-04-19 RX ADMIN — ATORVASTATIN CALCIUM 20 MG: 20 TABLET, FILM COATED ORAL at 21:28

## 2025-04-19 RX ADMIN — DOCUSATE SODIUM 100 MG: 100 CAPSULE, LIQUID FILLED ORAL at 21:28

## 2025-04-19 RX ADMIN — ENOXAPARIN SODIUM 30 MG: 30 INJECTION SUBCUTANEOUS at 15:06

## 2025-04-19 ASSESSMENT — COGNITIVE AND FUNCTIONAL STATUS - GENERAL
MOVING TO AND FROM BED TO CHAIR: A LITTLE
EATING MEALS: A LITTLE
TOILETING: A LITTLE
DRESSING REGULAR UPPER BODY CLOTHING: A LITTLE
MOBILITY SCORE: 19
MOVING TO AND FROM BED TO CHAIR: A LITTLE
DAILY ACTIVITIY SCORE: 18
STANDING UP FROM CHAIR USING ARMS: A LITTLE
TOILETING: A LITTLE
PERSONAL GROOMING: A LITTLE
EATING MEALS: A LITTLE
MOBILITY SCORE: 19
CLIMB 3 TO 5 STEPS WITH RAILING: A LOT
DAILY ACTIVITIY SCORE: 18
DRESSING REGULAR UPPER BODY CLOTHING: A LITTLE
CLIMB 3 TO 5 STEPS WITH RAILING: A LOT
WALKING IN HOSPITAL ROOM: A LITTLE
HELP NEEDED FOR BATHING: A LITTLE
DRESSING REGULAR LOWER BODY CLOTHING: A LITTLE
DRESSING REGULAR LOWER BODY CLOTHING: A LITTLE
STANDING UP FROM CHAIR USING ARMS: A LITTLE
WALKING IN HOSPITAL ROOM: A LITTLE
HELP NEEDED FOR BATHING: A LITTLE
PERSONAL GROOMING: A LITTLE

## 2025-04-19 ASSESSMENT — PAIN - FUNCTIONAL ASSESSMENT
PAIN_FUNCTIONAL_ASSESSMENT: 0-10
PAIN_FUNCTIONAL_ASSESSMENT: 0-10

## 2025-04-19 ASSESSMENT — PAIN SCALES - GENERAL
PAINLEVEL_OUTOF10: 0 - NO PAIN
PAINLEVEL_OUTOF10: 0 - NO PAIN

## 2025-04-19 NOTE — CARE PLAN
Problem: Skin  Goal: Prevent/minimize sheer/friction injuries  Outcome: Progressing  Flowsheets (Taken 4/18/2025 2014)  Prevent/minimize sheer/friction injuries: Use pull sheet

## 2025-04-19 NOTE — PROGRESS NOTES
Per communication with the [facility] this morning, they have no update on auth. Discussed with A [place] with the  Auth Team. [employer] is closed on the weekends so will not have an update until Monday.

## 2025-04-19 NOTE — PROGRESS NOTES
"Physical Therapy                 Therapy Communication Note    Patient Name: Sylvia Moody  MRN: [id number]  Department: [facility] 2 E OBS  Room: [facility]/2311-B  Today's Date: 4/19/2025     Discipline: Physical Therapy    PT Missed Visit: Yes     Missed Visit Reason: Missed Visit Reason: Patient refused (pt. has been sleeping alot today, up with nursing staff to bathroom and to chair. pt. sleeping in chair upon arrival, attempted x's 3 , pt. briefly awoke, did not open eyes, and sates \"LEAVE ME ALONE!\", quickly fell back asleep.)    Missed Time: Attempt    Comment:  "

## 2025-04-19 NOTE — PROGRESS NOTES
Sylvia Johnsno [id number]   Service: Internal Medicine / Hospitalist Date of service: 4/19/2025                                  DNR and No Intubation                        Subjective       History Of Present Illness (HPI):  Sylvia Moody is a 95 y.o. female with PMHx s/f CAD s/p prior CABG, HTN, DLD presenting with unwitnessed fall. She is a poor historian and doesn't remember what happened, most history obtained by chart review.  Per ambulance record, EMS was requested for patient with a fall and had a leg injury.  On EMS arrival family stated that she had fallen and was able to get her up into a chair.  Her family member thought she may have a broken hip as her leg appeared outwardly rotated.  She is acting normal for her per family and is usually ANO x 2 at baseline.  She is unable to recount how she fell or how long she was down or if she hit her head.  Denies headache, neck pain, chest pain, SOB, dysuria, focal or lateralizing weakness.     ED Course:   Vitals on presentation: T 36.4 °C (97.5 °F)  HR 52  BP (!) 188/84  RR 18  O2 99 % None (Room air)  Labs:   CMP, CBC largely unremarkable  Lactate 0.6  Troponin 66 BNP 42  UA-leukocyte esterase 500, WBC 21-50  EKG: Sinus rhythm at 77 bpm, RBBB and LAFB.  , QTc 502.  Imaging - agree with radiology interpretation(s):   XR tib-fib left, pelvis-no acute osseous abnormality denies generative changes  CT head-, CT cervical spine-no acute intracranial abnormality.  Encephalomalacia in the right hemisphere similar compared to prior imaging with remote infarct.  Nonspecific scattered white matter hypodensities favoring sequela small vessel ischemia.  Cervical spondylosis.  XR chest-no acute abnormality  XR shoulder left-degenerative changes and osteopenia  Interventions: Tylenol 650 mg, aspirin 81 mg,  ml, started on Keflex 500 mg every 12 hours, admission for further management.     4/7:              Today the patient is found awake alert and  interactive appropriately sitting in the bedside chair.  Complains only of feeling tired and weak.  No acute events overnight.  Her Coreg was held yesterday due to bradycardia.  Continues today with heart rate ranging from 54-79.  Asymptomatic with this.  Blood pressure stable.  Otherwise denies interval new symptoms or complaints including chest pain palpitations pleuritic type pain unusual shortness of breath cough sputum nausea abdominal pain flank pain fever or chills.     4/8:              Today the patient remains awake alert and interactive appropriately in bed.  No acute events overnight.  Voices no specific complaints.  Heart rate has remained mildly bradycardic and asymptomatic.  Coreg continues to be held.  Not requiring a sitter.  At this time awaiting authorization for SNF. Otherwise denies interval new symptoms or complaints including chest pain palpitations pleuritic type pain unusual shortness of breath cough sputum nausea abdominal pain flank pain fever or chills     4/9:               Today the patient once again is awake alert and interactive appropriately in bed.  Specific complaints and no acute events overnight.  Heart rate appears improved in the normal range today.  Blood pressures trending up and may need to resume Coreg.  Continuing to await authorization for SNF. Otherwise denies interval new symptoms or complaints including chest pain palpitations pleuritic type pain unusual shortness of breath cough sputum nausea abdominal pain flank pain fever or chills     4/10:               Today the patient is found sleeping but does awaken to name and exam.  Voices no specific complaints and no acute events overnight.  Continues with good heart rate and blood pressure levels.  Notified of insurance denial for SNF and awaiting appeal which likely will be on the weekend.  Otherwise denies interval new symptoms or complaints including chest pain palpitations pleuritic type pain unusual shortness of  breath cough sputum nausea abdominal pain flank pain fever or chills     4/11:               Today patient is seen in the bedside chair more awake alert and interactive appropriately.  Voices no specific complaints and no acute complaints.  Today continues with mild asymptomatic bradycardia.  Continues to do well on room air.  Blood pressure normal and stable.  Continuing at this point to await appeal for SNF. Otherwise denies interval new symptoms or complaints including chest pain palpitations pleuritic type pain unusual shortness of breath cough sputum nausea abdominal pain flank pain fever or chills     4/12:               Today patient seen in bed and remains awake alert and interactive appropriately.  Voices no specific complaints and no acute events overnight.  Continues on room air.  Vital signs otherwise normal and stable. Continuing at this point to await appeal for SNF. Otherwise denies interval new symptoms or complaints including chest pain palpitations pleuritic type pain unusual shortness of breath cough sputum nausea abdominal pain flank pain fever or chills     4/13:                Today patient seen once again in bed.  Sleeping but awakens easily to name and exam.  Interactive at her baseline otherwise.  Voices no specific complaints and no acute events overnight.  Continuing to await appeal for SNF.  Otherwise denies interval new symptoms or complaints including chest pain palpitations pleuritic type pain unusual shortness of breath cough sputum nausea abdominal pain flank pain fever or chills     4/14................  Patient up in chair, disclose no acute complaints mentioned that overall she felt better today.No reported :fevers, chills, headaches, chest pains, nausea or vomiting.     4/15...................Patient seen examined in the  presence of her nurse..No reported headaches, chest pains, dyspnea, nausea, vomiting, fevers or chills.     4/16..........Patient report doing well today voiced  appreciation of her care.  No reported: Headaches, chest pains, nausea, vomiting, fevers, chills or dysuria.     4/17.................The patient had no complaints at the time of evaluation.No reported: Dyspnea, chest pains, nausea, vomiting, fevers or chills.     4/18...............Late note entry patient seen and evaluated earlier this morning.  No provisional report of overnight events by nursing.  No acute complaints voiced at the time of evaluation.No reported: Headaches, chest pains, dyspnea, palpitations, nausea, vomiting, fevers or chills.    4/19...............................Patient seen and examined in the presence of her nurse.  Patient seems a bit agitated today.  No reported: Hallucinations, tremors, seizure activities, nausea, vomiting or respiratory distress        Review of Systems:   Review of system otherwise negative if not aforementioned above in subjective.     Objective        Physical Exam      Constitutional:       Appearance: Patient appeared in no acute cardiopulmonary distress.     Comments: Patient alert and oriented to person place  and situation  to some degree it appeared.Known chronic cognitive impairmentPatient appeared nontoxic.However patient appeared more agitated today.  HEENT:      Head: Normocephalic and atraumatic.Trachea midline      Nose:No observed congestion or rhinorrhea.     Mouth/Throat: Mucous membranes Moist, Trachea appeared  midline.  Eyes:      Extraocular Movements: Extraocular movements intact.      Pupils: Pupils are equal, round, and reactive to light.      Comments: No scleral icterus or conjunctival injection appreciated.   Cardiovascular:      Rate and Rhythm: Normal rate and regular rhythm. No clicks rubs or gallops, normal S1 and S2.No peripheral stigmata of endocarditis appreciated.  Comment: Murmur 2 out of 6     Pulmonary:      Lungs appeared clear to auscultation, no adventitious sound appreciated.  Abdominal:      General: Abdomen soft, nontender,  active bowel sounds, no involuntary guarding or rebound tenderness appreciated.     Comments: None   Musculoskeletal:       No pitting edema or cyanosis appreciated.No acute joint deformity appreciated to per hypertrophy of the range of motion for upper and lower extremities.       Skin:     General: Skin is warm.      Coloration:  No jaundice     Findings: No abnormal appearing skin rashes or lesions that appeared acute noted on unclothed area of the skin..   Neurological:      General: No  new appearing focal sensory or motor deficits appreciated, no meningeal signs or dysmetria noted.   Cranial Nerves: Cranial nerves II to XII appearing grossly intact.  Comment: Patient with what appears to be chronic hearing difficulties however still appeared to have difficulties following a stream of thought with known chronic cognitive impairment.     Genitals:  Deferred  Psychiatric:         The patient appears to be displaying normal mood and affect at the time of evaluation.     Labs:        Lab Results   Component Value Date    GLUCOSE 76 04/16/2025    CALCIUM 10.3 04/16/2025     04/16/2025    K 4.5 04/16/2025    CO2 23 04/16/2025     04/16/2025    BUN 22 04/16/2025    CREATININE 1.23 (H) 04/16/2025      Lab Results   Component Value Date    WBC 6.0 04/08/2025    HGB 9.6 (L) 04/08/2025    HCT 30.8 (L) 04/08/2025    MCV 98 04/08/2025     04/08/2025       [unfilled]   [unfilled]   No results found for the last 90 days.                  X-rays/ Images     [unfilled]   Radiology Results (last 21 days)     Procedure Component Value Units Date/Time   XR shoulder left 2+ views [[id number]] Collected: 04/04/25 2329   Order Status: Completed Updated: 04/05/25 0005   Narrative:     STUDY:  Shoulder Radiographs; 04/04/2025 11:15  INDICATION:  Left shoulder pain.  COMPARISON:  None available.  ACCESSION NUMBER(S):  [id number]  ORDERING CLINICIAN:  LIZBETH HAYES  TECHNIQUE:  Two view(s) of the left  shoulder.  FINDINGS:    There is no displaced fracture.  There is osteopenia with advanced  degenerative changes of the glenohumeral joint and acromial clavicular  joint.  There is calcific tendinopathy supraspinatus.  There is  osteopenia.  Clavicle is intact.  There is soft tissue swelling.   Impression:     Advanced degenerative changes and osteopenia with calcific  tendinopathy supraspinatus tendon.  Signed by Tani Abraham DO   CT cervical spine wo IV contrast [[id number]] Collected: 04/04/25 2234   Order Status: Completed Updated: 04/04/25 2234   Narrative:     Interpreted By:  Finkelstein, Evan,  STUDY:  CT HEAD WO IV CONTRAST; CT CERVICAL SPINE WO IV CONTRAST;  4/4/2025  9:49 pm      INDICATION:  Signs/Symptoms:unwitnessed fall; Signs/Symptoms:unwithnessed fall.          COMPARISON:  CT brain 12/15/2021      ACCESSION NUMBER(S):  [id number]; [id number]      ORDERING CLINICIAN:  DOT HUSTON      TECHNIQUE:  Noncontrast CT images of the head with coronal and sagittal  reconstructions. Axial noncontrast CT images of the cervical spine  with coronal and sagittal reconstructed images.      FINDINGS:  EXTRACRANIAL SOFT TISSUES: Unremarkable.      CALVARIUM: No depressed skull fracture. No destructive osseous lesion.      PARANASAL SINUSES/MASTOIDS: The visualized paranasal sinuses and  mastoid air cells are aerated.      HEMORRHAGE: No acute intracranial hemorrhage.      BRAIN PARENCHYMA: Gray-white matter interfaces are preserved. No mass  effect or midline shift. Encephalomalacia in the right hemisphere  similar compared to prior imaging compatible with remote infarct.  Additional nonspecific scattered white matter hypodensities are again  seen.      VENTRICLES and EXTRA-AXIAL SPACES: Parenchymal atrophy with  prominence of the ventricles and cortical sulci.      OTHER FINDINGS: There are calcifications within the cavernous carotids      CERVICAL SPINE:      ALIGNMENT: Straightening of the  normal cervical lordosis, which may  be positional or related to spasm. Grade 1 anterolisthesis C4 on C5  and C5 on C6. VERTEBRAE: No acute loss of vertebral body height.  Bones are demineralized. DISC SPACE: Disc space narrowing C5/C6 and  C6/C7. SPINAL CANAL: Multilevel facet and uncovertebral arthropathy  with varying degrees of neural foraminal stenosis. Posterior disc  osteophyte complexes are most pronounced at C5/C6 and C6/C7 and  contribute to moderate central narrowing. PREVERTEBRAL SOFT TISSUES:  No prevertebral soft tissue swelling. LUNG APICES: Imaged portion of  the lung apices are within normal limits.      OTHER FINDINGS: None.       Impression:         No acute intracranial hemorrhage, mass effect or midline shift.      Encephalomalacia in the right hemisphere similar compared to prior  imaging is compatible with remote infarct. Additional nonspecific  scattered white matter hypodensities favored to represent sequela of  small vessel ischemia. Cervical spondylosis without acute loss of  vertebral body height or traumatic malalignment.          MACRO:  None.      Signed by: Evan Finkelstein 4/4/2025 10:33 PM  Dictation workstation:   [id number]   CT head wo IV contrast [[id number]] Collected: 04/04/25 2234   Order Status: Completed Updated: 04/04/25 2234   Narrative:     Interpreted By:  Finkelstein, Evan,  STUDY:  CT HEAD WO IV CONTRAST; CT CERVICAL SPINE WO IV CONTRAST;  4/4/2025  9:49 pm      INDICATION:  Signs/Symptoms:unwitnessed fall; Signs/Symptoms:unwithnessed fall.          COMPARISON:  CT brain 12/15/2021      ACCESSION NUMBER(S):  [id number]; [id number]      ORDERING CLINICIAN:  DOT HUSTON      TECHNIQUE:  Noncontrast CT images of the head with coronal and sagittal  reconstructions. Axial noncontrast CT images of the cervical spine  with coronal and sagittal reconstructed images.      FINDINGS:  EXTRACRANIAL SOFT TISSUES: Unremarkable.      CALVARIUM: No depressed skull  fracture. No destructive osseous lesion.      PARANASAL SINUSES/MASTOIDS: The visualized paranasal sinuses and  mastoid air cells are aerated.      HEMORRHAGE: No acute intracranial hemorrhage.      BRAIN PARENCHYMA: Gray-white matter interfaces are preserved. No mass  effect or midline shift. Encephalomalacia in the right hemisphere  similar compared to prior imaging compatible with remote infarct.  Additional nonspecific scattered white matter hypodensities are again  seen.      VENTRICLES and EXTRA-AXIAL SPACES: Parenchymal atrophy with  prominence of the ventricles and cortical sulci.      OTHER FINDINGS: There are calcifications within the cavernous carotids      CERVICAL SPINE:      ALIGNMENT: Straightening of the normal cervical lordosis, which may  be positional or related to spasm. Grade 1 anterolisthesis C4 on C5  and C5 on C6. VERTEBRAE: No acute loss of vertebral body height.  Bones are demineralized. DISC SPACE: Disc space narrowing C5/C6 and  C6/C7. SPINAL CANAL: Multilevel facet and uncovertebral arthropathy  with varying degrees of neural foraminal stenosis. Posterior disc  osteophyte complexes are most pronounced at C5/C6 and C6/C7 and  contribute to moderate central narrowing. PREVERTEBRAL SOFT TISSUES:  No prevertebral soft tissue swelling. LUNG APICES: Imaged portion of  the lung apices are within normal limits.      OTHER FINDINGS: None.       Impression:         No acute intracranial hemorrhage, mass effect or midline shift.      Encephalomalacia in the right hemisphere similar compared to prior  imaging is compatible with remote infarct. Additional nonspecific  scattered white matter hypodensities favored to represent sequela of  small vessel ischemia. Cervical spondylosis without acute loss of  vertebral body height or traumatic malalignment.          MACRO:  None.      Signed by: Evan Finkelstein 4/4/2025 10:33 PM  Dictation workstation:   [id number]   XR chest 2 views [[id number]]  Collected: 04/04/25 2308   Order Status: Completed Updated: 04/04/25 2315   Narrative:     STUDY:  Chest Radiographs;  4/4/2025 9:37PM  INDICATION:  Fall.  COMPARISON:  XR Chest: 2/13/2023  ACCESSION NUMBER(S):  [id number]  ORDERING CLINICIAN:  DOT HUSTON  TECHNIQUE:  Frontal and lateral chest.  FINDINGS:  CARDIOMEDIASTINAL SILHOUETTE:  Cardiomediastinal silhouette is mildly prominent and unchanged..     LUNGS:  Lungs are clear.     ABDOMEN:  No remarkable upper abdominal findings.     BONES:  No acute osseous changes.  Advanced degenerative changes are  demonstrated of both shoulder joints.   Impression:     No acute abnormality.  Signed by Rush Martin DO   XR tibia fibula left 2 views [[id number]] Collected: 04/04/25 2158   Order Status: Completed Updated: 04/04/25 2204   Narrative:     STUDY:  Tibia and Fibula Radiographs; 04/004/2025 9:36 PM  INDICATION:  Fall.  COMPARISON:  None.  ACCESSION NUMBER(S):  [id number]  ORDERING CLINICIAN:  DOT HUSTON  TECHNIQUE:  Two view(s) of the left tibia and fibula.  FINDINGS:    There is no displaced fracture.  The alignment is anatomic.  No soft  tissue abnormality is seen. There are degenerative changes of the knee  and ankle joint   Impression:     No acute osseous abnormality.  Degenerative changes.  Signed by Davin Doyle MD   XR pelvis 1-2 views [[id number]] Collected: 04/04/25 2159   Order Status: Completed Updated: 04/04/25 2205   Narrative:     STUDY:  Pelvis Radiographs; 04/04/2025 9:36 PM  INDICATION:  Fall.  COMPARISON:  XR right hip pelvis 12/15/2021.  ACCESSION NUMBER(S):  [id number]  ORDERING CLINICIAN:  DOT HUSTON  TECHNIQUE:  Single view of the pelvis.  FINDINGS:    The pelvic ring is intact.  There is no acute fracture. Bilateral  degenerative changes of the hips.   Impression:     No acute osseous abnormality.  Bilateral degenerative changes of the hips..  Signed by Davin Doyle MD                  Medical  Problems         Problem List         * (Principal) Generalized weakness     Arteriosclerotic coronary artery disease     Benign essential hypertension     Hyperlipidemia     Volvulus of colon (Multi)     Vascular insufficiency of intestine (Multi)     Sensorineural hearing loss (SNHL) of both ears                  Above medical problems may be reflective of historical medical problems that may have resolved and may not related to acute clinical condition/medical problems.     Clinical impression/plan:  Expand All Collapse All    SUBJECTIVE      Pt requiring sitter due to impulsivity  Has been having visual hallucinations     4/7:              Today the patient is found awake alert and interactive appropriately sitting in the bedside chair.  Complains only of feeling tired and weak.  No acute events overnight.  Her Coreg was held yesterday due to bradycardia.  Continues today with heart rate ranging from 54-79.  Asymptomatic with this.  Blood pressure stable.  Otherwise denies interval new symptoms or complaints including chest pain palpitations pleuritic type pain unusual shortness of breath cough sputum nausea abdominal pain flank pain fever or chills.     4/8:              Today the patient remains awake alert and interactive appropriately in bed.  No acute events overnight.  Voices no specific complaints.  Heart rate has remained mildly bradycardic and asymptomatic.  Coreg continues to be held.  Not requiring a sitter.  At this time awaiting authorization for SNF. Otherwise denies interval new symptoms or complaints including chest pain palpitations pleuritic type pain unusual shortness of breath cough sputum nausea abdominal pain flank pain fever or chills     4/9:               Today the patient once again is awake alert and interactive appropriately in bed.  Specific complaints and no acute events overnight.  Heart rate appears improved in the normal range today.  Blood pressures trending up and may need to  resume Coreg.  Continuing to await authorization for SNF. Otherwise denies interval new symptoms or complaints including chest pain palpitations pleuritic type pain unusual shortness of breath cough sputum nausea abdominal pain flank pain fever or chills     4/10:               Today the patient is found sleeping but does awaken to name and exam.  Voices no specific complaints and no acute events overnight.  Continues with good heart rate and blood pressure levels.  Notified of insurance denial for SNF and awaiting appeal which likely will be on the weekend.  Otherwise denies interval new symptoms or complaints including chest pain palpitations pleuritic type pain unusual shortness of breath cough sputum nausea abdominal pain flank pain fever or chills     4/11:               Today patient is seen in the bedside chair more awake alert and interactive appropriately.  Voices no specific complaints and no acute complaints.  Today continues with mild asymptomatic bradycardia.  Continues to do well on room air.  Blood pressure normal and stable.  Continuing at this point to await appeal for SNF. Otherwise denies interval new symptoms or complaints including chest pain palpitations pleuritic type pain unusual shortness of breath cough sputum nausea abdominal pain flank pain fever or chills     4/12:               Today patient seen in bed and remains awake alert and interactive appropriately.  Voices no specific complaints and no acute events overnight.  Continues on room air.  Vital signs otherwise normal and stable. Continuing at this point to await appeal for SNF. Otherwise denies interval new symptoms or complaints including chest pain palpitations pleuritic type pain unusual shortness of breath cough sputum nausea abdominal pain flank pain fever or chills     4/13:                Today patient seen once again in bed.  Sleeping but awakens easily to name and exam.  Interactive at her baseline otherwise.  Voices no  "specific complaints and no acute events overnight.  Continuing to await appeal for SNF.  Otherwise denies interval new symptoms or complaints including chest pain palpitations pleuritic type pain unusual shortness of breath cough sputum nausea abdominal pain flank pain fever or chills        OBJECTIVE:         Physical Exam  /62 (BP Location: Right arm, Patient Position: Lying)   Pulse 59   Temp 35.9 °C (96.7 °F) (Temporal)   Resp 18   Ht 1.651 m (5' 5\")   Wt 74.8 kg (164 lb 14.5 oz)   SpO2 97%   BMI 27.44 kg/m²   Body mass index is 27.44 kg/m².     GEN - NAD, slender elderly  female awake alert and interactive appropriately  PULM - CTA  CVS - RRR, normal rate, 2/6 murmur  ABD - soft and nontender  EXTR - trace bilat LE edema  Psych: Pleasant and cooperative to exam     Scheduled medications     Scheduled Medications   aspirin, 81 mg, oral, Daily  atorvastatin, 20 mg, oral, Nightly  [Held by provider] carvedilol, 3.125 mg, oral, BID  enoxaparin, 30 mg, subcutaneous, q24h  lisinopril, 10 mg, oral, Daily  pantoprazole, 40 mg, oral, Daily before breakfast   Or  pantoprazole, 40 mg, intravenous, Daily before breakfast         Continuous medications  Continuous Medications          PRN medications  PRN Medications   PRN medications: acetaminophen, guaiFENesin, LORazepam, melatonin, ondansetron, polyethylene glycol         Labs          Results from last 7 days   Lab Units 04/08/25  0425   WBC AUTO x10*3/uL 6.0   HEMOGLOBIN g/dL 9.6*   HEMATOCRIT % 30.8*   PLATELETS AUTO x10*3/uL 260                Results from last 7 days   Lab Units 04/09/25  0449 04/08/25  0425   SODIUM mmol/L 137 138   POTASSIUM mmol/L 4.3 4.0   CHLORIDE mmol/L 106 107   CO2 mmol/L 25 25   BUN mg/dL 24* 22   CREATININE mg/dL 1.19* 1.29*   CALCIUM mg/dL 9.7 8.9   GLUCOSE mg/dL 80 88            Microbiology:   No results found for the last 90 days.                     No lab exists for component: \"AGALPCRNB\"   .ID              Lab " Results   Component Value Date     URINECULTURE   04/05/2025       Growth indicates contamination with periurethral roly. Repeat culture if clinically indicated.            ASSESSMENT & PLAN:      1)  Fall/Debility  -  no fractures or acute injuries found during workup  - PT eval pending and will likely need SNF.  PT AMPAC is 16 - even if not qualifying for SNF it sounds like she will need placement due to presumed dementia and unsafe living independently  - sounds like family has 24/7 caregivers and has cameras in the home for safety  - no other acute metabolic or infectious etiologies found  4/7: AM-PAC 15.  Working towards SNF.  4/13: Initially denied by insurance.  Transitional care pursuing appeal.     2)  Possible UTI  -  empiric Keflex started but culture suggests contamination >> will stop abx     3) CAD/HTN/bradycardia  -  resumed home meds  - has been bradycardic and will hold coreg  4/7: Continues today with mild asymptomatic bradycardia.    4/9: Heart rates primarily good range and normal today.  Blood pressure perhaps starting to trend up and may need to resume Coreg.  Continue to monitor.                 Disposition/additional care plan/interventions:4/18/2025     Patient appeared clinically well.     Blood pressure Controlled.........................Continue hydralazine and lisinopril.     Heart rate at 55 but patient asymptomatic.     Monitor off Coreg     Continue current antihypertensive therapy      Disposition/additional care plan/interventions:4/19/2025       Chronic cognitive impairmentWith observed behavioral disturbance but patient directable.    Check UA for completeness    A.m. labs: Basic metabolic panel and CBC    Avoid hypotension    Care plan discussed in detail with patient's nurse.    Continue supportive care  Discharge barrier remains: Awaiting appeal for ECF.     Discharge planning: Discharge barrier awaiting appeal determination for skilled nursing facility.

## 2025-04-20 VITALS
DIASTOLIC BLOOD PRESSURE: 80 MMHG | SYSTOLIC BLOOD PRESSURE: 126 MMHG | BODY MASS INDEX: 27.47 KG/M2 | TEMPERATURE: 97.7 F | OXYGEN SATURATION: 96 % | WEIGHT: 164.9 LBS | RESPIRATION RATE: 18 BRPM | HEART RATE: 62 BPM | HEIGHT: 65 IN

## 2025-04-20 LAB
ANION GAP SERPL CALC-SCNC: 8 MMOL/L (ref 10–20)
BUN SERPL-MCNC: 38 MG/DL (ref 6–23)
CALCIUM SERPL-MCNC: 9.3 MG/DL (ref 8.6–10.3)
CHLORIDE SERPL-SCNC: 106 MMOL/L (ref 98–107)
CO2 SERPL-SCNC: 23 MMOL/L (ref 21–32)
CREAT SERPL-MCNC: 1.34 MG/DL (ref 0.5–1.05)
EGFRCR SERPLBLD CKD-EPI 2021: 37 ML/MIN/1.73M*2
ERYTHROCYTE [DISTWIDTH] IN BLOOD BY AUTOMATED COUNT: 17.9 % (ref 11.5–14.5)
GLUCOSE SERPL-MCNC: 95 MG/DL (ref 74–99)
HCT VFR BLD AUTO: 30.4 % (ref 36–46)
HGB BLD-MCNC: 9.7 G/DL (ref 12–16)
HOLD SPECIMEN: NORMAL
MCH RBC QN AUTO: 30.1 PG (ref 26–34)
MCHC RBC AUTO-ENTMCNC: 31.9 G/DL (ref 32–36)
MCV RBC AUTO: 94 FL (ref 80–100)
NRBC BLD-RTO: 0 /100 WBCS (ref 0–0)
PLATELET # BLD AUTO: 312 X10*3/UL (ref 150–450)
POTASSIUM SERPL-SCNC: 4.9 MMOL/L (ref 3.5–5.3)
RBC # BLD AUTO: 3.22 X10*6/UL (ref 4–5.2)
SODIUM SERPL-SCNC: 132 MMOL/L (ref 136–145)
WBC # BLD AUTO: 6 X10*3/UL (ref 4.4–11.3)

## 2025-04-20 PROCEDURE — 2500000001 HC RX 250 WO HCPCS SELF ADMINISTERED DRUGS (ALT 637 FOR MEDICARE OP)

## 2025-04-20 PROCEDURE — 96372 THER/PROPH/DIAG INJ SC/IM: CPT

## 2025-04-20 PROCEDURE — 36415 COLL VENOUS BLD VENIPUNCTURE: CPT | Performed by: HOSPITALIST

## 2025-04-20 PROCEDURE — 99232 SBSQ HOSP IP/OBS MODERATE 35: CPT | Performed by: HOSPITALIST

## 2025-04-20 PROCEDURE — 2500000005 HC RX 250 GENERAL PHARMACY W/O HCPCS

## 2025-04-20 PROCEDURE — 2500000004 HC RX 250 GENERAL PHARMACY W/ HCPCS (ALT 636 FOR OP/ED): Mod: JZ

## 2025-04-20 PROCEDURE — 82374 ASSAY BLOOD CARBON DIOXIDE: CPT | Performed by: HOSPITALIST

## 2025-04-20 PROCEDURE — 85027 COMPLETE CBC AUTOMATED: CPT | Performed by: HOSPITALIST

## 2025-04-20 PROCEDURE — G0378 HOSPITAL OBSERVATION PER HR: HCPCS

## 2025-04-20 PROCEDURE — 2500000001 HC RX 250 WO HCPCS SELF ADMINISTERED DRUGS (ALT 637 FOR MEDICARE OP): Performed by: EMERGENCY MEDICINE

## 2025-04-20 PROCEDURE — 2500000001 HC RX 250 WO HCPCS SELF ADMINISTERED DRUGS (ALT 637 FOR MEDICARE OP): Performed by: INTERNAL MEDICINE

## 2025-04-20 PROCEDURE — 2500000001 HC RX 250 WO HCPCS SELF ADMINISTERED DRUGS (ALT 637 FOR MEDICARE OP): Performed by: HOSPITALIST

## 2025-04-20 RX ADMIN — Medication 3 MG: at 20:08

## 2025-04-20 RX ADMIN — LISINOPRIL 10 MG: 10 TABLET ORAL at 09:16

## 2025-04-20 RX ADMIN — ASPIRIN 81 MG: 81 TABLET, COATED ORAL at 09:16

## 2025-04-20 RX ADMIN — ACETAMINOPHEN 650 MG: 325 TABLET ORAL at 00:17

## 2025-04-20 RX ADMIN — PANTOPRAZOLE SODIUM 40 MG: 40 TABLET, DELAYED RELEASE ORAL at 06:11

## 2025-04-20 RX ADMIN — DOCUSATE SODIUM 100 MG: 100 CAPSULE, LIQUID FILLED ORAL at 20:09

## 2025-04-20 RX ADMIN — HYDRALAZINE HYDROCHLORIDE 10 MG: 10 TABLET ORAL at 09:16

## 2025-04-20 RX ADMIN — LORAZEPAM 0.5 MG: 0.5 TABLET ORAL at 03:25

## 2025-04-20 RX ADMIN — ENOXAPARIN SODIUM 30 MG: 30 INJECTION SUBCUTANEOUS at 16:19

## 2025-04-20 RX ADMIN — ATORVASTATIN CALCIUM 20 MG: 20 TABLET, FILM COATED ORAL at 20:08

## 2025-04-20 ASSESSMENT — PAIN - FUNCTIONAL ASSESSMENT
PAIN_FUNCTIONAL_ASSESSMENT: 0-10

## 2025-04-20 ASSESSMENT — COGNITIVE AND FUNCTIONAL STATUS - GENERAL
WALKING IN HOSPITAL ROOM: A LITTLE
TURNING FROM BACK TO SIDE WHILE IN FLAT BAD: A LITTLE
TOILETING: A LITTLE
EATING MEALS: A LITTLE
MOVING TO AND FROM BED TO CHAIR: A LITTLE
MOBILITY SCORE: 17
MOVING FROM LYING ON BACK TO SITTING ON SIDE OF FLAT BED WITH BEDRAILS: A LITTLE
HELP NEEDED FOR BATHING: A LITTLE
DAILY ACTIVITIY SCORE: 18
CLIMB 3 TO 5 STEPS WITH RAILING: A LOT
STANDING UP FROM CHAIR USING ARMS: A LITTLE
DRESSING REGULAR UPPER BODY CLOTHING: A LITTLE
PERSONAL GROOMING: A LITTLE
DRESSING REGULAR LOWER BODY CLOTHING: A LITTLE

## 2025-04-20 ASSESSMENT — PAIN DESCRIPTION - DESCRIPTORS: DESCRIPTORS: ACHING

## 2025-04-20 ASSESSMENT — PAIN SCALES - GENERAL
PAINLEVEL_OUTOF10: 0 - NO PAIN
PAINLEVEL_OUTOF10: 0 - NO PAIN
PAINLEVEL_OUTOF10: 3
PAINLEVEL_OUTOF10: 0 - NO PAIN
PAINLEVEL_OUTOF10: 0 - NO PAIN

## 2025-04-20 NOTE — PROGRESS NOTES
Sylvia Johnson [id number]   Service: Internal Medicine / Hospitalist Date of service: 4/20/2025                                  DNR and No Intubation                        Subjective       History Of Present Illness (HPI):  Sylvia Moody is a 95 y.o. female with PMHx s/f CAD s/p prior CABG, HTN, DLD presenting with unwitnessed fall. She is a poor historian and doesn't remember what happened, most history obtained by chart review.  Per ambulance record, EMS was requested for patient with a fall and had a leg injury.  On EMS arrival family stated that she had fallen and was able to get her up into a chair.  Her family member thought she may have a broken hip as her leg appeared outwardly rotated.  She is acting normal for her per family and is usually ANO x 2 at baseline.  She is unable to recount how she fell or how long she was down or if she hit her head.  Denies headache, neck pain, chest pain, SOB, dysuria, focal or lateralizing weakness.     ED Course:   Vitals on presentation: T 36.4 °C (97.5 °F)  HR 52  BP (!) 188/84  RR 18  O2 99 % None (Room air)  Labs:   CMP, CBC largely unremarkable  Lactate 0.6  Troponin 66 BNP 42  UA-leukocyte esterase 500, WBC 21-50  EKG: Sinus rhythm at 77 bpm, RBBB and LAFB.  , QTc 502.  Imaging - agree with radiology interpretation(s):   XR tib-fib left, pelvis-no acute osseous abnormality denies generative changes  CT head-, CT cervical spine-no acute intracranial abnormality.  Encephalomalacia in the right hemisphere similar compared to prior imaging with remote infarct.  Nonspecific scattered white matter hypodensities favoring sequela small vessel ischemia.  Cervical spondylosis.  XR chest-no acute abnormality  XR shoulder left-degenerative changes and osteopenia  Interventions: Tylenol 650 mg, aspirin 81 mg,  ml, started on Keflex 500 mg every 12 hours, admission for further management.     4/7:              Today the patient is found awake alert and  interactive appropriately sitting in the bedside chair.  Complains only of feeling tired and weak.  No acute events overnight.  Her Coreg was held yesterday due to bradycardia.  Continues today with heart rate ranging from 54-79.  Asymptomatic with this.  Blood pressure stable.  Otherwise denies interval new symptoms or complaints including chest pain palpitations pleuritic type pain unusual shortness of breath cough sputum nausea abdominal pain flank pain fever or chills.     4/8:              Today the patient remains awake alert and interactive appropriately in bed.  No acute events overnight.  Voices no specific complaints.  Heart rate has remained mildly bradycardic and asymptomatic.  Coreg continues to be held.  Not requiring a sitter.  At this time awaiting authorization for SNF. Otherwise denies interval new symptoms or complaints including chest pain palpitations pleuritic type pain unusual shortness of breath cough sputum nausea abdominal pain flank pain fever or chills     4/9:               Today the patient once again is awake alert and interactive appropriately in bed.  Specific complaints and no acute events overnight.  Heart rate appears improved in the normal range today.  Blood pressures trending up and may need to resume Coreg.  Continuing to await authorization for SNF. Otherwise denies interval new symptoms or complaints including chest pain palpitations pleuritic type pain unusual shortness of breath cough sputum nausea abdominal pain flank pain fever or chills     4/10:               Today the patient is found sleeping but does awaken to name and exam.  Voices no specific complaints and no acute events overnight.  Continues with good heart rate and blood pressure levels.  Notified of insurance denial for SNF and awaiting appeal which likely will be on the weekend.  Otherwise denies interval new symptoms or complaints including chest pain palpitations pleuritic type pain unusual shortness of  breath cough sputum nausea abdominal pain flank pain fever or chills     4/11:               Today patient is seen in the bedside chair more awake alert and interactive appropriately.  Voices no specific complaints and no acute complaints.  Today continues with mild asymptomatic bradycardia.  Continues to do well on room air.  Blood pressure normal and stable.  Continuing at this point to await appeal for SNF. Otherwise denies interval new symptoms or complaints including chest pain palpitations pleuritic type pain unusual shortness of breath cough sputum nausea abdominal pain flank pain fever or chills     4/12:               Today patient seen in bed and remains awake alert and interactive appropriately.  Voices no specific complaints and no acute events overnight.  Continues on room air.  Vital signs otherwise normal and stable. Continuing at this point to await appeal for SNF. Otherwise denies interval new symptoms or complaints including chest pain palpitations pleuritic type pain unusual shortness of breath cough sputum nausea abdominal pain flank pain fever or chills     4/13:                Today patient seen once again in bed.  Sleeping but awakens easily to name and exam.  Interactive at her baseline otherwise.  Voices no specific complaints and no acute events overnight.  Continuing to await appeal for SNF.  Otherwise denies interval new symptoms or complaints including chest pain palpitations pleuritic type pain unusual shortness of breath cough sputum nausea abdominal pain flank pain fever or chills     4/14................  Patient up in chair, disclose no acute complaints mentioned that overall she felt better today.No reported :fevers, chills, headaches, chest pains, nausea or vomiting.     4/15...................Patient seen examined in the  presence of her nurse..No reported headaches, chest pains, dyspnea, nausea, vomiting, fevers or chills.     4/16..........Patient report doing well today voiced  appreciation of her care.  No reported: Headaches, chest pains, nausea, vomiting, fevers, chills or dysuria.     4/17.................The patient had no complaints at the time of evaluation.No reported: Dyspnea, chest pains, nausea, vomiting, fevers or chills.     4/18...............Late note entry patient seen and evaluated earlier this morning.  No provisional report of overnight events by nursing.  No acute complaints voiced at the time of evaluation.No reported: Headaches, chest pains, dyspnea, palpitations, nausea, vomiting, fevers or chills.     4/19...............................Patient seen and examined in the presence of her nurse.  Patient seems a bit agitated today.  No reported: Hallucinations, tremors, seizure activities, nausea, vomiting or respiratory distress.    4/20......................No reported: Hallucinations, fevers, chills, syncope, nausea, vomiting, chest pains or dyspnea.        Review of Systems:   Review of system otherwise negative if not aforementioned above in subjective.     Objective        Physical Exam      Constitutional:       Appearance: Patient appeared in no acute cardiopulmonary distress.     Comments: Patient alert and oriented to person place  and situation  to some degree it appeared.Known chronic cognitive impairmentPatient appeared nontoxic.However patient appeared more agitated today.  HEENT:      Head: Normocephalic and atraumatic.Trachea midline      Nose:No observed congestion or rhinorrhea.     Mouth/Throat: Mucous membranes Moist, Trachea appeared  midline.  Eyes:      Extraocular Movements: Extraocular movements intact.      Pupils: Pupils are equal, round, and reactive to light.      Comments: No scleral icterus or conjunctival injection appreciated.   Cardiovascular:      Rate and Rhythm: Normal rate and regular rhythm. No clicks rubs or gallops, normal S1 and S2.No peripheral stigmata of endocarditis appreciated.  Comment: Murmur 2 out of 6     Pulmonary:       Lungs appeared clear to auscultation, no adventitious sound appreciated.  Abdominal:      General: Abdomen soft, nontender, active bowel sounds, no involuntary guarding or rebound tenderness appreciated.     Comments: None   Musculoskeletal:       No pitting edema or cyanosis appreciated.No acute joint deformity appreciated to per hypertrophy of the range of motion for upper and lower extremities.       Skin:     General: Skin is warm.      Coloration:  No jaundice     Findings: No abnormal appearing skin rashes or lesions that appeared acute noted on unclothed area of the skin..   Neurological:      General: No  new appearing focal sensory or motor deficits appreciated, no meningeal signs or dysmetria noted.   Cranial Nerves: Cranial nerves II to XII appearing grossly intact.  Comment: Patient with what appears to be chronic hearing difficulties however still appeared to have difficulties following a stream of thought with known chronic cognitive impairment.     Genitals:  Deferred  Psychiatric:         The patient appears to be displaying normal mood and affect at the time of evaluation.     Labs:              Lab Results   Component Value Date     GLUCOSE 76 04/16/2025     CALCIUM 10.3 04/16/2025      04/16/2025     K 4.5 04/16/2025     CO2 23 04/16/2025      04/16/2025     BUN 22 04/16/2025     CREATININE 1.23 (H) 04/16/2025       Lab Results   Component Value Date    GLUCOSE 95 04/20/2025    CALCIUM 9.3 04/20/2025     (L) 04/20/2025    K 4.9 04/20/2025    CO2 23 04/20/2025     04/20/2025    BUN 38 (H) 04/20/2025    CREATININE 1.34 (H) 04/20/2025         Lab Results   Component Value Date     WBC 6.0 04/08/2025     HGB 9.6 (L) 04/08/2025     HCT 30.8 (L) 04/08/2025     MCV 98 04/08/2025      04/08/2025       Lab Results   Component Value Date    WBC 6.0 04/20/2025    HGB 9.7 (L) 04/20/2025    HCT 30.4 (L) 04/20/2025    MCV 94 04/20/2025     04/20/2025       [unfilled]   [unfilled]   No results found for the last 90 days.                  X-rays/ Images     [unfilled]   Radiology Results (last 21 days)     Procedure Component Value Units Date/Time   XR shoulder left 2+ views [[id number]] Collected: 04/04/25 2329   Order Status: Completed Updated: 04/05/25 0005   Narrative:     STUDY:  Shoulder Radiographs; 04/04/2025 11:15  INDICATION:  Left shoulder pain.  COMPARISON:  None available.  ACCESSION NUMBER(S):  [id number]  ORDERING CLINICIAN:  LIZBETH HAYES  TECHNIQUE:  Two view(s) of the left shoulder.  FINDINGS:    There is no displaced fracture.  There is osteopenia with advanced  degenerative changes of the glenohumeral joint and acromial clavicular  joint.  There is calcific tendinopathy supraspinatus.  There is  osteopenia.  Clavicle is intact.  There is soft tissue swelling.   Impression:     Advanced degenerative changes and osteopenia with calcific  tendinopathy supraspinatus tendon.  Signed by Tani Abraham DO   CT cervical spine wo IV contrast [[id number]] Collected: 04/04/25 2234   Order Status: Completed Updated: 04/04/25 2234   Narrative:     Interpreted By:  Finkelstein, Evan,  STUDY:  CT HEAD WO IV CONTRAST; CT CERVICAL SPINE WO IV CONTRAST;  4/4/2025  9:49 pm      INDICATION:  Signs/Symptoms:unwitnessed fall; Signs/Symptoms:unwithnessed fall.          COMPARISON:  CT brain 12/15/2021      ACCESSION NUMBER(S):  [id number]; [id number]      ORDERING CLINICIAN:  DOT HUSTON      TECHNIQUE:  Noncontrast CT images of the head with coronal and sagittal  reconstructions. Axial noncontrast CT images of the cervical spine  with coronal and sagittal reconstructed images.      FINDINGS:  EXTRACRANIAL SOFT TISSUES: Unremarkable.      CALVARIUM: No depressed skull fracture. No destructive osseous lesion.      PARANASAL SINUSES/MASTOIDS: The visualized paranasal sinuses and  mastoid air cells are aerated.      HEMORRHAGE: No acute intracranial  hemorrhage.      BRAIN PARENCHYMA: Gray-white matter interfaces are preserved. No mass  effect or midline shift. Encephalomalacia in the right hemisphere  similar compared to prior imaging compatible with remote infarct.  Additional nonspecific scattered white matter hypodensities are again  seen.      VENTRICLES and EXTRA-AXIAL SPACES: Parenchymal atrophy with  prominence of the ventricles and cortical sulci.      OTHER FINDINGS: There are calcifications within the cavernous carotids      CERVICAL SPINE:      ALIGNMENT: Straightening of the normal cervical lordosis, which may  be positional or related to spasm. Grade 1 anterolisthesis C4 on C5  and C5 on C6. VERTEBRAE: No acute loss of vertebral body height.  Bones are demineralized. DISC SPACE: Disc space narrowing C5/C6 and  C6/C7. SPINAL CANAL: Multilevel facet and uncovertebral arthropathy  with varying degrees of neural foraminal stenosis. Posterior disc  osteophyte complexes are most pronounced at C5/C6 and C6/C7 and  contribute to moderate central narrowing. PREVERTEBRAL SOFT TISSUES:  No prevertebral soft tissue swelling. LUNG APICES: Imaged portion of  the lung apices are within normal limits.      OTHER FINDINGS: None.       Impression:         No acute intracranial hemorrhage, mass effect or midline shift.      Encephalomalacia in the right hemisphere similar compared to prior  imaging is compatible with remote infarct. Additional nonspecific  scattered white matter hypodensities favored to represent sequela of  small vessel ischemia. Cervical spondylosis without acute loss of  vertebral body height or traumatic malalignment.          MACRO:  None.      Signed by: Evan Finkelstein 4/4/2025 10:33 PM  Dictation workstation:   [id number]   CT head wo IV contrast [[id number]] Collected: 04/04/25 2234   Order Status: Completed Updated: 04/04/25 2234   Narrative:     Interpreted By:  Finkelstein, Evan,  STUDY:  CT HEAD WO IV CONTRAST; CT CERVICAL SPINE WO IV  CONTRAST;  4/4/2025  9:49 pm      INDICATION:  Signs/Symptoms:unwitnessed fall; Signs/Symptoms:unwithnessed fall.          COMPARISON:  CT brain 12/15/2021      ACCESSION NUMBER(S):  [id number]; [id number]      ORDERING CLINICIAN:  DOT HUSTON      TECHNIQUE:  Noncontrast CT images of the head with coronal and sagittal  reconstructions. Axial noncontrast CT images of the cervical spine  with coronal and sagittal reconstructed images.      FINDINGS:  EXTRACRANIAL SOFT TISSUES: Unremarkable.      CALVARIUM: No depressed skull fracture. No destructive osseous lesion.      PARANASAL SINUSES/MASTOIDS: The visualized paranasal sinuses and  mastoid air cells are aerated.      HEMORRHAGE: No acute intracranial hemorrhage.      BRAIN PARENCHYMA: Gray-white matter interfaces are preserved. No mass  effect or midline shift. Encephalomalacia in the right hemisphere  similar compared to prior imaging compatible with remote infarct.  Additional nonspecific scattered white matter hypodensities are again  seen.      VENTRICLES and EXTRA-AXIAL SPACES: Parenchymal atrophy with  prominence of the ventricles and cortical sulci.      OTHER FINDINGS: There are calcifications within the cavernous carotids      CERVICAL SPINE:      ALIGNMENT: Straightening of the normal cervical lordosis, which may  be positional or related to spasm. Grade 1 anterolisthesis C4 on C5  and C5 on C6. VERTEBRAE: No acute loss of vertebral body height.  Bones are demineralized. DISC SPACE: Disc space narrowing C5/C6 and  C6/C7. SPINAL CANAL: Multilevel facet and uncovertebral arthropathy  with varying degrees of neural foraminal stenosis. Posterior disc  osteophyte complexes are most pronounced at C5/C6 and C6/C7 and  contribute to moderate central narrowing. PREVERTEBRAL SOFT TISSUES:  No prevertebral soft tissue swelling. LUNG APICES: Imaged portion of  the lung apices are within normal limits.      OTHER FINDINGS: None.       Impression:          No acute intracranial hemorrhage, mass effect or midline shift.      Encephalomalacia in the right hemisphere similar compared to prior  imaging is compatible with remote infarct. Additional nonspecific  scattered white matter hypodensities favored to represent sequela of  small vessel ischemia. Cervical spondylosis without acute loss of  vertebral body height or traumatic malalignment.          MACRO:  None.      Signed by: Evan Finkelstein 4/4/2025 10:33 PM  Dictation workstation:   [id number]   XR chest 2 views [[id number]] Collected: 04/04/25 2308   Order Status: Completed Updated: 04/04/25 2315   Narrative:     STUDY:  Chest Radiographs;  4/4/2025 9:37PM  INDICATION:  Fall.  COMPARISON:  XR Chest: 2/13/2023  ACCESSION NUMBER(S):  [id number]  ORDERING CLINICIAN:  DOT HUSTON  TECHNIQUE:  Frontal and lateral chest.  FINDINGS:  CARDIOMEDIASTINAL SILHOUETTE:  Cardiomediastinal silhouette is mildly prominent and unchanged..     LUNGS:  Lungs are clear.     ABDOMEN:  No remarkable upper abdominal findings.     BONES:  No acute osseous changes.  Advanced degenerative changes are  demonstrated of both shoulder joints.   Impression:     No acute abnormality.  Signed by Rush Martin DO   XR tibia fibula left 2 views [[id number]] Collected: 04/04/25 2158   Order Status: Completed Updated: 04/04/25 2204   Narrative:     STUDY:  Tibia and Fibula Radiographs; 04/004/2025 9:36 PM  INDICATION:  Fall.  COMPARISON:  None.  ACCESSION NUMBER(S):  [id number]  ORDERING CLINICIAN:  DOT HUSTON  TECHNIQUE:  Two view(s) of the left tibia and fibula.  FINDINGS:    There is no displaced fracture.  The alignment is anatomic.  No soft  tissue abnormality is seen. There are degenerative changes of the knee  and ankle joint   Impression:     No acute osseous abnormality.  Degenerative changes.  Signed by Davin Doyle MD   XR pelvis 1-2 views [[id number]] Collected: 04/04/25 2159   Order Status: Completed  Updated: 04/04/25 2205   Narrative:     STUDY:  Pelvis Radiographs; 04/04/2025 9:36 PM  INDICATION:  Fall.  COMPARISON:  XR right hip pelvis 12/15/2021.  ACCESSION NUMBER(S):  [id number]  ORDERING CLINICIAN:  DOT HUSTON  TECHNIQUE:  Single view of the pelvis.  FINDINGS:    The pelvic ring is intact.  There is no acute fracture. Bilateral  degenerative changes of the hips.   Impression:     No acute osseous abnormality.  Bilateral degenerative changes of the hips..  Signed by Davin Doyle MD                  Medical Problems         Problem List         * (Principal) Generalized weakness     Arteriosclerotic coronary artery disease     Benign essential hypertension     Hyperlipidemia     Volvulus of colon (Multi)     Vascular insufficiency of intestine (Multi)     Sensorineural hearing loss (SNHL) of both ears                  Above medical problems may be reflective of historical medical problems that may have resolved and may not related to acute clinical condition/medical problems.     Clinical impression/plan:  Expand All Collapse All    SUBJECTIVE      Pt requiring sitter due to impulsivity  Has been having visual hallucinations     4/7:              Today the patient is found awake alert and interactive appropriately sitting in the bedside chair.  Complains only of feeling tired and weak.  No acute events overnight.  Her Coreg was held yesterday due to bradycardia.  Continues today with heart rate ranging from 54-79.  Asymptomatic with this.  Blood pressure stable.  Otherwise denies interval new symptoms or complaints including chest pain palpitations pleuritic type pain unusual shortness of breath cough sputum nausea abdominal pain flank pain fever or chills.     4/8:              Today the patient remains awake alert and interactive appropriately in bed.  No acute events overnight.  Voices no specific complaints.  Heart rate has remained mildly bradycardic and asymptomatic.  Coreg continues to  be held.  Not requiring a sitter.  At this time awaiting authorization for SNF. Otherwise denies interval new symptoms or complaints including chest pain palpitations pleuritic type pain unusual shortness of breath cough sputum nausea abdominal pain flank pain fever or chills     4/9:               Today the patient once again is awake alert and interactive appropriately in bed.  Specific complaints and no acute events overnight.  Heart rate appears improved in the normal range today.  Blood pressures trending up and may need to resume Coreg.  Continuing to await authorization for SNF. Otherwise denies interval new symptoms or complaints including chest pain palpitations pleuritic type pain unusual shortness of breath cough sputum nausea abdominal pain flank pain fever or chills     4/10:               Today the patient is found sleeping but does awaken to name and exam.  Voices no specific complaints and no acute events overnight.  Continues with good heart rate and blood pressure levels.  Notified of insurance denial for SNF and awaiting appeal which likely will be on the weekend.  Otherwise denies interval new symptoms or complaints including chest pain palpitations pleuritic type pain unusual shortness of breath cough sputum nausea abdominal pain flank pain fever or chills     4/11:               Today patient is seen in the bedside chair more awake alert and interactive appropriately.  Voices no specific complaints and no acute complaints.  Today continues with mild asymptomatic bradycardia.  Continues to do well on room air.  Blood pressure normal and stable.  Continuing at this point to await appeal for SNF. Otherwise denies interval new symptoms or complaints including chest pain palpitations pleuritic type pain unusual shortness of breath cough sputum nausea abdominal pain flank pain fever or chills     4/12:               Today patient seen in bed and remains awake alert and interactive appropriately.   "Voices no specific complaints and no acute events overnight.  Continues on room air.  Vital signs otherwise normal and stable. Continuing at this point to await appeal for SNF. Otherwise denies interval new symptoms or complaints including chest pain palpitations pleuritic type pain unusual shortness of breath cough sputum nausea abdominal pain flank pain fever or chills     4/13:                Today patient seen once again in bed.  Sleeping but awakens easily to name and exam.  Interactive at her baseline otherwise.  Voices no specific complaints and no acute events overnight.  Continuing to await appeal for SNF.  Otherwise denies interval new symptoms or complaints including chest pain palpitations pleuritic type pain unusual shortness of breath cough sputum nausea abdominal pain flank pain fever or chills        OBJECTIVE:         Physical Exam  /62 (BP Location: Right arm, Patient Position: Lying)   Pulse 59   Temp 35.9 °C (96.7 °F) (Temporal)   Resp 18   Ht 1.651 m (5' 5\")   Wt 74.8 kg (164 lb 14.5 oz)   SpO2 97%   BMI 27.44 kg/m²   Body mass index is 27.44 kg/m².     GEN - NAD, slender elderly  female awake alert and interactive appropriately  PULM - CTA  CVS - RRR, normal rate, 2/6 murmur  ABD - soft and nontender  EXTR - trace bilat LE edema  Psych: Pleasant and cooperative to exam     Scheduled medications     Scheduled Medications   aspirin, 81 mg, oral, Daily  atorvastatin, 20 mg, oral, Nightly  [Held by provider] carvedilol, 3.125 mg, oral, BID  enoxaparin, 30 mg, subcutaneous, q24h  lisinopril, 10 mg, oral, Daily  pantoprazole, 40 mg, oral, Daily before breakfast   Or  pantoprazole, 40 mg, intravenous, Daily before breakfast         Continuous medications  Continuous Medications          PRN medications  PRN Medications   PRN medications: acetaminophen, guaiFENesin, LORazepam, melatonin, ondansetron, polyethylene glycol         Labs          Results from last 7 days   Lab Units " "04/08/25  0425   WBC AUTO x10*3/uL 6.0   HEMOGLOBIN g/dL 9.6*   HEMATOCRIT % 30.8*   PLATELETS AUTO x10*3/uL 260                Results from last 7 days   Lab Units 04/09/25  0449 04/08/25  0425   SODIUM mmol/L 137 138   POTASSIUM mmol/L 4.3 4.0   CHLORIDE mmol/L 106 107   CO2 mmol/L 25 25   BUN mg/dL 24* 22   CREATININE mg/dL 1.19* 1.29*   CALCIUM mg/dL 9.7 8.9   GLUCOSE mg/dL 80 88            Microbiology:   No results found for the last 90 days.                     No lab exists for component: \"AGALPCRNB\"   .ID              Lab Results   Component Value Date     URINECULTURE   04/05/2025       Growth indicates contamination with periurethral roly. Repeat culture if clinically indicated.            ASSESSMENT & PLAN:      1)  Fall/Debility  -  no fractures or acute injuries found during workup  - PT eval pending and will likely need SNF.  PT AMPAC is 16 - even if not qualifying for SNF it sounds like she will need placement due to presumed dementia and unsafe living independently  - sounds like family has 24/7 caregivers and has cameras in the home for safety  - no other acute metabolic or infectious etiologies found  4/7: AM-PAC 15.  Working towards SNF.  4/13: Initially denied by insurance.  Transitional care pursuing appeal.     2)  Possible UTI  -  empiric Keflex started but culture suggests contamination >> will stop abx     3) CAD/HTN/bradycardia  -  resumed home meds  - has been bradycardic and will hold coreg  4/7: Continues today with mild asymptomatic bradycardia.    4/9: Heart rates primarily good range and normal today.  Blood pressure perhaps starting to trend up and may need to resume Coreg.  Continue to monitor.          4) Mild asymptomatic hyponatremia  - Monitor free water       Disposition/additional care plan/interventions:4/18/2025     Patient appeared clinically well.     Blood pressure Controlled.........................Continue hydralazine and lisinopril.     Heart rate at 55 but patient " asymptomatic.     Monitor off Coreg     Continue current antihypertensive therapy      Disposition/additional care plan/interventions:4/19/2025        Chronic cognitive impairmentWith observed behavioral disturbance but patient directable.     Check UA for completeness     A.m. labs: Basic metabolic panel and CBC     Avoid hypotension     Care plan discussed in detail with patient's nurse.     Continue supportive care  Discharge barrier remains: Awaiting appeal for ECF.        Disposition/additional care plan/interventions:4/20/2025    Sodium to 132 today will monitor closely.    Avoid excessive free water.    Patient appeared euvolemic    Chronic renal injury upward trending of creatinine today in comparison to 4/16/2025 however creatinine as high as 1.4 4/15/2025    Initial suspicion earlier in hospital course of possible UTI empirical Keflex apparently was started but culture suggested contamination and antibiotic was stopped..    Blood pressure controlled    Asymptomatic bradycardia    Patient appeared pleasant today.    Await urine culture we will hold off on empirical antibiotic therapy          Discharge planning: Discharge barrier awaiting appeal determination for skilled nursing facility.

## 2025-04-20 NOTE — PROGRESS NOTES
Physical Therapy                 Therapy Communication Note    Patient Name: Sylvia Moody  MRN: [id number]  Department: [facility]  Room: 2311/2311-B  Today's Date: 4/20/2025     Discipline: Physical Therapy          Missed Visit Reason: Missed Visit Reason:  (pt up in chiar sleeping. pt declined stating she is comfortable and doesnt want to get up.)    Missed Time: Attempt    Comment:

## 2025-04-21 VITALS
SYSTOLIC BLOOD PRESSURE: 126 MMHG | HEART RATE: 59 BPM | WEIGHT: 164.9 LBS | HEIGHT: 65 IN | OXYGEN SATURATION: 98 % | BODY MASS INDEX: 27.47 KG/M2 | TEMPERATURE: 97.7 F | DIASTOLIC BLOOD PRESSURE: 60 MMHG | RESPIRATION RATE: 16 BRPM

## 2025-04-21 LAB
ANION GAP SERPL CALC-SCNC: 11 MMOL/L (ref 10–20)
BACTERIA UR CULT: NORMAL
BUN SERPL-MCNC: 36 MG/DL (ref 6–23)
CALCIUM SERPL-MCNC: 9.5 MG/DL (ref 8.6–10.3)
CHLORIDE SERPL-SCNC: 106 MMOL/L (ref 98–107)
CO2 SERPL-SCNC: 24 MMOL/L (ref 21–32)
CREAT SERPL-MCNC: 1.39 MG/DL (ref 0.5–1.05)
EGFRCR SERPLBLD CKD-EPI 2021: 35 ML/MIN/1.73M*2
GLUCOSE SERPL-MCNC: 88 MG/DL (ref 74–99)
POTASSIUM SERPL-SCNC: 4.7 MMOL/L (ref 3.5–5.3)
SODIUM SERPL-SCNC: 136 MMOL/L (ref 136–145)

## 2025-04-21 PROCEDURE — 2500000001 HC RX 250 WO HCPCS SELF ADMINISTERED DRUGS (ALT 637 FOR MEDICARE OP)

## 2025-04-21 PROCEDURE — 97116 GAIT TRAINING THERAPY: CPT | Mod: GP,CQ

## 2025-04-21 PROCEDURE — 97110 THERAPEUTIC EXERCISES: CPT | Mod: GP,CQ

## 2025-04-21 PROCEDURE — 99239 HOSP IP/OBS DSCHRG MGMT >30: CPT | Performed by: HOSPITALIST

## 2025-04-21 PROCEDURE — 36415 COLL VENOUS BLD VENIPUNCTURE: CPT | Performed by: HOSPITALIST

## 2025-04-21 PROCEDURE — 80048 BASIC METABOLIC PNL TOTAL CA: CPT | Performed by: HOSPITALIST

## 2025-04-21 PROCEDURE — 2500000001 HC RX 250 WO HCPCS SELF ADMINISTERED DRUGS (ALT 637 FOR MEDICARE OP): Performed by: INTERNAL MEDICINE

## 2025-04-21 PROCEDURE — 2500000001 HC RX 250 WO HCPCS SELF ADMINISTERED DRUGS (ALT 637 FOR MEDICARE OP): Performed by: HOSPITALIST

## 2025-04-21 PROCEDURE — G0378 HOSPITAL OBSERVATION PER HR: HCPCS

## 2025-04-21 RX ORDER — LISINOPRIL 5 MG/1
5 TABLET ORAL DAILY
Start: 2025-04-22

## 2025-04-21 RX ORDER — LISINOPRIL 5 MG/1
5 TABLET ORAL DAILY
Status: DISCONTINUED | OUTPATIENT
Start: 2025-04-22 | End: 2025-04-21 | Stop reason: HOSPADM

## 2025-04-21 RX ORDER — POLYETHYLENE GLYCOL 3350 17 G/17G
17 POWDER, FOR SOLUTION ORAL DAILY PRN
Start: 2025-04-21

## 2025-04-21 RX ORDER — HYDRALAZINE HYDROCHLORIDE 10 MG/1
10 TABLET, FILM COATED ORAL 2 TIMES DAILY
Start: 2025-04-21

## 2025-04-21 RX ORDER — ACETAMINOPHEN 325 MG/1
650 TABLET ORAL EVERY 4 HOURS PRN
Start: 2025-04-21

## 2025-04-21 RX ORDER — TALC
3 POWDER (GRAM) TOPICAL NIGHTLY PRN
Start: 2025-04-21

## 2025-04-21 RX ORDER — HYDRALAZINE HYDROCHLORIDE 10 MG/1
10 TABLET, FILM COATED ORAL 2 TIMES DAILY
Status: DISCONTINUED | OUTPATIENT
Start: 2025-04-21 | End: 2025-04-21 | Stop reason: HOSPADM

## 2025-04-21 RX ORDER — DOCUSATE SODIUM 100 MG/1
100 CAPSULE, LIQUID FILLED ORAL 2 TIMES DAILY
Start: 2025-04-21

## 2025-04-21 RX ADMIN — HYDRALAZINE HYDROCHLORIDE 10 MG: 10 TABLET ORAL at 08:18

## 2025-04-21 RX ADMIN — LISINOPRIL 10 MG: 10 TABLET ORAL at 08:17

## 2025-04-21 RX ADMIN — PANTOPRAZOLE SODIUM 40 MG: 40 TABLET, DELAYED RELEASE ORAL at 05:32

## 2025-04-21 RX ADMIN — DOCUSATE SODIUM 100 MG: 100 CAPSULE, LIQUID FILLED ORAL at 08:18

## 2025-04-21 RX ADMIN — ASPIRIN 81 MG: 81 TABLET, COATED ORAL at 08:17

## 2025-04-21 ASSESSMENT — COGNITIVE AND FUNCTIONAL STATUS - GENERAL
CLIMB 3 TO 5 STEPS WITH RAILING: A LITTLE
MOBILITY SCORE: 20
MOVING TO AND FROM BED TO CHAIR: A LITTLE
PERSONAL GROOMING: A LITTLE
STANDING UP FROM CHAIR USING ARMS: A LITTLE
WALKING IN HOSPITAL ROOM: A LITTLE
TURNING FROM BACK TO SIDE WHILE IN FLAT BAD: A LITTLE
MOBILITY SCORE: 16
DRESSING REGULAR LOWER BODY CLOTHING: A LITTLE
DAILY ACTIVITIY SCORE: 18
CLIMB 3 TO 5 STEPS WITH RAILING: TOTAL
MOVING FROM LYING ON BACK TO SITTING ON SIDE OF FLAT BED WITH BEDRAILS: A LITTLE
HELP NEEDED FOR BATHING: A LITTLE
STANDING UP FROM CHAIR USING ARMS: A LITTLE
MOVING TO AND FROM BED TO CHAIR: A LITTLE
TOILETING: A LITTLE
WALKING IN HOSPITAL ROOM: A LITTLE
DRESSING REGULAR UPPER BODY CLOTHING: A LITTLE
EATING MEALS: A LITTLE

## 2025-04-21 ASSESSMENT — PAIN - FUNCTIONAL ASSESSMENT
PAIN_FUNCTIONAL_ASSESSMENT: 0-10
PAIN_FUNCTIONAL_ASSESSMENT: 0-10

## 2025-04-21 ASSESSMENT — PAIN SCALES - GENERAL
PAINLEVEL_OUTOF10: 0 - NO PAIN
PAINLEVEL_OUTOF10: 0 - NO PAIN

## 2025-04-21 NOTE — PROGRESS NOTES
Physical Therapy    Physical Therapy Treatment    Patient Name: Sylvia Moody  MRN: [id number]  Department: [facility] [address] [facility]  Room: 231/2311-B  Today's Date: 4/21/2025  Time Calculation  Start Time: 0756  Stop Time: 0819  Time Calculation (min): 23 min         Assessment/Plan   PT Assessment  PT Assessment Results: Decreased strength, Decreased endurance, Impaired balance, Impaired hearing  Barriers to Discharge Home: Caregiver assistance, Cognition needs, Physical needs  End of Session Communication: Bedside nurse  Assessment Comment: Patrient tolerated tx session well with progression of gait distance from 15' x1 to 64'x1 and 88'x1 with FWW. Patient still requires maximal directional cueing during gait trg and transfers for proper hand placement. Patient will cont. to progress towards goals and ADL's to return to PLOF.  End of Session Patient Position: Up in chair, Alarm on  PT Plan  Inpatient/Swing Bed or Outpatient: Inpatient  PT Plan  Treatment/Interventions: Transfer training, Gait training, Stair training, Balance training, Neuromuscular re-education, Strengthening, Endurance training, Therapeutic exercise, Therapeutic activity, Postural re-education, Positioning, Bed mobility  PT Plan: Ongoing PT  PT Frequency: 3 times per week  PT Discharge Recommendations: Moderate intensity level of continued care      General Visit Information:   PT  Visit  PT Received On: 04/21/25  Response to Previous Treatment: Patient with no complaints from previous session.  General  Reason for Referral: Impaired Mobility  Past Medical History Relevant to Rehab: CAD s/p CABG, dyslipidemia, recent fall  Family/Caregiver Present: No  Prior to Session Communication: Bedside nurse  Patient Position Received: Up in chair, Alarm on  General Comment: Patient alert and oriented for therapy session today.    Subjective   Precautions:  Precautions  Hearing/Visual Limitations: [place]: no hearing aids in ED  Medical Precautions: Fall precautions      Date/Time Vitals Session Patient Position Pulse Resp SpO2 BP MAP (mmHg)    25 0854 --  --  59  16  98 %  126/60  82                 Objective   Pain:  Pain Assessment  Pain Assessment: 0-10  0-10 (Numeric) Pain Score: 0 - No pain  Cognition:  Cognition  Orientation Level: Disoriented to place, Disoriented to time, Disoriented to situation  Coordination:     Postural Control:  Static Standing Balance  Static Standing-Balance Support: Bilateral upper extremity supported  Static Standing-Level of Assistance: Contact guard  Static Standing-Comment/Number of Minutes: Patient standing x2 minutes within FWW.    Activity Tolerance:  Activity Tolerance  Endurance: Tolerates 10 - 20 min exercise with multiple rests  Treatments:  Therapeutic Exercise  Therapeutic Exercise Performed: Yes  Therapeutic Exercise Activity 1: Performed seated eric LE exercises; alternating hip flexion x20, LAQ x10, ankle pumps x10. Performed seated eric UE scapular retraction exercises x10 to improve fwd flexed posture.    Ambulation/Gait Training  Ambulation/Gait Training Performed: Yes  Ambulation/Gait Training 1  Surface 1: Level tile  Device 1: Rolling walker  Assistance 1: Minimum assistance, Moderate verbal cues  Quality of Gait 1: Inconsistent stride length, Diminished heel strike, Forward flexed posture  Comments/Distance (ft) 1: Gait tr'x1, 86'x1 within FWW. Patient required maximal verbal cuieng to correct upright posture.  Transfers  Transfer: Yes  Transfer 1  Transfer From 1: Chair with arms to  Transfer to 1: Stand  Technique 1: Sit to stand  Transfer Device 1: Walker  Transfer Level of Assistance 1: Moderate verbal cues, Minimum assistance  Transfers 2  Transfer From 2: Stand to  Transfer to 2: Chair with arms  Technique 2: Stand to sit  Transfer Device 2: Walker  Transfer Level of Assistance 2: Moderate verbal cues, Minimum assistance  Transfers 3  Transfer From 3: Chair with arms to  Transfer to 3: Stand  Technique 3: Sit to  stand  Transfer Device 3: Walker  Transfer Level of Assistance 3: Moderate verbal cues, Minimum assistance  Transfers 4  Transfer From 4: Stand to  Transfer to 4: Chair with arms  Technique 4: Stand to sit  Transfer Device 4: Walker  Transfer Level of Assistance 4: Moderate verbal cues, Minimum assistance    Outcome Measures:  [facility] Basic Mobility  Turning from your back to your side while in a flat bed without using bedrails: A little  Moving from lying on your back to sitting on the side of a flat bed without using bedrails: A little  Moving to and from bed to chair (including a wheelchair): A little  Standing up from a chair using your arms (e.g. wheelchair or bedside chair): A little  To walk in hospital room: A little  Climbing 3-5 steps with railing: Total  Basic Mobility - Total Score: 16    Education Documentation  Mobility Training, taught by Gianna D'Amico, S-PTA at 4/21/2025  9:16 AM.  Learner: Patient  Readiness: Acceptance  Method: Explanation, Demonstration  Response: Verbalizes Understanding, Needs Reinforcement    Precautions, taught by Gianna D'Amico, S-PTA at 4/21/2025  9:16 AM.  Learner: Patient  Readiness: Acceptance  Method: Explanation, Demonstration  Response: Verbalizes Understanding, Needs Reinforcement    ADL Training, taught by Gianna D'Amico, S-PTA at 4/21/2025  9:16 AM.  Learner: Patient  Readiness: Acceptance  Method: Explanation, Demonstration  Response: Verbalizes Understanding, Needs Reinforcement    Education Comments  No comments found.         Encounter Problems       Encounter Problems (Active)       Balance       STG - Maintains static standing balance without upper extremity support and Supervision (Progressing)       Start:  04/05/25    Expected End:  04/19/25       INTERVENTIONS:1. Practice standing with minimal support.2. Educate patient about maintaining total hip precautions while maintaining balance.3. Educate patient about standing tolerance.4. Educate patient about  independence with gait, transfers, and ADL's.5. Educate patient about use of assistive device.6. Educate patient about self-directed care.            Mobility       STG - Patient will ambulate 50ft with FWW safely with Supervision (Progressing)       Start:  04/05/25    Expected End:  04/19/25               PT Transfers       STG - Patient will transfer sit to and from stand using fWW with safe technique and Supervision (Progressing)       Start:  04/05/25    Expected End:  04/19/25               Pain - Adult            Gianna D'Amico, S-PTA

## 2025-04-21 NOTE — NURSING NOTE
Patient being discharged to [facility] via EMS. AVS printed and given to patient, family, and/or caregiver. All education provided and additional questions answered at this time. IV's removed and all belongings sent with patient. Report called to facility, AVS and information sent in packet. Patient instructed that in event of an emergency to call 911 or go to local emergency room.       04/21/25 at 1:26 PM - BRANDI MARTINEZ RN

## 2025-04-21 NOTE — CARE PLAN
The patient's goals for the shift include      The clinical goals for the shift include No falls    Over the shift, the patient did not make progress toward the following goals. Barriers to progression include . Recommendations to address these barriers include   Problem: Pain - Adult  Goal: Verbalizes/displays adequate comfort level or baseline comfort level  Outcome: Progressing     Problem: Safety - Adult  Goal: Free from fall injury  Outcome: Progressing     Problem: Discharge Planning  Goal: Discharge to home or other facility with appropriate resources  Outcome: Progressing     Problem: Chronic Conditions and Co-morbidities  Goal: Patient's chronic conditions and co-morbidity symptoms are monitored and maintained or improved  Outcome: Progressing     Problem: Nutrition  Goal: Nutrient intake appropriate for maintaining nutritional needs  Outcome: Progressing     Problem: Skin  Goal: Decreased wound size/increased tissue granulation at next dressing change  Outcome: Progressing  Goal: Participates in plan/prevention/treatment measures  Outcome: Progressing  Goal: Prevent/manage excess moisture  Outcome: Progressing  Goal: Prevent/minimize sheer/friction injuries  Outcome: Progressing  Goal: Promote/optimize nutrition  Outcome: Progressing  Goal: Promote skin healing  Outcome: Progressing     Problem: Fall/Injury  Goal: Not fall by end of shift  Outcome: Progressing  Goal: Be free from injury by end of the shift  Outcome: Progressing  Goal: Verbalize understanding of personal risk factors for fall in the hospital  Outcome: Progressing  Goal: Verbalize understanding of risk factor reduction measures to prevent injury from fall in the home  Outcome: Progressing  Goal: Use assistive devices by end of the shift  Outcome: Progressing  Goal: Pace activities to prevent fatigue by end of the shift  Outcome: Progressing   .

## 2025-04-21 NOTE — PROGRESS NOTES
Nutrition Follow Up Assessment:   Nutrition Assessment    Reason for Assessment: Length of stay    Medical history per chart: CAD s/p prior CABG, HTN, DLD presenting with unwitnessed fall      Admitted for fall, UTI without hematuria, generalized weakness     4/21: RD working remotely. Discussed with RN via Secure Chat. RN reported pt oriented to self only, no GI issues, and good appetite today. Pt missing teeth noted in Flowsheet. RN stated pt does not have dentures but eats well with current diet consistency. Per internal Medicine 4/20 note pt with mild asymptomatic hyponatremia, monitoring free water. 1.6 L fluid restriction noted. Na today WDL. PO intake as below. Will send Magic Cup BID. Pt continues to await SNF placement noted. Pt admission Dx, PMH, labs, medications, allergies, diet orders, skin integrity reviewed. RD to follow per POC, protocol.     4/11: Screening pt for LOS. Pt sleeping at time of visit. Presumed dementia noted in H&P. RN reported mentation better today, no GI complaints. Pt eating well, mostly % of meals. Pt weight >100% IBW, skin intact, labs and medications noted. RD will follow per protocol, available for consult if needed.     Nutrition History:  Food and Nutrient History: PO intake was good on 4/11 LOS screen, has since decreased.  Vitamin/Herbal Supplement Use: None per Home Meds list.       Average meal Intake during admission:  averaged 54% since 4/11/25    Dietary Orders (From admission, onward)       Start     Ordered    04/21/25 0841  Oral nutritional supplements  Until discontinued        Question Answer Comment   Deliver with Breakfast    Deliver with Dinner    Select supplement: Magic Cup        04/21/25 0840    04/20/25 1725  Adult diet Regular; 1600 mL fluid  Diet effective now        Question Answer Comment   Diet type Regular    Dietary fluid restriction / 24h: 1600 mL fluid        04/20/25 1724    04/05/25 1804  May Participate in Room Service With Assistance   "( ROOM SERVICE MAY PARTICIPATE WITH ASSISTANCE)  Once        Question:  .  Answer:  Yes    04/05/25 1580                    Anthropometrics:  Height: 165.1 cm (5' 5\")   Weight: 74.8 kg (164 lb 14.5 oz)   BMI (Calculated): 27.44  IBW/kg (Dietitian Calculated): 56.8 kg        Weight History:   Wt Readings from Last 10 Encounters:   04/05/25 74.8 kg (164 lb 14.5 oz)   07/29/22 77.1 kg (170 lb)       Weight Change %:  Weight History / % Weight Change: Last recorded weight of 74.8 kg (4/5/25), no weight history during past 12 months available in chart review. Will monitor trends.  Significant Weight Loss:  (Unable to determine at this time)    Nutrition Focused Physical Exam Findings:  Subcutaneous Fat Loss:   Defer Subcutaneous Fat Loss Assessment: Defer all  Defer All Reason: RD working remotely  Muscle Wasting:  Defer Muscle Wasting Assessment: Defer all  Defer All Reason: RD working remotely  Edema:  Edema: +1 trace (per RN)  Edema Location: RLE and LLE  Physical Findings:  Skin: Negative  Teeth Findings: Other (comment) (Missing some teeth per Flowsheet)    Nutrition Significant Labs:    Results from last 7 days   Lab Units 04/21/25  0314 04/20/25  0322 04/16/25  0438   GLUCOSE mg/dL 88 95 76   SODIUM mmol/L 136 132* 137   POTASSIUM mmol/L 4.7 4.9 4.5   CHLORIDE mmol/L 106 106 106   CO2 mmol/L 24 23 23   BUN mg/dL 36* 38* 22   CREATININE mg/dL 1.39* 1.34* 1.23*   EGFR mL/min/1.73m*2 35* 37* 41*   CALCIUM mg/dL 9.5 9.3 10.3     No results found for: \"HGBA1C\"        Nutrition Specific Medications:  Meds reviewed/noted.   Scheduled Medications[1]   Continuous Medications[2]     I/O:   Last BM Date: 04/18/25; Stool Appearance: Formed, Soft (04/20/25 6745)    Estimated Needs:   Total Energy Estimated Needs in 24 hours (kCal):  (6827-5420)  Method for Estimating Needs: 25-30 kcal/kg IBW  Total Protein Estimated Needs in 24 Hours (g):  (45-60)  Method for Estimating 24 Hour Protein Needs: 0.8-1 g/kg IBW  Total Fluid " Estimated Needs in 24 Hours (mL):  (9810-4034)  Method for Estimating 24 Hour Fluid Needs: 1 ml/kcal or per physician        Nutrition Diagnosis   Malnutrition Diagnosis  Patient has Malnutrition Diagnosis:  (Unable to determine at this time)    Nutrition Diagnosis  Patient has Nutrition Diagnosis: Yes  Diagnosis Status (1): New  Nutrition Diagnosis 1: Predicted inadequate energy intake  Related to (1): mental status  As Evidenced by (1): Recorded PO intake averaged <75% for >1  week, pt reported disoriented       Nutrition Interventions/Recommendations   Nutrition prescription for oral nutrition    Nutrition Recommendations:  Individualized Nutrition Prescription Provided for : Continue current diet, consider to liberalize fluid restriction as appropriate. Will send ONS BID.    Nutrition Interventions/Goals:   Interventions: Meals and snacks, Medical food supplement  Meals and Snacks: Fluid-modified diet, General healthful diet  Goal: Consume >75% of meals  Medical Food Supplement: Commercial food medical food supplement therapy  Goal: Consume Magic Cup BID  Coordination of Care with Providers: Nursing (Nydia RN)      Education Documentation  No documentation found.            Nutrition Monitoring and Evaluation   Food/Nutrient Related History Monitoring  Monitoring and Evaluation Plan: Intake / amount of food  Intake / Amount of food: Consumes at least 75% or more of meals/snacks/supplements    Anthropometric Measurements  Monitoring and Evaluation Plan: Body weight  Body Weight: Body weight - Maintain stable weight    Biochemical Data, Medical Tests and Procedures  Monitoring and Evaluation Plan: Electrolyte/renal panel, Glucose/endocrine profile  Electrolyte and Renal Panel: Electrolytes within normal limits  Glucose/Endocrine Profile: Glucose within normal limits ( mg/dL)    Physical Exam Findings  Monitoring and Evaluation Plan: Adipose, Muscles, Skin  Adipose Finding: Other (Comment) (monitor for  loss)  Criteria: WDL  Muscle Finding: Other (Comment) (monitor for loss)  Criteria: WDL  Skin Finding: Promote intact skin - Promote skin integrity    Goal Status: New goal(s) identified    Time Spent (min): 45 minutes              [1] aspirin, 81 mg, oral, Daily  atorvastatin, 20 mg, oral, Nightly  [Held by provider] carvedilol, 3.125 mg, oral, BID  docusate sodium, 100 mg, oral, BID  enoxaparin, 30 mg, subcutaneous, q24h  hydrALAZINE, 10 mg, oral, Daily  lisinopril, 10 mg, oral, Daily  pantoprazole, 40 mg, oral, Daily before breakfast   Or  pantoprazole, 40 mg, intravenous, Daily before breakfast  [2]

## 2025-04-21 NOTE — PROGRESS NOTES
4/21/25 0908   04/21/25 0908   Discharge Planning   Home or Post Acute Services Post acute facilities (Rehab/SNF/etc)   Type of Post Acute Facility Services Skilled nursing   Expected Discharge Disposition SNF   What day is the transport expected? 04/21/25   Intensity of Service   Intensity of Service >30 min     Sent message out to MD notifying them that auth was approved for [facility]. Awaiting orders. Called and updated daughter, Anat. Anat expressed gratitude for the approval and update. Will set up transportation once discharge orders come through.   Nanci Mena RN, BSN, Dina/ Tiesha CT Supervisor     4/21/25 1205  Pt daughter and med team aware of 1300  to [facility]. Bedside RN has report number. Ambulance form on chart.   Nanci Mena RN, BSN, Dina/ Tiesha CT Supervisor

## 2025-04-21 NOTE — DISCHARGE INSTRUCTIONS
Recommend outpatient follow-up with audiology, audiology referral.    Recommend BMP within the next 24 to 48 hours monitor creatinine/renal function.    Also monitor sodium levels, if drop again, consider a 2 L fluid restriction.    Patient should seek medical attention immediately if condition should worsen, new symptoms develop , no further improvement or recurrence of presenting symptomatology.

## 2025-04-21 NOTE — PROGRESS NOTES
Sylvia Johnson [id number]   Service: Internal Medicine / Hospitalist Date of service: 4/21/2025                                  DNR and No Intubation                        Subjective       History Of Present Illness (HPI):  Sylvia Moody is a 95 y.o. female with PMHx s/f CAD s/p prior CABG, HTN, DLD presenting with unwitnessed fall. She is a poor historian and doesn't remember what happened, most history obtained by chart review.  Per ambulance record, EMS was requested for patient with a fall and had a leg injury.  On EMS arrival family stated that she had fallen and was able to get her up into a chair.  Her family member thought she may have a broken hip as her leg appeared outwardly rotated.  She is acting normal for her per family and is usually ANO x 2 at baseline.  She is unable to recount how she fell or how long she was down or if she hit her head.  Denies headache, neck pain, chest pain, SOB, dysuria, focal or lateralizing weakness.     ED Course:   Vitals on presentation: T 36.4 °C (97.5 °F)  HR 52  BP (!) 188/84  RR 18  O2 99 % None (Room air)  Labs:   CMP, CBC largely unremarkable  Lactate 0.6  Troponin 66 BNP 42  UA-leukocyte esterase 500, WBC 21-50  EKG: Sinus rhythm at 77 bpm, RBBB and LAFB.  , QTc 502.  Imaging - agree with radiology interpretation(s):   XR tib-fib left, pelvis-no acute osseous abnormality denies generative changes  CT head-, CT cervical spine-no acute intracranial abnormality.  Encephalomalacia in the right hemisphere similar compared to prior imaging with remote infarct.  Nonspecific scattered white matter hypodensities favoring sequela small vessel ischemia.  Cervical spondylosis.  XR chest-no acute abnormality  XR shoulder left-degenerative changes and osteopenia  Interventions: Tylenol 650 mg, aspirin 81 mg,  ml, started on Keflex 500 mg every 12 hours, admission for further management.     4/7:              Today the patient is found awake alert and  interactive appropriately sitting in the bedside chair.  Complains only of feeling tired and weak.  No acute events overnight.  Her Coreg was held yesterday due to bradycardia.  Continues today with heart rate ranging from 54-79.  Asymptomatic with this.  Blood pressure stable.  Otherwise denies interval new symptoms or complaints including chest pain palpitations pleuritic type pain unusual shortness of breath cough sputum nausea abdominal pain flank pain fever or chills.     4/8:              Today the patient remains awake alert and interactive appropriately in bed.  No acute events overnight.  Voices no specific complaints.  Heart rate has remained mildly bradycardic and asymptomatic.  Coreg continues to be held.  Not requiring a sitter.  At this time awaiting authorization for SNF. Otherwise denies interval new symptoms or complaints including chest pain palpitations pleuritic type pain unusual shortness of breath cough sputum nausea abdominal pain flank pain fever or chills     4/9:               Today the patient once again is awake alert and interactive appropriately in bed.  Specific complaints and no acute events overnight.  Heart rate appears improved in the normal range today.  Blood pressures trending up and may need to resume Coreg.  Continuing to await authorization for SNF. Otherwise denies interval new symptoms or complaints including chest pain palpitations pleuritic type pain unusual shortness of breath cough sputum nausea abdominal pain flank pain fever or chills     4/10:               Today the patient is found sleeping but does awaken to name and exam.  Voices no specific complaints and no acute events overnight.  Continues with good heart rate and blood pressure levels.  Notified of insurance denial for SNF and awaiting appeal which likely will be on the weekend.  Otherwise denies interval new symptoms or complaints including chest pain palpitations pleuritic type pain unusual shortness of  breath cough sputum nausea abdominal pain flank pain fever or chills     4/11:               Today patient is seen in the bedside chair more awake alert and interactive appropriately.  Voices no specific complaints and no acute complaints.  Today continues with mild asymptomatic bradycardia.  Continues to do well on room air.  Blood pressure normal and stable.  Continuing at this point to await appeal for SNF. Otherwise denies interval new symptoms or complaints including chest pain palpitations pleuritic type pain unusual shortness of breath cough sputum nausea abdominal pain flank pain fever or chills     4/12:               Today patient seen in bed and remains awake alert and interactive appropriately.  Voices no specific complaints and no acute events overnight.  Continues on room air.  Vital signs otherwise normal and stable. Continuing at this point to await appeal for SNF. Otherwise denies interval new symptoms or complaints including chest pain palpitations pleuritic type pain unusual shortness of breath cough sputum nausea abdominal pain flank pain fever or chills     4/13:                Today patient seen once again in bed.  Sleeping but awakens easily to name and exam.  Interactive at her baseline otherwise.  Voices no specific complaints and no acute events overnight.  Continuing to await appeal for SNF.  Otherwise denies interval new symptoms or complaints including chest pain palpitations pleuritic type pain unusual shortness of breath cough sputum nausea abdominal pain flank pain fever or chills     4/14................  Patient up in chair, disclose no acute complaints mentioned that overall she felt better today.No reported :fevers, chills, headaches, chest pains, nausea or vomiting.     4/15...................Patient seen examined in the  presence of her nurse..No reported headaches, chest pains, dyspnea, nausea, vomiting, fevers or chills.     4/16..........Patient report doing well today voiced  appreciation of her care.  No reported: Headaches, chest pains, nausea, vomiting, fevers, chills or dysuria.     4/17.................The patient had no complaints at the time of evaluation.No reported: Dyspnea, chest pains, nausea, vomiting, fevers or chills.     4/18...............Late note entry patient seen and evaluated earlier this morning.  No provisional report of overnight events by nursing.  No acute complaints voiced at the time of evaluation.No reported: Headaches, chest pains, dyspnea, palpitations, nausea, vomiting, fevers or chills.     4/19...............................Patient seen and examined in the presence of her nurse.  Patient seems a bit agitated today.  No reported: Hallucinations, tremors, seizure activities, nausea, vomiting or respiratory distress.     4/20......................No reported: Hallucinations, fevers, chills, syncope, nausea, vomiting, chest pains or dyspnea.    4/21.........................No overnight events voiced by patient or patient's nurse.  No reported headaches, chest pains, nausea, vomiting, fevers or chills        Review of Systems:   Review of system otherwise negative if not aforementioned above in subjective.     Objective        Physical Exam      Constitutional:       Appearance: Patient appeared in no acute cardiopulmonary distress.     Comments: Patient alert and oriented to person place  and situation  to some degree it appeared.Known chronic cognitive impairment. Patient appeared pleasant today, appeared nontoxic.  HEENT:      Head: Normocephalic and atraumatic.Trachea midline      Nose:No observed congestion or rhinorrhea.     Mouth/Throat: Mucous membranes Moist, Trachea appeared  midline.  Eyes:      Extraocular Movements: Extraocular movements intact.      Pupils: Pupils are equal, round, and reactive to light.      Comments: No scleral icterus or conjunctival injection appreciated.   Cardiovascular:      Rate and Rhythm: Normal rate and regular  rhythm. No clicks rubs or gallops, normal S1 and S2.No peripheral stigmata of endocarditis appreciated.  Comment: Murmur 2 out of 6     Pulmonary:      Lungs appeared clear to auscultation, no adventitious sound appreciated.  Abdominal:      General: Abdomen soft, nontender, active bowel sounds, no involuntary guarding or rebound tenderness appreciated.     Comments: None   Musculoskeletal:       No pitting edema or cyanosis appreciated.No acute joint deformity appreciated to per hypertrophy of the range of motion for upper and lower extremities.       Skin:     General: Skin is warm.      Coloration:  No jaundice     Findings: No abnormal appearing skin rashes or lesions that appeared acute noted on unclothed area of the skin..   Neurological:      General: No  new appearing focal sensory or motor deficits appreciated, no meningeal signs or dysmetria noted.   Cranial Nerves: Cranial nerves II to XII appearing grossly intact.  Comment: Patient with what appears to be chronic hearing difficulties however still appeared to have difficulties following a stream of thought with known chronic cognitive impairment.     Genitals:  Deferred  Psychiatric:         The patient appears to be displaying normal mood and affect at the time of evaluation.     Labs:                  Lab Results   Component Value Date     GLUCOSE 76 04/16/2025     CALCIUM 10.3 04/16/2025      04/16/2025     K 4.5 04/16/2025     CO2 23 04/16/2025      04/16/2025     BUN 22 04/16/2025     CREATININE 1.23 (H) 04/16/2025              Lab Results   Component Value Date     GLUCOSE 95 04/20/2025     CALCIUM 9.3 04/20/2025      (L) 04/20/2025     K 4.9 04/20/2025     CO2 23 04/20/2025      04/20/2025     BUN 38 (H) 04/20/2025     CREATININE 1.34 (H) 04/20/2025      Lab Results   Component Value Date    GLUCOSE 88 04/21/2025    CALCIUM 9.5 04/21/2025     04/21/2025    K 4.7 04/21/2025    CO2 24 04/21/2025     04/21/2025     BUN 36 (H) 04/21/2025    CREATININE 1.39 (H) 04/21/2025           Lab Results   Component Value Date     WBC 6.0 04/08/2025     HGB 9.6 (L) 04/08/2025     HCT 30.8 (L) 04/08/2025     MCV 98 04/08/2025      04/08/2025             Lab Results   Component Value Date     WBC 6.0 04/20/2025     HGB 9.7 (L) 04/20/2025     HCT 30.4 (L) 04/20/2025     MCV 94 04/20/2025      04/20/2025         [unfilled]   [unfilled]   No results found for the last 90 days.                  X-rays/ Images     [unfilled]   Radiology Results (last 21 days)     Procedure Component Value Units Date/Time   XR shoulder left 2+ views [[id number]] Collected: 04/04/25 2329   Order Status: Completed Updated: 04/05/25 0005   Narrative:     STUDY:  Shoulder Radiographs; 04/04/2025 11:15  INDICATION:  Left shoulder pain.  COMPARISON:  None available.  ACCESSION NUMBER(S):  [id number]  ORDERING CLINICIAN:  LIZBETH HAYES  TECHNIQUE:  Two view(s) of the left shoulder.  FINDINGS:    There is no displaced fracture.  There is osteopenia with advanced  degenerative changes of the glenohumeral joint and acromial clavicular  joint.  There is calcific tendinopathy supraspinatus.  There is  osteopenia.  Clavicle is intact.  There is soft tissue swelling.   Impression:     Advanced degenerative changes and osteopenia with calcific  tendinopathy supraspinatus tendon.  Signed by Tani Abraham DO   CT cervical spine wo IV contrast [[id number]] Collected: 04/04/25 2234   Order Status: Completed Updated: 04/04/25 2234   Narrative:     Interpreted By:  Finkelstein, Evan,  STUDY:  CT HEAD WO IV CONTRAST; CT CERVICAL SPINE WO IV CONTRAST;  4/4/2025  9:49 pm      INDICATION:  Signs/Symptoms:unwitnessed fall; Signs/Symptoms:unwithnessed fall.          COMPARISON:  CT brain 12/15/2021      ACCESSION NUMBER(S):  [id number]; [id number]      ORDERING CLINICIAN:  DOT HUSTON      TECHNIQUE:  Noncontrast CT images of the head with coronal and  sagittal  reconstructions. Axial noncontrast CT images of the cervical spine  with coronal and sagittal reconstructed images.      FINDINGS:  EXTRACRANIAL SOFT TISSUES: Unremarkable.      CALVARIUM: No depressed skull fracture. No destructive osseous lesion.      PARANASAL SINUSES/MASTOIDS: The visualized paranasal sinuses and  mastoid air cells are aerated.      HEMORRHAGE: No acute intracranial hemorrhage.      BRAIN PARENCHYMA: Gray-white matter interfaces are preserved. No mass  effect or midline shift. Encephalomalacia in the right hemisphere  similar compared to prior imaging compatible with remote infarct.  Additional nonspecific scattered white matter hypodensities are again  seen.      VENTRICLES and EXTRA-AXIAL SPACES: Parenchymal atrophy with  prominence of the ventricles and cortical sulci.      OTHER FINDINGS: There are calcifications within the cavernous carotids      CERVICAL SPINE:      ALIGNMENT: Straightening of the normal cervical lordosis, which may  be positional or related to spasm. Grade 1 anterolisthesis C4 on C5  and C5 on C6. VERTEBRAE: No acute loss of vertebral body height.  Bones are demineralized. DISC SPACE: Disc space narrowing C5/C6 and  C6/C7. SPINAL CANAL: Multilevel facet and uncovertebral arthropathy  with varying degrees of neural foraminal stenosis. Posterior disc  osteophyte complexes are most pronounced at C5/C6 and C6/C7 and  contribute to moderate central narrowing. PREVERTEBRAL SOFT TISSUES:  No prevertebral soft tissue swelling. LUNG APICES: Imaged portion of  the lung apices are within normal limits.      OTHER FINDINGS: None.       Impression:         No acute intracranial hemorrhage, mass effect or midline shift.      Encephalomalacia in the right hemisphere similar compared to prior  imaging is compatible with remote infarct. Additional nonspecific  scattered white matter hypodensities favored to represent sequela of  small vessel ischemia. Cervical spondylosis  without acute loss of  vertebral body height or traumatic malalignment.          MACRO:  None.      Signed by: Evan Finkelstein 4/4/2025 10:33 PM  Dictation workstation:   [id number]   CT head wo IV contrast [[id number]] Collected: 04/04/25 2234   Order Status: Completed Updated: 04/04/25 2234   Narrative:     Interpreted By:  Finkelstein, Evan,  STUDY:  CT HEAD WO IV CONTRAST; CT CERVICAL SPINE WO IV CONTRAST;  4/4/2025  9:49 pm      INDICATION:  Signs/Symptoms:unwitnessed fall; Signs/Symptoms:unwithnessed fall.          COMPARISON:  CT brain 12/15/2021      ACCESSION NUMBER(S):  [id number]; [id number]      ORDERING CLINICIAN:  DOT HUSTON      TECHNIQUE:  Noncontrast CT images of the head with coronal and sagittal  reconstructions. Axial noncontrast CT images of the cervical spine  with coronal and sagittal reconstructed images.      FINDINGS:  EXTRACRANIAL SOFT TISSUES: Unremarkable.      CALVARIUM: No depressed skull fracture. No destructive osseous lesion.      PARANASAL SINUSES/MASTOIDS: The visualized paranasal sinuses and  mastoid air cells are aerated.      HEMORRHAGE: No acute intracranial hemorrhage.      BRAIN PARENCHYMA: Gray-white matter interfaces are preserved. No mass  effect or midline shift. Encephalomalacia in the right hemisphere  similar compared to prior imaging compatible with remote infarct.  Additional nonspecific scattered white matter hypodensities are again  seen.      VENTRICLES and EXTRA-AXIAL SPACES: Parenchymal atrophy with  prominence of the ventricles and cortical sulci.      OTHER FINDINGS: There are calcifications within the cavernous carotids      CERVICAL SPINE:      ALIGNMENT: Straightening of the normal cervical lordosis, which may  be positional or related to spasm. Grade 1 anterolisthesis C4 on C5  and C5 on C6. VERTEBRAE: No acute loss of vertebral body height.  Bones are demineralized. DISC SPACE: Disc space narrowing C5/C6 and  C6/C7. SPINAL CANAL:  Multilevel facet and uncovertebral arthropathy  with varying degrees of neural foraminal stenosis. Posterior disc  osteophyte complexes are most pronounced at C5/C6 and C6/C7 and  contribute to moderate central narrowing. PREVERTEBRAL SOFT TISSUES:  No prevertebral soft tissue swelling. LUNG APICES: Imaged portion of  the lung apices are within normal limits.      OTHER FINDINGS: None.       Impression:         No acute intracranial hemorrhage, mass effect or midline shift.      Encephalomalacia in the right hemisphere similar compared to prior  imaging is compatible with remote infarct. Additional nonspecific  scattered white matter hypodensities favored to represent sequela of  small vessel ischemia. Cervical spondylosis without acute loss of  vertebral body height or traumatic malalignment.          MACRO:  None.      Signed by: Evan Finkelstein 4/4/2025 10:33 PM  Dictation workstation:   [id number]   XR chest 2 views [[id number]] Collected: 04/04/25 2308   Order Status: Completed Updated: 04/04/25 2315   Narrative:     STUDY:  Chest Radiographs;  4/4/2025 9:37PM  INDICATION:  Fall.  COMPARISON:  XR Chest: 2/13/2023  ACCESSION NUMBER(S):  [id number]  ORDERING CLINICIAN:  DOT HUSTON  TECHNIQUE:  Frontal and lateral chest.  FINDINGS:  CARDIOMEDIASTINAL SILHOUETTE:  Cardiomediastinal silhouette is mildly prominent and unchanged..     LUNGS:  Lungs are clear.     ABDOMEN:  No remarkable upper abdominal findings.     BONES:  No acute osseous changes.  Advanced degenerative changes are  demonstrated of both shoulder joints.   Impression:     No acute abnormality.  Signed by Rush Martin DO   XR tibia fibula left 2 views [[id number]] Collected: 04/04/25 2158   Order Status: Completed Updated: 04/04/25 2204   Narrative:     STUDY:  Tibia and Fibula Radiographs; 04/004/2025 9:36 PM  INDICATION:  Fall.  COMPARISON:  None.  ACCESSION NUMBER(S):  [id number]  ORDERING CLINICIAN:  DOT BAH  ROBEL  TECHNIQUE:  Two view(s) of the left tibia and fibula.  FINDINGS:    There is no displaced fracture.  The alignment is anatomic.  No soft  tissue abnormality is seen. There are degenerative changes of the knee  and ankle joint   Impression:     No acute osseous abnormality.  Degenerative changes.  Signed by Davin Doyle MD   XR pelvis 1-2 views [[id number]] Collected: 04/04/25 2159   Order Status: Completed Updated: 04/04/25 2205   Narrative:     STUDY:  Pelvis Radiographs; 04/04/2025 9:36 PM  INDICATION:  Fall.  COMPARISON:  XR right hip pelvis 12/15/2021.  ACCESSION NUMBER(S):  [id number]  ORDERING CLINICIAN:  DOT HUSTON  TECHNIQUE:  Single view of the pelvis.  FINDINGS:    The pelvic ring is intact.  There is no acute fracture. Bilateral  degenerative changes of the hips.   Impression:     No acute osseous abnormality.  Bilateral degenerative changes of the hips..  Signed by Davin Doyle MD                  Medical Problems         Problem List         * (Principal) Generalized weakness     Arteriosclerotic coronary artery disease     Benign essential hypertension     Hyperlipidemia     Volvulus of colon (Multi)     Vascular insufficiency of intestine (Multi)     Sensorineural hearing loss (SNHL) of both ears                  Above medical problems may be reflective of historical medical problems that may have resolved and may not related to acute clinical condition/medical problems.     Clinical impression/plan:  Expand All Collapse All    SUBJECTIVE      Pt requiring sitter due to impulsivity  Has been having visual hallucinations     4/7:              Today the patient is found awake alert and interactive appropriately sitting in the bedside chair.  Complains only of feeling tired and weak.  No acute events overnight.  Her Coreg was held yesterday due to bradycardia.  Continues today with heart rate ranging from 54-79.  Asymptomatic with this.  Blood pressure stable.  Otherwise  denies interval new symptoms or complaints including chest pain palpitations pleuritic type pain unusual shortness of breath cough sputum nausea abdominal pain flank pain fever or chills.     4/8:              Today the patient remains awake alert and interactive appropriately in bed.  No acute events overnight.  Voices no specific complaints.  Heart rate has remained mildly bradycardic and asymptomatic.  Coreg continues to be held.  Not requiring a sitter.  At this time awaiting authorization for SNF. Otherwise denies interval new symptoms or complaints including chest pain palpitations pleuritic type pain unusual shortness of breath cough sputum nausea abdominal pain flank pain fever or chills     4/9:               Today the patient once again is awake alert and interactive appropriately in bed.  Specific complaints and no acute events overnight.  Heart rate appears improved in the normal range today.  Blood pressures trending up and may need to resume Coreg.  Continuing to await authorization for SNF. Otherwise denies interval new symptoms or complaints including chest pain palpitations pleuritic type pain unusual shortness of breath cough sputum nausea abdominal pain flank pain fever or chills     4/10:               Today the patient is found sleeping but does awaken to name and exam.  Voices no specific complaints and no acute events overnight.  Continues with good heart rate and blood pressure levels.  Notified of insurance denial for SNF and awaiting appeal which likely will be on the weekend.  Otherwise denies interval new symptoms or complaints including chest pain palpitations pleuritic type pain unusual shortness of breath cough sputum nausea abdominal pain flank pain fever or chills     4/11:               Today patient is seen in the bedside chair more awake alert and interactive appropriately.  Voices no specific complaints and no acute complaints.  Today continues with mild asymptomatic  "bradycardia.  Continues to do well on room air.  Blood pressure normal and stable.  Continuing at this point to await appeal for SNF. Otherwise denies interval new symptoms or complaints including chest pain palpitations pleuritic type pain unusual shortness of breath cough sputum nausea abdominal pain flank pain fever or chills     4/12:               Today patient seen in bed and remains awake alert and interactive appropriately.  Voices no specific complaints and no acute events overnight.  Continues on room air.  Vital signs otherwise normal and stable. Continuing at this point to await appeal for SNF. Otherwise denies interval new symptoms or complaints including chest pain palpitations pleuritic type pain unusual shortness of breath cough sputum nausea abdominal pain flank pain fever or chills     4/13:                Today patient seen once again in bed.  Sleeping but awakens easily to name and exam.  Interactive at her baseline otherwise.  Voices no specific complaints and no acute events overnight.  Continuing to await appeal for SNF.  Otherwise denies interval new symptoms or complaints including chest pain palpitations pleuritic type pain unusual shortness of breath cough sputum nausea abdominal pain flank pain fever or chills        OBJECTIVE:         Physical Exam  /62 (BP Location: Right arm, Patient Position: Lying)   Pulse 59   Temp 35.9 °C (96.7 °F) (Temporal)   Resp 18   Ht 1.651 m (5' 5\")   Wt 74.8 kg (164 lb 14.5 oz)   SpO2 97%   BMI 27.44 kg/m²   Body mass index is 27.44 kg/m².     GEN - NAD, slender elderly  female awake alert and interactive appropriately  PULM - CTA  CVS - RRR, normal rate, 2/6 murmur  ABD - soft and nontender  EXTR - trace bilat LE edema  Psych: Pleasant and cooperative to exam     Scheduled medications     Scheduled Medications   aspirin, 81 mg, oral, Daily  atorvastatin, 20 mg, oral, Nightly  [Held by provider] carvedilol, 3.125 mg, oral, " "BID  enoxaparin, 30 mg, subcutaneous, q24h  lisinopril, 10 mg, oral, Daily  pantoprazole, 40 mg, oral, Daily before breakfast   Or  pantoprazole, 40 mg, intravenous, Daily before breakfast         Continuous medications  Continuous Medications          PRN medications  PRN Medications   PRN medications: acetaminophen, guaiFENesin, LORazepam, melatonin, ondansetron, polyethylene glycol         Labs          Results from last 7 days   Lab Units 04/08/25  0425   WBC AUTO x10*3/uL 6.0   HEMOGLOBIN g/dL 9.6*   HEMATOCRIT % 30.8*   PLATELETS AUTO x10*3/uL 260                Results from last 7 days   Lab Units 04/09/25  0449 04/08/25  0425   SODIUM mmol/L 137 138   POTASSIUM mmol/L 4.3 4.0   CHLORIDE mmol/L 106 107   CO2 mmol/L 25 25   BUN mg/dL 24* 22   CREATININE mg/dL 1.19* 1.29*   CALCIUM mg/dL 9.7 8.9   GLUCOSE mg/dL 80 88            Microbiology:   No results found for the last 90 days.                     No lab exists for component: \"AGALPCRNB\"   .ID              Lab Results   Component Value Date     URINECULTURE   04/05/2025       Growth indicates contamination with periurethral roly. Repeat culture if clinically indicated.            ASSESSMENT & PLAN:      1)  Fall/Debility  -  no fractures or acute injuries found during workup  - PT eval pending and will likely need SNF.  [facility] - even if not qualifying for SNF it sounds like she will need placement due to presumed dementia and unsafe living independently  - sounds like family has 24/7 caregivers and has cameras in the home for safety  - no other acute metabolic or infectious etiologies found  4/7: AM-PAC 15.  Working towards SNF.  4/13: Initially denied by insurance.  Transitional care pursuing appeal.     2)  Possible UTI  -  empiric Keflex started but culture suggests contamination >> will stop abx     3) CAD/HTN/bradycardia  -  resumed home meds  - has been bradycardic and will hold coreg  4/7: Continues today with mild asymptomatic bradycardia.  "   4/9: Heart rates primarily good range and normal today.  Blood pressure perhaps starting to trend up and may need to resume Coreg.  Continue to monitor.          4) Mild asymptomatic hyponatremia  - Monitor free water       Disposition/additional care plan/interventions:4/18/2025     Patient appeared clinically well.     Blood pressure Controlled.........................Continue hydralazine and lisinopril.     Heart rate at 55 but patient asymptomatic.     Monitor off Coreg     Continue current antihypertensive therapy      Disposition/additional care plan/interventions:4/19/2025        Chronic cognitive impairmentWith observed behavioral disturbance but patient directable.     Check UA for completeness     A.m. labs: Basic metabolic panel and CBC     Avoid hypotension     Care plan discussed in detail with patient's nurse.     Continue supportive care  Discharge barrier remains: Awaiting appeal for ECF.          Disposition/additional care plan/interventions:4/20/2025     Sodium to 132 today will monitor closely.     Avoid excessive free water.     Patient appeared euvolemic     Chronic renal injury upward trending of creatinine today in comparison to 4/16/2025 however creatinine as high as 1.4 4/15/2025     Initial suspicion earlier in hospital course of possible UTI empirical Keflex apparently was started but culture suggested contamination and antibiotic was stopped..     Blood pressure controlled     Asymptomatic bradycardia     Patient appeared pleasant today.     Await urine culture we will hold off on empirical antibiotic therapy        Disposition/additional care plan/interventions:4/21/2025      Hyponatremia resolved fluid restriction.    However recommend avoid dehydration.    Creatinine:1.23 1.34,1.39  will de-escalate lisinopril    Recommend monitoring renal function at ECF    Increase hydralazine to 10 mg twice daily    Bradycardia asymptomatic    Urinalysis: No overt signs of urinary tract infection  appreciated..    Patient should seek medical attention immediately if condition should worsen, new symptoms develop , no further improvement or recurrence of presenting symptomatology.    Discharge planning: Discharge to skilled nursing facility today.

## 2025-04-21 NOTE — DISCHARGE SUMMARY
Discharge Summary    Sylvia Moody    [id number]     4/4/2025  8:06 PM , admit date    4/21/2025,discharge date      .      Discharge Diagnosis:          1.  Fall/debility    2.  Bacteriuria without UTI    3.  Mild asymptomatic hyponatremia resolved    4.  Bradycardia    5.  Hypertension    6.  Chronic cognitive impairment    7.  Coronary artery disease    8.  STEPHANIE            Problem List Items Addressed This Visit    None  Visit Diagnoses         Codes      Fall, initial encounter    -  Primary W19.XXXA      Urinary tract infection without hematuria, site unspecified     N39.0               Hospital Course/Progress note on the date of  discharge.  Sylvia Johnson [id number]   Service: Internal Medicine / Hospitalist Date of service: 4/21/2025                                  DNR and No Intubation                        Subjective       History Of Present Illness (HPI):  Sylvia Moody is a 95 y.o. female with PMHx s/f CAD s/p prior CABG, HTN, DLD presenting with unwitnessed fall. She is a poor historian and doesn't remember what happened, most history obtained by chart review.  Per ambulance record, EMS was requested for patient with a fall and had a leg injury.  On EMS arrival family stated that she had fallen and was able to get her up into a chair.  Her family member thought she may have a broken hip as her leg appeared outwardly rotated.  She is acting normal for her per family and is usually ANO x 2 at baseline.  She is unable to recount how she fell or how long she was down or if she hit her head.  Denies headache, neck pain, chest pain, SOB, dysuria, focal or lateralizing weakness.     ED Course:   Vitals on presentation: T 36.4 °C (97.5 °F)  HR 52  BP (!) 188/84  RR 18  O2 99 % None (Room air)  Labs:   CMP, CBC largely unremarkable  Lactate 0.6  Troponin 66 BNP 42  UA-leukocyte esterase 500, WBC 21-50  EKG: Sinus rhythm at 77 bpm, RBBB and LAFB.  , QTc 502.  Imaging - agree with radiology  interpretation(s):   XR tib-fib left, pelvis-no acute osseous abnormality denies generative changes  CT head-, CT cervical spine-no acute intracranial abnormality.  Encephalomalacia in the right hemisphere similar compared to prior imaging with remote infarct.  Nonspecific scattered white matter hypodensities favoring sequela small vessel ischemia.  Cervical spondylosis.  XR chest-no acute abnormality  XR shoulder left-degenerative changes and osteopenia  Interventions: Tylenol 650 mg, aspirin 81 mg,  ml, started on Keflex 500 mg every 12 hours, admission for further management.     4/7:              Today the patient is found awake alert and interactive appropriately sitting in the bedside chair.  Complains only of feeling tired and weak.  No acute events overnight.  Her Coreg was held yesterday due to bradycardia.  Continues today with heart rate ranging from 54-79.  Asymptomatic with this.  Blood pressure stable.  Otherwise denies interval new symptoms or complaints including chest pain palpitations pleuritic type pain unusual shortness of breath cough sputum nausea abdominal pain flank pain fever or chills.     4/8:              Today the patient remains awake alert and interactive appropriately in bed.  No acute events overnight.  Voices no specific complaints.  Heart rate has remained mildly bradycardic and asymptomatic.  Coreg continues to be held.  Not requiring a sitter.  At this time awaiting authorization for SNF. Otherwise denies interval new symptoms or complaints including chest pain palpitations pleuritic type pain unusual shortness of breath cough sputum nausea abdominal pain flank pain fever or chills     4/9:               Today the patient once again is awake alert and interactive appropriately in bed.  Specific complaints and no acute events overnight.  Heart rate appears improved in the normal range today.  Blood pressures trending up and may need to resume Coreg.  Continuing to await  authorization for SNF. Otherwise denies interval new symptoms or complaints including chest pain palpitations pleuritic type pain unusual shortness of breath cough sputum nausea abdominal pain flank pain fever or chills     4/10:               Today the patient is found sleeping but does awaken to name and exam.  Voices no specific complaints and no acute events overnight.  Continues with good heart rate and blood pressure levels.  Notified of insurance denial for SNF and awaiting appeal which likely will be on the weekend.  Otherwise denies interval new symptoms or complaints including chest pain palpitations pleuritic type pain unusual shortness of breath cough sputum nausea abdominal pain flank pain fever or chills     4/11:               Today patient is seen in the bedside chair more awake alert and interactive appropriately.  Voices no specific complaints and no acute complaints.  Today continues with mild asymptomatic bradycardia.  Continues to do well on room air.  Blood pressure normal and stable.  Continuing at this point to await appeal for SNF. Otherwise denies interval new symptoms or complaints including chest pain palpitations pleuritic type pain unusual shortness of breath cough sputum nausea abdominal pain flank pain fever or chills     4/12:               Today patient seen in bed and remains awake alert and interactive appropriately.  Voices no specific complaints and no acute events overnight.  Continues on room air.  Vital signs otherwise normal and stable. Continuing at this point to await appeal for SNF. Otherwise denies interval new symptoms or complaints including chest pain palpitations pleuritic type pain unusual shortness of breath cough sputum nausea abdominal pain flank pain fever or chills     4/13:                Today patient seen once again in bed.  Sleeping but awakens easily to name and exam.  Interactive at her baseline otherwise.  Voices no specific complaints and no acute  events overnight.  Continuing to await appeal for SNF.  Otherwise denies interval new symptoms or complaints including chest pain palpitations pleuritic type pain unusual shortness of breath cough sputum nausea abdominal pain flank pain fever or chills     4/14................  Patient up in chair, disclose no acute complaints mentioned that overall she felt better today.No reported :fevers, chills, headaches, chest pains, nausea or vomiting.     4/15...................Patient seen examined in the  presence of her nurse..No reported headaches, chest pains, dyspnea, nausea, vomiting, fevers or chills.     4/16..........Patient report doing well today voiced appreciation of her care.  No reported: Headaches, chest pains, nausea, vomiting, fevers, chills or dysuria.     4/17.................The patient had no complaints at the time of evaluation.No reported: Dyspnea, chest pains, nausea, vomiting, fevers or chills.     4/18...............Late note entry patient seen and evaluated earlier this morning.  No provisional report of overnight events by nursing.  No acute complaints voiced at the time of evaluation.No reported: Headaches, chest pains, dyspnea, palpitations, nausea, vomiting, fevers or chills.     4/19...............................Patient seen and examined in the presence of her nurse.  Patient seems a bit agitated today.  No reported: Hallucinations, tremors, seizure activities, nausea, vomiting or respiratory distress.     4/20......................No reported: Hallucinations, fevers, chills, syncope, nausea, vomiting, chest pains or dyspnea.     4/21.........................No overnight events voiced by patient or patient's nurse.  No reported headaches, chest pains, nausea, vomiting, fevers or chills        Review of Systems:   Review of system otherwise negative if not aforementioned above in subjective.     Objective        Physical Exam      Constitutional:       Appearance: Patient appeared in no  acute cardiopulmonary distress.     Comments: Patient alert and oriented to person place  and situation  to some degree it appeared.Known chronic cognitive impairment. Patient appeared pleasant today, appeared nontoxic.  HEENT:      Head: Normocephalic and atraumatic.Trachea midline      Nose:No observed congestion or rhinorrhea.     Mouth/Throat: Mucous membranes Moist, Trachea appeared  midline.  Eyes:      Extraocular Movements: Extraocular movements intact.      Pupils: Pupils are equal, round, and reactive to light.      Comments: No scleral icterus or conjunctival injection appreciated.   Cardiovascular:      Rate and Rhythm: Normal rate and regular rhythm. No clicks rubs or gallops, normal S1 and S2.No peripheral stigmata of endocarditis appreciated.  Comment: Murmur 2 out of 6     Pulmonary:      Lungs appeared clear to auscultation, no adventitious sound appreciated.  Abdominal:      General: Abdomen soft, nontender, active bowel sounds, no involuntary guarding or rebound tenderness appreciated.     Comments: None   Musculoskeletal:       No pitting edema or cyanosis appreciated.No acute joint deformity appreciated to per hypertrophy of the range of motion for upper and lower extremities.       Skin:     General: Skin is warm.      Coloration:  No jaundice     Findings: No abnormal appearing skin rashes or lesions that appeared acute noted on unclothed area of the skin..   Neurological:      General: No  new appearing focal sensory or motor deficits appreciated, no meningeal signs or dysmetria noted.   Cranial Nerves: Cranial nerves II to XII appearing grossly intact.  Comment: Patient with what appears to be chronic hearing difficulties however still appeared to have difficulties following a stream of thought with known chronic cognitive impairment.     Genitals:  Deferred  Psychiatric:         The patient appears to be displaying normal mood and affect at the time of evaluation.     Labs:                   Lab Results   Component Value Date     GLUCOSE 76 04/16/2025     CALCIUM 10.3 04/16/2025      04/16/2025     K 4.5 04/16/2025     CO2 23 04/16/2025      04/16/2025     BUN 22 04/16/2025     CREATININE 1.23 (H) 04/16/2025                  Lab Results   Component Value Date     GLUCOSE 95 04/20/2025     CALCIUM 9.3 04/20/2025      (L) 04/20/2025     K 4.9 04/20/2025     CO2 23 04/20/2025      04/20/2025     BUN 38 (H) 04/20/2025     CREATININE 1.34 (H) 04/20/2025            Lab Results   Component Value Date     GLUCOSE 88 04/21/2025     CALCIUM 9.5 04/21/2025      04/21/2025     K 4.7 04/21/2025     CO2 24 04/21/2025      04/21/2025     BUN 36 (H) 04/21/2025     CREATININE 1.39 (H) 04/21/2025           Lab Results   Component Value Date     WBC 6.0 04/08/2025     HGB 9.6 (L) 04/08/2025     HCT 30.8 (L) 04/08/2025     MCV 98 04/08/2025      04/08/2025                 Lab Results   Component Value Date     WBC 6.0 04/20/2025     HGB 9.7 (L) 04/20/2025     HCT 30.4 (L) 04/20/2025     MCV 94 04/20/2025      04/20/2025         [unfilled]   [unfilled]   No results found for the last 90 days.                  X-rays/ Images     [unfilled]   Radiology Results (last 21 days)     Procedure Component Value Units Date/Time   XR shoulder left 2+ views [[id number]] Collected: 04/04/25 2329   Order Status: Completed Updated: 04/05/25 0005   Narrative:     STUDY:  Shoulder Radiographs; 04/04/2025 11:15  INDICATION:  Left shoulder pain.  COMPARISON:  None available.  ACCESSION NUMBER(S):  [id number]  ORDERING CLINICIAN:  LIZBETH HAYES  TECHNIQUE:  Two view(s) of the left shoulder.  FINDINGS:    There is no displaced fracture.  There is osteopenia with advanced  degenerative changes of the glenohumeral joint and acromial clavicular  joint.  There is calcific tendinopathy supraspinatus.  There is  osteopenia.  Clavicle is intact.  There is soft tissue swelling.   Impression:      Advanced degenerative changes and osteopenia with calcific  tendinopathy supraspinatus tendon.  Signed by Tani Abraham DO   CT cervical spine wo IV contrast [[id number]] Collected: 04/04/25 2234   Order Status: Completed Updated: 04/04/25 2234   Narrative:     Interpreted By:  Finkelstein, Evan,  STUDY:  CT HEAD WO IV CONTRAST; CT CERVICAL SPINE WO IV CONTRAST;  4/4/2025  9:49 pm      INDICATION:  Signs/Symptoms:unwitnessed fall; Signs/Symptoms:unwithnessed fall.          COMPARISON:  CT brain 12/15/2021      ACCESSION NUMBER(S):  [id number]; [id number]      ORDERING CLINICIAN:  DOT HUSTON      TECHNIQUE:  Noncontrast CT images of the head with coronal and sagittal  reconstructions. Axial noncontrast CT images of the cervical spine  with coronal and sagittal reconstructed images.      FINDINGS:  EXTRACRANIAL SOFT TISSUES: Unremarkable.      CALVARIUM: No depressed skull fracture. No destructive osseous lesion.      PARANASAL SINUSES/MASTOIDS: The visualized paranasal sinuses and  mastoid air cells are aerated.      HEMORRHAGE: No acute intracranial hemorrhage.      BRAIN PARENCHYMA: Gray-white matter interfaces are preserved. No mass  effect or midline shift. Encephalomalacia in the right hemisphere  similar compared to prior imaging compatible with remote infarct.  Additional nonspecific scattered white matter hypodensities are again  seen.      VENTRICLES and EXTRA-AXIAL SPACES: Parenchymal atrophy with  prominence of the ventricles and cortical sulci.      OTHER FINDINGS: There are calcifications within the cavernous carotids      CERVICAL SPINE:      ALIGNMENT: Straightening of the normal cervical lordosis, which may  be positional or related to spasm. Grade 1 anterolisthesis C4 on C5  and C5 on C6. VERTEBRAE: No acute loss of vertebral body height.  Bones are demineralized. DISC SPACE: Disc space narrowing C5/C6 and  C6/C7. SPINAL CANAL: Multilevel facet and uncovertebral arthropathy  with  varying degrees of neural foraminal stenosis. Posterior disc  osteophyte complexes are most pronounced at C5/C6 and C6/C7 and  contribute to moderate central narrowing. PREVERTEBRAL SOFT TISSUES:  No prevertebral soft tissue swelling. LUNG APICES: Imaged portion of  the lung apices are within normal limits.      OTHER FINDINGS: None.       Impression:         No acute intracranial hemorrhage, mass effect or midline shift.      Encephalomalacia in the right hemisphere similar compared to prior  imaging is compatible with remote infarct. Additional nonspecific  scattered white matter hypodensities favored to represent sequela of  small vessel ischemia. Cervical spondylosis without acute loss of  vertebral body height or traumatic malalignment.          MACRO:  None.      Signed by: Evan Finkelstein 4/4/2025 10:33 PM  Dictation workstation:   [id number]   CT head wo IV contrast [[id number]] Collected: 04/04/25 2234   Order Status: Completed Updated: 04/04/25 2234   Narrative:     Interpreted By:  Finkelstein, Evan,  STUDY:  CT HEAD WO IV CONTRAST; CT CERVICAL SPINE WO IV CONTRAST;  4/4/2025  9:49 pm      INDICATION:  Signs/Symptoms:unwitnessed fall; Signs/Symptoms:unwithnessed fall.          COMPARISON:  CT brain 12/15/2021      ACCESSION NUMBER(S):  [id number]; [id number]      ORDERING CLINICIAN:  DOT HUSTON      TECHNIQUE:  Noncontrast CT images of the head with coronal and sagittal  reconstructions. Axial noncontrast CT images of the cervical spine  with coronal and sagittal reconstructed images.      FINDINGS:  EXTRACRANIAL SOFT TISSUES: Unremarkable.      CALVARIUM: No depressed skull fracture. No destructive osseous lesion.      PARANASAL SINUSES/MASTOIDS: The visualized paranasal sinuses and  mastoid air cells are aerated.      HEMORRHAGE: No acute intracranial hemorrhage.      BRAIN PARENCHYMA: Gray-white matter interfaces are preserved. No mass  effect or midline shift. Encephalomalacia in the  right hemisphere  similar compared to prior imaging compatible with remote infarct.  Additional nonspecific scattered white matter hypodensities are again  seen.      VENTRICLES and EXTRA-AXIAL SPACES: Parenchymal atrophy with  prominence of the ventricles and cortical sulci.      OTHER FINDINGS: There are calcifications within the cavernous carotids      CERVICAL SPINE:      ALIGNMENT: Straightening of the normal cervical lordosis, which may  be positional or related to spasm. Grade 1 anterolisthesis C4 on C5  and C5 on C6. VERTEBRAE: No acute loss of vertebral body height.  Bones are demineralized. DISC SPACE: Disc space narrowing C5/C6 and  C6/C7. SPINAL CANAL: Multilevel facet and uncovertebral arthropathy  with varying degrees of neural foraminal stenosis. Posterior disc  osteophyte complexes are most pronounced at C5/C6 and C6/C7 and  contribute to moderate central narrowing. PREVERTEBRAL SOFT TISSUES:  No prevertebral soft tissue swelling. LUNG APICES: Imaged portion of  the lung apices are within normal limits.      OTHER FINDINGS: None.       Impression:         No acute intracranial hemorrhage, mass effect or midline shift.      Encephalomalacia in the right hemisphere similar compared to prior  imaging is compatible with remote infarct. Additional nonspecific  scattered white matter hypodensities favored to represent sequela of  small vessel ischemia. Cervical spondylosis without acute loss of  vertebral body height or traumatic malalignment.          MACRO:  None.      Signed by: Evan Finkelstein 4/4/2025 10:33 PM  Dictation workstation:   [id number]   XR chest 2 views [[id number]] Collected: 04/04/25 2308   Order Status: Completed Updated: 04/04/25 2315   Narrative:     STUDY:  Chest Radiographs;  4/4/2025 9:37PM  INDICATION:  Fall.  COMPARISON:  XR Chest: 2/13/2023  ACCESSION NUMBER(S):  [id number]  ORDERING CLINICIAN:  DOT HUSTON  TECHNIQUE:  Frontal and lateral  chest.  FINDINGS:  CARDIOMEDIASTINAL SILHOUETTE:  Cardiomediastinal silhouette is mildly prominent and unchanged..     LUNGS:  Lungs are clear.     ABDOMEN:  No remarkable upper abdominal findings.     BONES:  No acute osseous changes.  Advanced degenerative changes are  demonstrated of both shoulder joints.   Impression:     No acute abnormality.  Signed by Rush Martin DO   XR tibia fibula left 2 views [[id number]] Collected: 04/04/25 2158   Order Status: Completed Updated: 04/04/25 2204   Narrative:     STUDY:  Tibia and Fibula Radiographs; 04/004/2025 9:36 PM  INDICATION:  Fall.  COMPARISON:  None.  ACCESSION NUMBER(S):  [id number]  ORDERING CLINICIAN:  DOT HUSTON  TECHNIQUE:  Two view(s) of the left tibia and fibula.  FINDINGS:    There is no displaced fracture.  The alignment is anatomic.  No soft  tissue abnormality is seen. There are degenerative changes of the knee  and ankle joint   Impression:     No acute osseous abnormality.  Degenerative changes.  Signed by Davin Doyle MD   XR pelvis 1-2 views [[id number]] Collected: 04/04/25 2159   Order Status: Completed Updated: 04/04/25 2205   Narrative:     STUDY:  Pelvis Radiographs; 04/04/2025 9:36 PM  INDICATION:  Fall.  COMPARISON:  XR right hip pelvis 12/15/2021.  ACCESSION NUMBER(S):  [id number]  ORDERING CLINICIAN:  DOT HUSTON  TECHNIQUE:  Single view of the pelvis.  FINDINGS:    The pelvic ring is intact.  There is no acute fracture. Bilateral  degenerative changes of the hips.   Impression:     No acute osseous abnormality.  Bilateral degenerative changes of the hips..  Signed by Davin Doyle MD                  Medical Problems         Problem List         * (Principal) Generalized weakness     Arteriosclerotic coronary artery disease     Benign essential hypertension     Hyperlipidemia     Volvulus of colon (Multi)     Vascular insufficiency of intestine (Multi)     Sensorineural hearing loss (SNHL) of both ears                   Above medical problems may be reflective of historical medical problems that may have resolved and may not related to acute clinical condition/medical problems.     Clinical impression/plan:  Expand All Collapse All    SUBJECTIVE      Pt requiring sitter due to impulsivity  Has been having visual hallucinations     4/7:              Today the patient is found awake alert and interactive appropriately sitting in the bedside chair.  Complains only of feeling tired and weak.  No acute events overnight.  Her Coreg was held yesterday due to bradycardia.  Continues today with heart rate ranging from 54-79.  Asymptomatic with this.  Blood pressure stable.  Otherwise denies interval new symptoms or complaints including chest pain palpitations pleuritic type pain unusual shortness of breath cough sputum nausea abdominal pain flank pain fever or chills.     4/8:              Today the patient remains awake alert and interactive appropriately in bed.  No acute events overnight.  Voices no specific complaints.  Heart rate has remained mildly bradycardic and asymptomatic.  Coreg continues to be held.  Not requiring a sitter.  At this time awaiting authorization for SNF. Otherwise denies interval new symptoms or complaints including chest pain palpitations pleuritic type pain unusual shortness of breath cough sputum nausea abdominal pain flank pain fever or chills     4/9:               Today the patient once again is awake alert and interactive appropriately in bed.  Specific complaints and no acute events overnight.  Heart rate appears improved in the normal range today.  Blood pressures trending up and may need to resume Coreg.  Continuing to await authorization for SNF. Otherwise denies interval new symptoms or complaints including chest pain palpitations pleuritic type pain unusual shortness of breath cough sputum nausea abdominal pain flank pain fever or chills     4/10:               Today the patient is found  sleeping but does awaken to name and exam.  Voices no specific complaints and no acute events overnight.  Continues with good heart rate and blood pressure levels.  Notified of insurance denial for SNF and awaiting appeal which likely will be on the weekend.  Otherwise denies interval new symptoms or complaints including chest pain palpitations pleuritic type pain unusual shortness of breath cough sputum nausea abdominal pain flank pain fever or chills     4/11:               Today patient is seen in the bedside chair more awake alert and interactive appropriately.  Voices no specific complaints and no acute complaints.  Today continues with mild asymptomatic bradycardia.  Continues to do well on room air.  Blood pressure normal and stable.  Continuing at this point to await appeal for SNF. Otherwise denies interval new symptoms or complaints including chest pain palpitations pleuritic type pain unusual shortness of breath cough sputum nausea abdominal pain flank pain fever or chills     4/12:               Today patient seen in bed and remains awake alert and interactive appropriately.  Voices no specific complaints and no acute events overnight.  Continues on room air.  Vital signs otherwise normal and stable. Continuing at this point to await appeal for SNF. Otherwise denies interval new symptoms or complaints including chest pain palpitations pleuritic type pain unusual shortness of breath cough sputum nausea abdominal pain flank pain fever or chills     4/13:                Today patient seen once again in bed.  Sleeping but awakens easily to name and exam.  Interactive at her baseline otherwise.  Voices no specific complaints and no acute events overnight.  Continuing to await appeal for SNF.  Otherwise denies interval new symptoms or complaints including chest pain palpitations pleuritic type pain unusual shortness of breath cough sputum nausea abdominal pain flank pain fever or chills        OBJECTIVE:        "  Physical Exam  /62 (BP Location: Right arm, Patient Position: Lying)   Pulse 59   Temp 35.9 °C (96.7 °F) (Temporal)   Resp 18   Ht 1.651 m (5' 5\")   Wt 74.8 kg (164 lb 14.5 oz)   SpO2 97%   BMI 27.44 kg/m²   Body mass index is 27.44 kg/m².     GEN - NAD, slender elderly  female awake alert and interactive appropriately  PULM - CTA  CVS - RRR, normal rate, 2/6 murmur  ABD - soft and nontender  EXTR - trace bilat LE edema  Psych: Pleasant and cooperative to exam     Scheduled medications     Scheduled Medications   aspirin, 81 mg, oral, Daily  atorvastatin, 20 mg, oral, Nightly  [Held by provider] carvedilol, 3.125 mg, oral, BID  enoxaparin, 30 mg, subcutaneous, q24h  lisinopril, 10 mg, oral, Daily  pantoprazole, 40 mg, oral, Daily before breakfast   Or  pantoprazole, 40 mg, intravenous, Daily before breakfast         Continuous medications  Continuous Medications          PRN medications  PRN Medications   PRN medications: acetaminophen, guaiFENesin, LORazepam, melatonin, ondansetron, polyethylene glycol         Labs          Results from last 7 days   Lab Units 04/08/25  0425   WBC AUTO x10*3/uL 6.0   HEMOGLOBIN g/dL 9.6*   HEMATOCRIT % 30.8*   PLATELETS AUTO x10*3/uL 260                Results from last 7 days   Lab Units 04/09/25  0449 04/08/25  0425   SODIUM mmol/L 137 138   POTASSIUM mmol/L 4.3 4.0   CHLORIDE mmol/L 106 107   CO2 mmol/L 25 25   BUN mg/dL 24* 22   CREATININE mg/dL 1.19* 1.29*   CALCIUM mg/dL 9.7 8.9   GLUCOSE mg/dL 80 88            Microbiology:   No results found for the last 90 days.                     No lab exists for component: \"AGALPCRNB\"   .ID              Lab Results   Component Value Date     URINECULTURE   04/05/2025       Growth indicates contamination with periurethral roly. Repeat culture if clinically indicated.            ASSESSMENT & PLAN:      1)  Fall/Debility  -  no fractures or acute injuries found during workup  - PT eval pending and will likely need " SNF.  PT [facility] is 16 - even if not qualifying for SNF it sounds like she will need placement due to presumed dementia and unsafe living independently  - sounds like family has 24/7 caregivers and has cameras in the home for safety  - no other acute metabolic or infectious etiologies found  4/7: AM-PAC 15.  Working towards SNF.  4/13: Initially denied by insurance.  Transitional care pursuing appeal.     2)  Possible UTI  -  empiric Keflex started but culture suggests contamination >> will stop abx     3) CAD/HTN/bradycardia  -  resumed home meds  - has been bradycardic and will hold coreg  4/7: Continues today with mild asymptomatic bradycardia.    4/9: Heart rates primarily good range and normal today.  Blood pressure perhaps starting to trend up and may need to resume Coreg.  Continue to monitor.          4) Mild asymptomatic hyponatremia  - Monitor free water       Disposition/additional care plan/interventions:4/18/2025     Patient appeared clinically well.     Blood pressure Controlled.........................Continue hydralazine and lisinopril.     Heart rate at 55 but patient asymptomatic.     Monitor off Coreg     Continue current antihypertensive therapy      Disposition/additional care plan/interventions:4/19/2025        Chronic cognitive impairmentWith observed behavioral disturbance but patient directable.     Check UA for completeness     A.m. labs: Basic metabolic panel and CBC     Avoid hypotension     Care plan discussed in detail with patient's nurse.     Continue supportive care  Discharge barrier remains: Awaiting appeal for ECF.          Disposition/additional care plan/interventions:4/20/2025     Sodium to 132 today will monitor closely.     Avoid excessive free water.     Patient appeared euvolemic     Chronic renal injury upward trending of creatinine today in comparison to 4/16/2025 however creatinine as high as 1.4 4/15/2025     Initial suspicion earlier in hospital course of possible UTI  empirical Keflex apparently was started but culture suggested contamination and antibiotic was stopped..     Blood pressure controlled     Asymptomatic bradycardia     Patient appeared pleasant today.     Await urine culture we will hold off on empirical antibiotic therapy        Disposition/additional care plan/interventions:4/21/2025      Hyponatremia resolved fluid restriction.     However recommend avoid dehydration.     Creatinine:1.23 1.34,1.39  will de-escalate lisinopril     Recommend monitoring renal function at ECF     Increase hydralazine to 10 mg twice daily     Bradycardia asymptomatic     Urinalysis: No overt signs of urinary tract infection appreciated..     Patient should seek medical attention immediately if condition should worsen, new symptoms develop , no further improvement or recurrence of presenting symptomatology.     Discharge planning: Discharge to skilled nursing facility today    Consultants         None      Surgeries/Procedures:    None               Your medication list        ASK your doctor about these medications        Instructions Last Dose Given Next Dose Due   aspirin 81 mg EC tablet           carvedilol 3.125 mg tablet  Commonly known as: Coreg           lisinopril 10 mg tablet           simvastatin 20 mg tablet  Commonly known as: Zocor                       Disposition/additional hospital course / events;      95-year-old  female with what appeared to be chronic cognitive impairment with notable history as well of coronary artery disease, hypertension and dyslipidemia was admitted secondary to fall.  The.  Initial presentation there was question of possible urinary tract infection with patient placed apparently empirically on antimicrobial therapy which was then stopped when evidence did not support underlying UTI.    Patient extended hospital course was secondary to appeal for skilled nursing facility.  She continues show clinical improvement with hospitalization.   Acute kidney injury was also appreciated hospitalization with occasional interval worsening and improvement.  Also patient hospital course was noted for mild hyponatremia that appeared asymptomatic with recovery to sodium levels within normal limits with a brief fluid restriction.    Overall patient does not appear volume overloaded today appeared euvolemic on examination.  Patient with ongoing chronic cognitive impairment and concomitant marked hearing difficulties.  She appeared nontoxic appeared clinically stable and fit for discharge today to skilled nursing facility.    Among the medication stewardship implemented during hospitalization included stoppage of Coreg in light of bradycardia, hydralazine was added and escalated to twice daily with recent worsening of renal function. Concomitant lisinopril use, lisinopril de-escalated to 5 mg daily instead of 10 mg.  Further blood pressure and renal  monitoring  at [facility] appeared prudent.    The patient appeared hemodynamically stable and clinically fit for discharge.  Patient should seek medical attention immediately if condition should worsen, new symptoms develop , no further improvement or recurrence of presenting symptomatology, patient warned    Follow-up:    Follow -up with family physician within one week. Outpatient audiology follow-up recommended.      Discharge Time : 32 mins      Patient should seek medical attention immediately if condition should worsen , presenting symptoms/conditions do not resolve or new symptoms should developed,patient warned.     Dictation performed with assistance of voice recognition device therefore transcription errors are possible.